# Patient Record
Sex: MALE | Race: WHITE | NOT HISPANIC OR LATINO | Employment: OTHER | URBAN - METROPOLITAN AREA
[De-identification: names, ages, dates, MRNs, and addresses within clinical notes are randomized per-mention and may not be internally consistent; named-entity substitution may affect disease eponyms.]

---

## 2017-01-05 ENCOUNTER — HOSPITAL ENCOUNTER (EMERGENCY)
Facility: MEDICAL CENTER | Age: 77
End: 2017-01-05
Attending: EMERGENCY MEDICINE
Payer: COMMERCIAL

## 2017-01-05 ENCOUNTER — APPOINTMENT (OUTPATIENT)
Dept: RADIOLOGY | Facility: MEDICAL CENTER | Age: 77
End: 2017-01-05
Attending: EMERGENCY MEDICINE
Payer: COMMERCIAL

## 2017-01-05 VITALS
SYSTOLIC BLOOD PRESSURE: 153 MMHG | DIASTOLIC BLOOD PRESSURE: 76 MMHG | OXYGEN SATURATION: 92 % | BODY MASS INDEX: 27.77 KG/M2 | HEIGHT: 72 IN | WEIGHT: 205 LBS | RESPIRATION RATE: 16 BRPM | HEART RATE: 73 BPM | TEMPERATURE: 96.8 F

## 2017-01-05 DIAGNOSIS — F10.10 ALCOHOL ABUSE: ICD-10-CM

## 2017-01-05 DIAGNOSIS — S09.90XA CHI (CLOSED HEAD INJURY), INITIAL ENCOUNTER: ICD-10-CM

## 2017-01-05 PROCEDURE — 72125 CT NECK SPINE W/O DYE: CPT

## 2017-01-05 PROCEDURE — 99284 EMERGENCY DEPT VISIT MOD MDM: CPT

## 2017-01-05 PROCEDURE — 96374 THER/PROPH/DIAG INJ IV PUSH: CPT

## 2017-01-05 PROCEDURE — 70450 CT HEAD/BRAIN W/O DYE: CPT

## 2017-01-05 PROCEDURE — 700111 HCHG RX REV CODE 636 W/ 250 OVERRIDE (IP): Performed by: EMERGENCY MEDICINE

## 2017-01-05 RX ORDER — ONDANSETRON 4 MG/1
4 TABLET, ORALLY DISINTEGRATING ORAL EVERY 8 HOURS PRN
Qty: 10 TAB | Refills: 1 | Status: SHIPPED | OUTPATIENT
Start: 2017-01-05 | End: 2023-01-01

## 2017-01-05 RX ORDER — ONDANSETRON 2 MG/ML
4 INJECTION INTRAMUSCULAR; INTRAVENOUS ONCE
Status: COMPLETED | OUTPATIENT
Start: 2017-01-05 | End: 2017-01-05

## 2017-01-05 RX ADMIN — ONDANSETRON 4 MG: 2 INJECTION, SOLUTION INTRAMUSCULAR; INTRAVENOUS at 18:38

## 2017-01-05 ASSESSMENT — PAIN SCALES - GENERAL: PAINLEVEL_OUTOF10: 0

## 2017-01-05 NOTE — ED AVS SNAPSHOT
HuoBi Access Code: [email or web address]  Expires: 2/4/2017 10:00 PM    Your email address is not on file at [facility].  Email Addresses are required for you to sign up for HuoBi, please contact [phone] to verify your personal information and to provide your email address prior to attempting to register for HuoBi.    Taran [address] [address]  [place] [address]    HuoBi  A secure, online tool to manage your health information     [facility]’s HuoBi® is a secure, online tool that connects you to your personalized health information from the privacy of your home -- day or night - making it very easy for you to manage your healthcare. Once the activation process is completed, you can even access your medical information using the HuoBi jonnathan, which is available for free in the Apple Jonnathan store or Google Play store.     To learn more about HuoBi, visit [email or web address]    There are two levels of access available (as shown below):   My Chart Features  [facility] Primary Care Doctor [facility]  Specialists [facility]  Urgent  Care Non-[facility] Primary Care Doctor   Email your healthcare team securely and privately 24/7 X X X    Manage appointments: schedule your next appointment; view details of past/upcoming appointments X      Request prescription refills. X      View recent personal medical records, including lab and immunizations X X X X   View health record, including health history, allergies, medications X X X X   Read reports about your outpatient visits, procedures, consult and ER notes X X X X   See your discharge summary, which is a recap of your hospital and/or ER visit that includes your diagnosis, lab results, and care plan X X  X     How to register for GoTunest:  Once your e-mail address has been verified, follow the following steps to sign up for HuoBi.     1. Go to  [email or web address]  2. Click on the Sign Up Now box, which takes you to the New Member Sign Up page. You will need  to provide the following information:  a. Enter your Ad.IQ Access Code exactly as it appears at the top of this page. (You will not need to use this code after you’ve completed the sign-up process. If you do not sign up before the expiration date, you must request a new code.)   b. Enter your date of birth.   c. Enter your home email address.   d. Click Submit, and follow the next screen’s instructions.  3. Create a Ad.IQ ID. This will be your Ad.IQ login ID and cannot be changed, so think of one that is secure and easy to remember.  4. Create a Ad.IQ password. You can change your password at any time.  5. Enter your Password Reset Question and Answer. This can be used at a later time if you forget your password.   6. Enter your e-mail address. This allows you to receive e-mail notifications when new information is available in Ad.IQ.  7. Click Sign Up. You can now view your health information.    For assistance activating your Ad.IQ account, call [phone]

## 2017-01-05 NOTE — ED AVS SNAPSHOT
1/5/2017          Taran [address] [address]  [facility] [address]    Dear Taran:    [facility] wants to ensure your discharge home is safe and you or your loved ones have had all your questions answered regarding your care after you leave the hospital.    You may receive a telephone call within two days of your discharge.  This call is to make certain you understand your discharge instructions as well as ensure we provided you with the best care possible during your stay with us.     The call will only last approximately 3-5 minutes and will be done by a nurse.    Once again, we want to ensure your discharge home is safe and that you have a clear understanding of any next steps in your care.  If you have any questions or concerns, please do not hesitate to contact us, we are here for you.  Thank you for choosing [facility] for your healthcare needs.    Sincerely,    Josafat Mao    [facility]

## 2017-01-05 NOTE — ED AVS SNAPSHOT
After Visit Summary                                                                                                                Taran Ornelas   MRN: [id number]    Department:  [facility], Emergency Dept   Date of Visit:  1/5/2017            [facility], Emergency Dept    [address] [facility] [address]    Phone:  [phone]      You were seen by     Price Saavedra D.O.      Your Diagnosis Was     CHI (closed head injury), initial encounter     S09.90XA       These are the medications you received during your hospitalization from 01/05/2017 1758 to 01/05/2017 2200     Date/Time Order Dose Route Action    01/05/2017 1838 ondansetron (ZOFRAN) syringe/vial injection 4 mg 4 mg Intravenous Given      Follow-up Information     1. Schedule an appointment as soon as possible for a visit with Alfie Taylor M.D..    Specialty:  Family Medicine    Contact information    [address]  [address]  [facility] [address]  [phone]        Medication Information     Review all of your home medications and newly ordered medications with your primary doctor and/or pharmacist as soon as possible. Follow medication instructions as directed by your doctor and/or pharmacist.     Please keep your complete medication list with you and share with your physician. Update the information when medications are discontinued, doses are changed, or new medications (including over-the-counter products) are added; and carry medication information at all times in the event of emergency situations.               Medication List      START taking these medications        Instructions    ondansetron 4 MG Tbdp   Commonly known as:  ZOFRAN ODT    Take 1 Tab by mouth every 8 hours as needed.   Dose:  4 mg         ASK your doctor about these medications        Instructions    allopurinol 300 MG Tabs   Commonly known as:  ZYLOPRIM    Take 300 mg by mouth every day.   Dose:  300 mg       aspirin 81 MG tablet    Take  81 mg by mouth every day.   Dose:  81 mg       benazepril 10 MG Tabs   Commonly known as:  LOTENSIN    Take 10 mg by mouth 2 Times a Day.   Dose:  10 mg       gabapentin 300 MG Caps   Commonly known as:  NEURONTIN    Take 300 mg by mouth 3 times a day.   Dose:  300 mg       metoprolol 25 MG Tabs   Commonly known as:  LOPRESSOR    Take 1 Tab by mouth 2 times a day.   Dose:  25 mg       VITAMIN B 12 PO    Take  by mouth.       vitamin D 1000 UNIT Tabs   Commonly known as:  cholecalciferol    Take 1,000 Units by mouth every day.   Dose:  1000 Units               Procedures and tests performed during your visit     CT-CSPINE WITHOUT PLUS RECONS    CT-HEAD W/O        Discharge Instructions       Head Injury, Adult  You have a head injury. Headaches and throwing up (vomiting) are common after a head injury. It should be easy to wake up from sleeping. Sometimes you must stay in the hospital. Most problems happen within the first 24 hours. Side effects may occur up to 7-10 days after the injury.   WHAT ARE THE TYPES OF HEAD INJURIES?  Head injuries can be as minor as a bump. Some head injuries can be more severe. More severe head injuries include:  · A jarring injury to the brain (concussion).  · A bruise of the brain (contusion). This mean there is bleeding in the brain that can cause swelling.  · A cracked skull (skull fracture).  · Bleeding in the brain that collects, clots, and forms a bump (hematoma).  WHEN SHOULD I GET HELP RIGHT AWAY?   · You are confused or sleepy.  · You cannot be woken up.  · You feel sick to your stomach (nauseous) or keep throwing up (vomiting).  · Your dizziness or unsteadiness is getting worse.  · You have very bad, lasting headaches that are not helped by medicine. Take medicines only as told by your doctor.  · You cannot use your arms or legs like normal.  · You cannot walk.  · You notice changes in the black spots in the center of the colored part of your eye (pupil).  · You have clear or  bloody fluid coming from your nose or ears.  · You have trouble seeing.  During the next 24 hours after the injury, you must stay with someone who can watch you. This person should get help right away (call 911 in the [place] if you start to shake and are not able to control it (have seizures), you pass out, or you are unable to wake up.  HOW CAN I PREVENT A HEAD INJURY IN THE FUTURE?  · Wear seat belts.  · Wear a helmet while bike riding and playing sports like football.  · Stay away from dangerous activities around the house.  WHEN CAN I RETURN TO NORMAL ACTIVITIES AND ATHLETICS?  See your doctor before doing these activities. You should not do normal activities or play contact sports until 1 week after the following symptoms have stopped:  · Headache that does not go away.  · Dizziness.  · Poor attention.  · Confusion.  · Memory problems.  · Sickness to your stomach or throwing up.  · Tiredness.  · Fussiness.  · Bothered by bright lights or loud noises.  · Anxiousness or depression.  · Restless sleep.  MAKE SURE YOU:   · Understand these instructions.  · Will watch your condition.  · Will get help right away if you are not doing well or get worse.     This information is not intended to replace advice given to you by your health care provider. Make sure you discuss any questions you have with your health care provider.     Document Released: 11/30/2009 Document Revised: 01/08/2016 Document Reviewed: 08/25/2014  [employer] Interactive Patient Education ©2016 [employer]            Patient Information     Patient Information    Following emergency treatment: all patient requiring follow-up care must return either to a private physician or a clinic if your condition worsens before you are able to obtain further medical attention, please return to the emergency room.     Billing Information    At [facility], we work to make the billing process streamlined for our patients.  Our Representatives are here to answer any  questions you may have regarding your hospital bill.  If you have insurance coverage and have supplied your insurance information to us, we will submit a claim to your insurer on your behalf.  Should you have any questions regarding your bill, we can be reached online or by phone as follows:  Online: You are able pay your bills online or live chat with our representatives about any billing questions you may have. We are here to help Monday - Friday from 8:00am to 7:30pm and 9:00am - 12:00pm on Saturdays.  Please visit [email or web address]  for more information.   Phone:  [phone] or [phone]    Please note that your emergency physician, surgeon, pathologist, radiologist, anesthesiologist, and other specialists are not employed by [facility] and will therefore bill separately for their services.  Please contact them directly for any questions concerning their bills at the numbers below:     Emergency Physician Services:  [phone]  [facility]:  [phone]  [facility]:  [phone]  [facility]:  [phone]    1. Your final bill may vary from the amount quoted upon discharge if all procedures are not complete at that time, or if your doctor has additional procedures of which we are not aware. You will receive an additional bill if you return to the Emergency Department at [facility] for suture removal regardless of the facility of which the sutures were placed.     2. Please arrange for settlement of this account at the emergency registration.    3. All self-pay accounts are due in full at the time of treatment.  If you are unable to meet this obligation then payment is expected within 4-5 days.     4. If you have had radiology studies (CT, X-ray, Ultrasound, MRI), you have received a preliminary result during your emergency department visit. Please contact the radiology department [phone] to receive a copy of  your final result. Please discuss the Final result with your primary physician or with the follow up physician provided.     Crisis Hotline:  [place] Crisis Hotline:  1-003-KIBNHUV or 1-143.608.6137  [place] Crisis Hotline:    1-939.491.1450 or 257-871-4295         ED Discharge Follow Up Questions    1. In order to provide you with very good care, we would like to follow up with a phone call in the next few days.  May we have your permission to contact you?     YES /  NO    2. What is the best phone number to call you? (       )_____-__________    3. What is the best time to call you?      Morning  /  Afternoon  /  Evening                   Patient Signature:  ____________________________________________________________    Date:  ____________________________________________________________      Your appointments     Feb 23, 2017 10:00 AM   PREVIOUS PATIENT with Darryl Butts M.D.   [facility] (--)    [address], [address] [address]   [phone]

## 2017-01-06 NOTE — DISCHARGE INSTRUCTIONS
Head Injury, Adult  You have a head injury. Headaches and throwing up (vomiting) are common after a head injury. It should be easy to wake up from sleeping. Sometimes you must stay in the hospital. Most problems happen within the first 24 hours. Side effects may occur up to 7-10 days after the injury.   WHAT ARE THE TYPES OF HEAD INJURIES?  Head injuries can be as minor as a bump. Some head injuries can be more severe. More severe head injuries include:  · A jarring injury to the brain (concussion).  · A bruise of the brain (contusion). This mean there is bleeding in the brain that can cause swelling.  · A cracked skull (skull fracture).  · Bleeding in the brain that collects, clots, and forms a bump (hematoma).  WHEN SHOULD I GET HELP RIGHT AWAY?   · You are confused or sleepy.  · You cannot be woken up.  · You feel sick to your stomach (nauseous) or keep throwing up (vomiting).  · Your dizziness or unsteadiness is getting worse.  · You have very bad, lasting headaches that are not helped by medicine. Take medicines only as told by your doctor.  · You cannot use your arms or legs like normal.  · You cannot walk.  · You notice changes in the black spots in the center of the colored part of your eye (pupil).  · You have clear or bloody fluid coming from your nose or ears.  · You have trouble seeing.  During the next 24 hours after the injury, you must stay with someone who can watch you. This person should get help right away (call 911 in the [place] if you start to shake and are not able to control it (have seizures), you pass out, or you are unable to wake up.  HOW CAN I PREVENT A HEAD INJURY IN THE FUTURE?  · Wear seat belts.  · Wear a helmet while bike riding and playing sports like football.  · Stay away from dangerous activities around the house.  WHEN CAN I RETURN TO NORMAL ACTIVITIES AND ATHLETICS?  See your doctor before doing these activities. You should not do normal activities or play contact sports until 1  week after the following symptoms have stopped:  · Headache that does not go away.  · Dizziness.  · Poor attention.  · Confusion.  · Memory problems.  · Sickness to your stomach or throwing up.  · Tiredness.  · Fussiness.  · Bothered by bright lights or loud noises.  · Anxiousness or depression.  · Restless sleep.  MAKE SURE YOU:   · Understand these instructions.  · Will watch your condition.  · Will get help right away if you are not doing well or get worse.     This information is not intended to replace advice given to you by your health care provider. Make sure you discuss any questions you have with your health care provider.     Document Released: 11/30/2009 Document Revised: 01/08/2016 Document Reviewed: 08/25/2014  [employer] Interactive Patient Education ©2016 [employer]

## 2017-01-06 NOTE — ED NOTES
Pt resting comfortably on gurney. Back in room from imaging. No signs of distress. Respirations are even and unlabored. Call light within reach.

## 2017-01-06 NOTE — ED NOTES
.  Chief Complaint   Patient presents with   • GLF     Patient found down by family outside, possible slip and fall on ice, denies acute pain, abrasion to posterior head, reports ETOH use today, AAOx4, PERRL, patient has no recollection of events     ./76 mmHg  Pulse 58  Temp(Src) 36 °C (96.8 °F)  Resp 16  Ht 1.829 m (6')  Wt 92.987 kg (205 lb)  BMI 27.80 kg/m2    BIB EMS following reported slip and fall on ice, awake, alert, c-collar in place, chart up for ERP

## 2017-01-06 NOTE — ED NOTES
Family at bedside. All results back, chart up for re-eval. Pt with no change from previous assessments.

## 2017-01-06 NOTE — ED NOTES
Pt given discharge instructions, follow up care, and prescriptions. Pt verbalized understanding. RN to answer and questions pt and family had. VSS. Pt ambulated out to front lobby.

## 2017-01-07 NOTE — ED PROVIDER NOTES
ED Provider Note    CHIEF COMPLAINT  Chief Complaint   Patient presents with   • GLF     Patient found down by family outside, possible slip and fall on ice, denies acute pain, abrasion to posterior head, reports ETOH use today, AAOx4, PERRL, patient has no recollection of events       HPI  Taran Ornelas is a 76 y.o. male who presents to the emergency room today after slipping fall with loss of consciousness and head injury. Patient states that he slipped on some ice does not recall events positive loss of consciousness brought in by ambulance. Has abrasion to the back of his head. Had episode of vomiting here in the emergency room times one. Does admit to alcohol use today. Otherwise no complaints of chest or arm pain or back pain has slight neck pain is in c-collar and protective devices. This pain to the back of his head described a sharp stabbing. Worse with movement or palpation injury occurred just prior to being seen in the emergency room. Had some pain to his left hip which she states is chronic in nature and is ambulatory here in the emergency room,    REVIEW OF SYSTEMS  See HPI for further details. All other systems are negative.     PAST MEDICAL HISTORY  Past Medical History   Diagnosis Date   • Atrial fibrillation (HCC) 4/19/2012   • Benign essential hypertension 4/19/2012   • Chronic anticoagulation 4/19/2012   • PVC's (premature ventricular contractions) 4/19/2012   • GOUT 4/19/2012   • CAD (coronary artery disease), native coronary artery 4/19/2012       FAMILY HISTORY  [unfilled]    SOCIAL HISTORY  Social History     Social History   • Marital Status:      Spouse Name: N/A   • Number of Children: N/A   • Years of Education: N/A     Social History Main Topics   • Smoking status: Former Smoker -- 20 years     Quit date: 11/08/1978   • Smokeless tobacco: Never Used   • Alcohol Use: Yes      Comment: occasional   • Drug Use: No   • Sexual Activity: Not Asked     Other Topics Concern   • None      Social History Narrative       SURGICAL HISTORY  Past Surgical History   Procedure Laterality Date   • Other cardiac surgery       aortic coarctation repair age 12       CURRENT MEDICATIONS  Home Medications     Reviewed by Paulina Oneill R.N. (Registered Nurse) on 01/05/17 at 1820  Med List Status: Complete    Medication Last Dose Status    allopurinol (ZYLOPRIM) 300 MG TABS 1/5/2017 Active    aspirin 81 MG tablet 1/5/2017 Active    benazepril (LOTENSIN) 10 MG TABS 1/5/2017 Active    Cyanocobalamin (VITAMIN B 12 PO) 1/5/2017 Active    gabapentin (NEURONTIN) 300 MG CAPS 1/5/2017 Active    metoprolol (LOPRESSOR) 25 MG TABS 1/5/2017 Active    vitamin D (CHOLECALCIFEROL) 1000 UNIT TABS 1/5/2017 Active                ALLERGIES  No Known Allergies    PHYSICAL EXAM  VITAL SIGNS: /76 mmHg  Pulse 73  Temp(Src) 36 °C (96.8 °F)  Resp 16  Ht 1.829 m (6')  Wt 92.987 kg (205 lb)  BMI 27.80 kg/m2  SpO2 92% Room air O2: 95    Constitutional: Well developed, Well nourished, No acute distress, Non-toxic appearance.   HENT: Normocephalic, abrasion to the posterior occipital area surrounding about 5 cm some edema to the area no deformity otherwise noted, Bilateral external ears normal, Oropharynx moist, No oral exudates, Nose normal.   Eyes: PERRLA, EOMI, Conjunctiva normal, No discharge.   Neck: Normal range of motion, minimal paraspinal tenderness, Supple, No stridor. No bony gross deformities on examination   Lymphatic: No lymphadenopathy noted.   Cardiovascular: Normal heart rate, Normal rhythm, No murmurs, No rubs, No gallops.   Thorax & Lungs: Normal breath sounds, No respiratory distress, No wheezing, No chest tenderness.   Abdomen: Bowel sounds normal, Soft, No tenderness, No masses, No pulsatile masses.   Skin: Warm, Dry, No erythema, No rash.   Back: No tenderness, No CVA tenderness.   Extremities: Intact distal pulses, No edema, chronic left hip tenderness, No cyanosis, No clubbing.   Musculoskeletal:  Good range of motion in all major joints. No tenderness to palpation or major deformities noted.   Neurologic: Alert & oriented x 3, Normal motor function, Normal sensory function, No focal deficits noted.   Psychiatric: Affect normal, Judgment normal, Mood normal.         RADIOLOGY/PROCEDURES  CT-CSPINE WITHOUT PLUS RECONS   Final Result      No acute fracture or traumatic malalignment.      CT-HEAD W/O   Final Result      1.  No evidence of acute intracranial process.      2.  Cerebral atrophy as well as periventricular chronic small vessel ischemic change.            COURSE & MEDICAL DECISION MAKING  Pertinent Labs & Imaging studies reviewed. (See chart for details)  Tetanus was up-to-date per patient. Wound was cleansed and dressed. CT scans were reviewed with patient and family. Patient was placed on head injury precautions. Discussed discontinuing alcohol use. He had no further episodes of vomiting is able hold down fluids and was able to walk without difficulty. He was placed on Zofran as needed for home and head injury precautions family verbalize as well as patient verbalized understanding of instructions.    FINAL IMPRESSION  1. Acute closed head injury secondary to fall  2. Abrasion scalp  3. Alcohol abuse         Electronically signed by: Price Saavedra, 1/7/2017 1:06 PM

## 2017-03-31 ENCOUNTER — HOSPITAL ENCOUNTER (OUTPATIENT)
Dept: HOSPITAL 8 - CFH | Age: 77
Discharge: HOME | End: 2017-03-31
Attending: INTERNAL MEDICINE
Payer: MEDICARE

## 2017-03-31 DIAGNOSIS — I25.10: ICD-10-CM

## 2017-03-31 DIAGNOSIS — I37.1: ICD-10-CM

## 2017-03-31 DIAGNOSIS — D73.4: ICD-10-CM

## 2017-03-31 DIAGNOSIS — M47.894: ICD-10-CM

## 2017-03-31 DIAGNOSIS — J84.10: Primary | ICD-10-CM

## 2017-03-31 DIAGNOSIS — K80.80: ICD-10-CM

## 2017-03-31 DIAGNOSIS — I08.3: ICD-10-CM

## 2017-03-31 PROCEDURE — 71275 CT ANGIOGRAPHY CHEST: CPT

## 2017-03-31 PROCEDURE — 93306 TTE W/DOPPLER COMPLETE: CPT

## 2017-12-26 ENCOUNTER — HOSPITAL ENCOUNTER (OUTPATIENT)
Dept: HOSPITAL 8 - CFH | Age: 77
Discharge: HOME | End: 2017-12-26
Attending: INTERNAL MEDICINE
Payer: MEDICARE

## 2017-12-26 DIAGNOSIS — I11.9: ICD-10-CM

## 2017-12-26 DIAGNOSIS — Z01.810: Primary | ICD-10-CM

## 2017-12-26 DIAGNOSIS — I25.89: ICD-10-CM

## 2017-12-26 PROCEDURE — 93017 CV STRESS TEST TRACING ONLY: CPT

## 2017-12-26 PROCEDURE — 78452 HT MUSCLE IMAGE SPECT MULT: CPT

## 2017-12-26 PROCEDURE — A9502 TC99M TETROFOSMIN: HCPCS

## 2018-01-17 ENCOUNTER — HOSPITAL ENCOUNTER (OUTPATIENT)
Dept: HOSPITAL 8 - STAR | Age: 78
Discharge: HOME | End: 2018-01-17
Attending: ORTHOPAEDIC SURGERY
Payer: MEDICARE

## 2018-01-17 DIAGNOSIS — M16.12: ICD-10-CM

## 2018-01-17 DIAGNOSIS — R94.31: ICD-10-CM

## 2018-01-17 DIAGNOSIS — Z01.818: Primary | ICD-10-CM

## 2018-01-17 LAB
ALBUMIN SERPL-MCNC: 3.8 G/DL (ref 3.4–5)
ALP SERPL-CCNC: 67 U/L (ref 45–117)
ALT SERPL-CCNC: 69 U/L (ref 12–78)
ANION GAP SERPL CALC-SCNC: 9 MMOL/L (ref 5–15)
BASOPHILS # BLD AUTO: 0.04 X10^3/UL (ref 0–0.1)
BASOPHILS NFR BLD AUTO: 1 % (ref 0–1)
BILIRUB SERPL-MCNC: 1 MG/DL (ref 0.2–1)
CALCIUM SERPL-MCNC: 10 MG/DL (ref 8.5–10.1)
CHLORIDE SERPL-SCNC: 105 MMOL/L (ref 98–107)
CREAT SERPL-MCNC: 0.92 MG/DL (ref 0.7–1.3)
EOSINOPHIL # BLD AUTO: 0.36 X10^3/UL (ref 0–0.4)
EOSINOPHIL NFR BLD AUTO: 5 % (ref 1–7)
ERYTHROCYTE [DISTWIDTH] IN BLOOD BY AUTOMATED COUNT: 13.4 % (ref 9.4–14.8)
LYMPHOCYTES # BLD AUTO: 2.5 X10^3/UL (ref 1–3.4)
LYMPHOCYTES NFR BLD AUTO: 34 % (ref 22–44)
MCH RBC QN AUTO: 35.1 PG (ref 27.5–34.5)
MCHC RBC AUTO-ENTMCNC: 34.6 G/DL (ref 33.2–36.2)
MCV RBC AUTO: 101.4 FL (ref 81–97)
MD: NO
MONOCYTES # BLD AUTO: 0.67 X10^3/UL (ref 0.2–0.8)
MONOCYTES NFR BLD AUTO: 9 % (ref 2–9)
NEUTROPHILS # BLD AUTO: 3.73 X10^3/UL (ref 1.8–6.8)
NEUTROPHILS NFR BLD AUTO: 51 % (ref 42–75)
PLATELET # BLD AUTO: 140 X10^3/UL (ref 130–400)
PMV BLD AUTO: 6.6 FL (ref 7.4–10.4)
PROT SERPL-MCNC: 7.4 G/DL (ref 6.4–8.2)
RBC # BLD AUTO: 5.06 X10^6/UL (ref 4.38–5.82)

## 2018-01-17 PROCEDURE — 80053 COMPREHEN METABOLIC PANEL: CPT

## 2018-01-17 PROCEDURE — 85025 COMPLETE CBC W/AUTO DIFF WBC: CPT

## 2018-01-17 PROCEDURE — 36415 COLL VENOUS BLD VENIPUNCTURE: CPT

## 2018-01-17 PROCEDURE — 93005 ELECTROCARDIOGRAM TRACING: CPT

## 2018-07-23 ENCOUNTER — HOSPITAL ENCOUNTER (INPATIENT)
Dept: HOSPITAL 8 - ED | Age: 78
LOS: 9 days | Discharge: NURSING, REHAB OR LONG-TERM CARE | DRG: 233 | End: 2018-08-01
Attending: FAMILY MEDICINE | Admitting: FAMILY MEDICINE
Payer: MEDICARE

## 2018-07-23 VITALS — WEIGHT: 203.93 LBS | HEIGHT: 72 IN | BODY MASS INDEX: 27.62 KG/M2

## 2018-07-23 VITALS — DIASTOLIC BLOOD PRESSURE: 81 MMHG | SYSTOLIC BLOOD PRESSURE: 149 MMHG

## 2018-07-23 VITALS — DIASTOLIC BLOOD PRESSURE: 92 MMHG | SYSTOLIC BLOOD PRESSURE: 167 MMHG

## 2018-07-23 VITALS — DIASTOLIC BLOOD PRESSURE: 73 MMHG | SYSTOLIC BLOOD PRESSURE: 148 MMHG

## 2018-07-23 VITALS — SYSTOLIC BLOOD PRESSURE: 108 MMHG | DIASTOLIC BLOOD PRESSURE: 88 MMHG

## 2018-07-23 VITALS — DIASTOLIC BLOOD PRESSURE: 71 MMHG | SYSTOLIC BLOOD PRESSURE: 165 MMHG

## 2018-07-23 DIAGNOSIS — Z96.642: ICD-10-CM

## 2018-07-23 DIAGNOSIS — Z83.3: ICD-10-CM

## 2018-07-23 DIAGNOSIS — I21.4: Primary | ICD-10-CM

## 2018-07-23 DIAGNOSIS — F10.239: ICD-10-CM

## 2018-07-23 DIAGNOSIS — I11.0: ICD-10-CM

## 2018-07-23 DIAGNOSIS — F17.210: ICD-10-CM

## 2018-07-23 DIAGNOSIS — I44.7: ICD-10-CM

## 2018-07-23 DIAGNOSIS — Z80.8: ICD-10-CM

## 2018-07-23 DIAGNOSIS — Z96.652: ICD-10-CM

## 2018-07-23 DIAGNOSIS — I25.5: ICD-10-CM

## 2018-07-23 DIAGNOSIS — E78.00: ICD-10-CM

## 2018-07-23 DIAGNOSIS — I25.119: ICD-10-CM

## 2018-07-23 DIAGNOSIS — J96.00: ICD-10-CM

## 2018-07-23 DIAGNOSIS — N40.0: ICD-10-CM

## 2018-07-23 DIAGNOSIS — D53.9: ICD-10-CM

## 2018-07-23 DIAGNOSIS — E78.5: ICD-10-CM

## 2018-07-23 DIAGNOSIS — Z90.49: ICD-10-CM

## 2018-07-23 DIAGNOSIS — Z79.82: ICD-10-CM

## 2018-07-23 DIAGNOSIS — Z79.899: ICD-10-CM

## 2018-07-23 DIAGNOSIS — M10.9: ICD-10-CM

## 2018-07-23 DIAGNOSIS — I50.21: ICD-10-CM

## 2018-07-23 DIAGNOSIS — G89.4: ICD-10-CM

## 2018-07-23 DIAGNOSIS — Z82.49: ICD-10-CM

## 2018-07-23 DIAGNOSIS — I25.84: ICD-10-CM

## 2018-07-23 LAB
ALBUMIN SERPL-MCNC: 3.4 G/DL (ref 3.4–5)
ALBUMIN SERPL-MCNC: 3.4 G/DL (ref 3.4–5)
ALP SERPL-CCNC: 63 U/L (ref 45–117)
ALP SERPL-CCNC: 68 U/L (ref 45–117)
ALT SERPL-CCNC: 29 U/L (ref 12–78)
ALT SERPL-CCNC: 32 U/L (ref 12–78)
ANION GAP SERPL CALC-SCNC: 7 MMOL/L (ref 5–15)
ANION GAP SERPL CALC-SCNC: 9 MMOL/L (ref 5–15)
APTT BLD: 59 SECONDS (ref 25–31)
BASOPHILS # BLD AUTO: 0.02 X10^3/UL (ref 0–0.1)
BASOPHILS # BLD AUTO: 0.05 X10^3/UL (ref 0–0.1)
BASOPHILS NFR BLD AUTO: 0 % (ref 0–1)
BASOPHILS NFR BLD AUTO: 1 % (ref 0–1)
BILIRUB SERPL-MCNC: 0.5 MG/DL (ref 0.2–1)
BILIRUB SERPL-MCNC: 1.1 MG/DL (ref 0.2–1)
CALCIUM SERPL-MCNC: 8.9 MG/DL (ref 8.5–10.1)
CALCIUM SERPL-MCNC: 9.2 MG/DL (ref 8.5–10.1)
CHLORIDE SERPL-SCNC: 106 MMOL/L (ref 98–107)
CHLORIDE SERPL-SCNC: 109 MMOL/L (ref 98–107)
CREAT SERPL-MCNC: 1 MG/DL (ref 0.7–1.3)
CREAT SERPL-MCNC: 1.21 MG/DL (ref 0.7–1.3)
EOSINOPHIL # BLD AUTO: 0.21 X10^3/UL (ref 0–0.4)
EOSINOPHIL # BLD AUTO: 0.3 X10^3/UL (ref 0–0.4)
EOSINOPHIL NFR BLD AUTO: 3 % (ref 1–7)
EOSINOPHIL NFR BLD AUTO: 6 % (ref 1–7)
ERYTHROCYTE [DISTWIDTH] IN BLOOD BY AUTOMATED COUNT: 14.4 % (ref 9.4–14.8)
ERYTHROCYTE [DISTWIDTH] IN BLOOD BY AUTOMATED COUNT: 14.6 % (ref 9.4–14.8)
EST. AVERAGE GLUCOSE BLD GHB EST-MCNC: 91 MG/DL (ref 0–126)
HBA1C MFR BLD: 4.8 % (ref 4.2–6.3)
INR PPP: 1.1 (ref 0.93–1.1)
LYMPHOCYTES # BLD AUTO: 1.5 X10^3/UL (ref 1–3.4)
LYMPHOCYTES # BLD AUTO: 1.97 X10^3/UL (ref 1–3.4)
LYMPHOCYTES NFR BLD AUTO: 27 % (ref 22–44)
LYMPHOCYTES NFR BLD AUTO: 30 % (ref 22–44)
MCH RBC QN AUTO: 36.6 PG (ref 27.5–34.5)
MCH RBC QN AUTO: 36.6 PG (ref 27.5–34.5)
MCHC RBC AUTO-ENTMCNC: 34.9 G/DL (ref 33.2–36.2)
MCHC RBC AUTO-ENTMCNC: 34.9 G/DL (ref 33.2–36.2)
MCV RBC AUTO: 104.8 FL (ref 81–97)
MCV RBC AUTO: 104.8 FL (ref 81–97)
MD: NO
MD: NO
MICROSCOPIC: (no result)
MONOCYTES # BLD AUTO: 0.42 X10^3/UL (ref 0.2–0.8)
MONOCYTES # BLD AUTO: 0.51 X10^3/UL (ref 0.2–0.8)
MONOCYTES NFR BLD AUTO: 7 % (ref 2–9)
MONOCYTES NFR BLD AUTO: 8 % (ref 2–9)
NEUTROPHILS # BLD AUTO: 2.74 X10^3/UL (ref 1.8–6.8)
NEUTROPHILS # BLD AUTO: 4.71 X10^3/UL (ref 1.8–6.8)
NEUTROPHILS NFR BLD AUTO: 55 % (ref 42–75)
NEUTROPHILS NFR BLD AUTO: 63 % (ref 42–75)
PLATELET # BLD AUTO: 106 X10^3/UL (ref 130–400)
PLATELET # BLD AUTO: 113 X10^3/UL (ref 130–400)
PMV BLD AUTO: 7.2 FL (ref 7.4–10.4)
PMV BLD AUTO: 7.3 FL (ref 7.4–10.4)
PROT SERPL-MCNC: 6.3 G/DL (ref 6.4–8.2)
PROT SERPL-MCNC: 6.3 G/DL (ref 6.4–8.2)
PROTHROMBIN TIME: 11.4 SECONDS (ref 9.6–11.5)
RBC # BLD AUTO: 4.45 X10^6/UL (ref 4.38–5.82)
RBC # BLD AUTO: 4.68 X10^6/UL (ref 4.38–5.82)
TROPONIN I SERPL-MCNC: 0.37 NG/ML (ref 0–0.04)
TROPONIN I SERPL-MCNC: 1.37 NG/ML (ref 0–0.04)
TROPONIN I SERPL-MCNC: 3.24 NG/ML (ref 0–0.04)
TROPONIN I SERPL-MCNC: 3.3 NG/ML (ref 0–0.04)

## 2018-07-23 PROCEDURE — 82962 GLUCOSE BLOOD TEST: CPT

## 2018-07-23 PROCEDURE — 36415 COLL VENOUS BLD VENIPUNCTURE: CPT

## 2018-07-23 PROCEDURE — P9035 PLATELET PHERES LEUKOREDUCED: HCPCS

## 2018-07-23 PROCEDURE — 94003 VENT MGMT INPAT SUBQ DAY: CPT

## 2018-07-23 PROCEDURE — 80053 COMPREHEN METABOLIC PANEL: CPT

## 2018-07-23 PROCEDURE — P9045 ALBUMIN (HUMAN), 5%, 250 ML: HCPCS

## 2018-07-23 PROCEDURE — 86900 BLOOD TYPING SEROLOGIC ABO: CPT

## 2018-07-23 PROCEDURE — 80048 BASIC METABOLIC PNL TOTAL CA: CPT

## 2018-07-23 PROCEDURE — 83880 ASSAY OF NATRIURETIC PEPTIDE: CPT

## 2018-07-23 PROCEDURE — 84484 ASSAY OF TROPONIN QUANT: CPT

## 2018-07-23 PROCEDURE — 83036 HEMOGLOBIN GLYCOSYLATED A1C: CPT

## 2018-07-23 PROCEDURE — 82810 BLOOD GASES O2 SAT ONLY: CPT

## 2018-07-23 PROCEDURE — 85018 HEMOGLOBIN: CPT

## 2018-07-23 PROCEDURE — 85730 THROMBOPLASTIN TIME PARTIAL: CPT

## 2018-07-23 PROCEDURE — 85610 PROTHROMBIN TIME: CPT

## 2018-07-23 PROCEDURE — 36600 WITHDRAWAL OF ARTERIAL BLOOD: CPT

## 2018-07-23 PROCEDURE — 93306 TTE W/DOPPLER COMPLETE: CPT

## 2018-07-23 PROCEDURE — 80061 LIPID PANEL: CPT

## 2018-07-23 PROCEDURE — 85049 AUTOMATED PLATELET COUNT: CPT

## 2018-07-23 PROCEDURE — B2111ZZ FLUOROSCOPY OF MULTIPLE CORONARY ARTERIES USING LOW OSMOLAR CONTRAST: ICD-10-PCS | Performed by: INTERNAL MEDICINE

## 2018-07-23 PROCEDURE — 93880 EXTRACRANIAL BILAT STUDY: CPT

## 2018-07-23 PROCEDURE — 82800 BLOOD PH: CPT

## 2018-07-23 PROCEDURE — C1894 INTRO/SHEATH, NON-LASER: HCPCS

## 2018-07-23 PROCEDURE — 93458 L HRT ARTERY/VENTRICLE ANGIO: CPT

## 2018-07-23 PROCEDURE — 93005 ELECTROCARDIOGRAM TRACING: CPT

## 2018-07-23 PROCEDURE — C1751 CATH, INF, PER/CENT/MIDLINE: HCPCS

## 2018-07-23 PROCEDURE — 82746 ASSAY OF FOLIC ACID SERUM: CPT

## 2018-07-23 PROCEDURE — 99156 MOD SED OTH PHYS/QHP 5/>YRS: CPT

## 2018-07-23 PROCEDURE — 93312 ECHO TRANSESOPHAGEAL: CPT

## 2018-07-23 PROCEDURE — 93970 EXTREMITY STUDY: CPT

## 2018-07-23 PROCEDURE — 85025 COMPLETE CBC W/AUTO DIFF WBC: CPT

## 2018-07-23 PROCEDURE — 93325 DOPPLER ECHO COLOR FLOW MAPG: CPT

## 2018-07-23 PROCEDURE — 82803 BLOOD GASES ANY COMBINATION: CPT

## 2018-07-23 PROCEDURE — 85520 HEPARIN ASSAY: CPT

## 2018-07-23 PROCEDURE — 87081 CULTURE SCREEN ONLY: CPT

## 2018-07-23 PROCEDURE — 82040 ASSAY OF SERUM ALBUMIN: CPT

## 2018-07-23 PROCEDURE — 85014 HEMATOCRIT: CPT

## 2018-07-23 PROCEDURE — 81001 URINALYSIS AUTO W/SCOPE: CPT

## 2018-07-23 PROCEDURE — 82947 ASSAY GLUCOSE BLOOD QUANT: CPT

## 2018-07-23 PROCEDURE — 99285 EMERGENCY DEPT VISIT HI MDM: CPT

## 2018-07-23 PROCEDURE — 71045 X-RAY EXAM CHEST 1 VIEW: CPT

## 2018-07-23 PROCEDURE — 36430 TRANSFUSION BLD/BLD COMPNT: CPT

## 2018-07-23 PROCEDURE — 84132 ASSAY OF SERUM POTASSIUM: CPT

## 2018-07-23 PROCEDURE — C1769 GUIDE WIRE: HCPCS

## 2018-07-23 PROCEDURE — 83735 ASSAY OF MAGNESIUM: CPT

## 2018-07-23 PROCEDURE — 86923 COMPATIBILITY TEST ELECTRIC: CPT

## 2018-07-23 PROCEDURE — 86850 RBC ANTIBODY SCREEN: CPT

## 2018-07-23 PROCEDURE — 93321 DOPPLER ECHO F-UP/LMTD STD: CPT

## 2018-07-23 PROCEDURE — 32555 ASPIRATE PLEURA W/ IMAGING: CPT

## 2018-07-23 PROCEDURE — 84295 ASSAY OF SERUM SODIUM: CPT

## 2018-07-23 PROCEDURE — B3101ZZ FLUOROSCOPY OF THORACIC AORTA USING LOW OSMOLAR CONTRAST: ICD-10-PCS | Performed by: INTERNAL MEDICINE

## 2018-07-23 PROCEDURE — B2151ZZ FLUOROSCOPY OF LEFT HEART USING LOW OSMOLAR CONTRAST: ICD-10-PCS | Performed by: INTERNAL MEDICINE

## 2018-07-23 PROCEDURE — C1760 CLOSURE DEV, VASC: HCPCS

## 2018-07-23 PROCEDURE — 4A023N7 MEASUREMENT OF CARDIAC SAMPLING AND PRESSURE, LEFT HEART, PERCUTANEOUS APPROACH: ICD-10-PCS | Performed by: INTERNAL MEDICINE

## 2018-07-23 PROCEDURE — 94002 VENT MGMT INPAT INIT DAY: CPT

## 2018-07-23 PROCEDURE — 99157 MOD SED OTHER PHYS/QHP EA: CPT

## 2018-07-23 PROCEDURE — 85347 COAGULATION TIME ACTIVATED: CPT

## 2018-07-23 PROCEDURE — 82330 ASSAY OF CALCIUM: CPT

## 2018-07-23 PROCEDURE — 71048 X-RAY EXAM CHEST 4+ VIEWS: CPT

## 2018-07-23 PROCEDURE — 82607 VITAMIN B-12: CPT

## 2018-07-23 RX ADMIN — DOCUSATE SODIUM 50MG AND SENNOSIDES 8.6MG SCH TAB: 8.6; 5 TABLET, FILM COATED ORAL at 09:00

## 2018-07-23 RX ADMIN — GABAPENTIN SCH MG: 300 CAPSULE ORAL at 22:12

## 2018-07-23 RX ADMIN — MUPIROCIN SCH APPLIC: 20 OINTMENT TOPICAL at 22:13

## 2018-07-23 RX ADMIN — SODIUM CHLORIDE SCH MLS/HR: 0.9 INJECTION, SOLUTION INTRAVENOUS at 18:25

## 2018-07-23 RX ADMIN — ASPIRIN 81 MG SCH MG: 81 TABLET ORAL at 21:00

## 2018-07-23 RX ADMIN — GABAPENTIN SCH MG: 300 CAPSULE ORAL at 16:12

## 2018-07-23 RX ADMIN — SODIUM CHLORIDE SCH MLS/HR: 0.9 INJECTION, SOLUTION INTRAVENOUS at 05:47

## 2018-07-23 RX ADMIN — ALLOPURINOL SCH MG: 300 TABLET ORAL at 09:26

## 2018-07-23 RX ADMIN — BENAZEPRIL HYDROCHLORIDE SCH MG: 20 TABLET, COATED ORAL at 09:00

## 2018-07-23 RX ADMIN — ATORVASTATIN CALCIUM SCH MG: 80 TABLET, FILM COATED ORAL at 09:26

## 2018-07-23 RX ADMIN — GABAPENTIN SCH MG: 300 CAPSULE ORAL at 09:26

## 2018-07-23 RX ADMIN — SODIUM CHLORIDE, PRESERVATIVE FREE SCH ML: 5 INJECTION INTRAVENOUS at 22:15

## 2018-07-24 VITALS — DIASTOLIC BLOOD PRESSURE: 74 MMHG | SYSTOLIC BLOOD PRESSURE: 160 MMHG

## 2018-07-24 VITALS — DIASTOLIC BLOOD PRESSURE: 79 MMHG | SYSTOLIC BLOOD PRESSURE: 160 MMHG

## 2018-07-24 VITALS — SYSTOLIC BLOOD PRESSURE: 164 MMHG | DIASTOLIC BLOOD PRESSURE: 72 MMHG

## 2018-07-24 VITALS — SYSTOLIC BLOOD PRESSURE: 132 MMHG | DIASTOLIC BLOOD PRESSURE: 71 MMHG

## 2018-07-24 LAB
ANION GAP SERPL CALC-SCNC: 7 MMOL/L (ref 5–15)
BASOPHILS # BLD AUTO: 0.01 X10^3/UL (ref 0–0.1)
BASOPHILS NFR BLD AUTO: 0 % (ref 0–1)
CALCIUM SERPL-MCNC: 8.5 MG/DL (ref 8.5–10.1)
CHLORIDE SERPL-SCNC: 112 MMOL/L (ref 98–107)
CHOL/HDL RATIO: 2.4
CREAT SERPL-MCNC: 0.94 MG/DL (ref 0.7–1.3)
EOSINOPHIL # BLD AUTO: 0.15 X10^3/UL (ref 0–0.4)
EOSINOPHIL NFR BLD AUTO: 3 % (ref 1–7)
ERYTHROCYTE [DISTWIDTH] IN BLOOD BY AUTOMATED COUNT: 14.5 % (ref 9.4–14.8)
GLUCOSE BY BLOOD GAS ANALYZER: 120 MG/DL (ref 70–110)
HBV CORE AB SER QL: 3 MMOL/L (ref 3.6–5.5)
HDL CHOL %: 42 % (ref 26–37)
HDL CHOLESTEROL (DIRECT): 46 MG/DL (ref 40–60)
HEMATOCRIT CALCULATED: 35 % (ref 42–52)
HEMOGLOBIN BY BLOOD GAS ANALYZ: 11.9 G/DL (ref 14–18)
LDL CHOLESTEROL,CALCULATED: 42 MG/DL (ref 54–169)
LDLC/HDLC SERPL: 0.9 {RATIO} (ref 0.5–3)
LYMPHOCYTES # BLD AUTO: 1.25 X10^3/UL (ref 1–3.4)
LYMPHOCYTES NFR BLD AUTO: 22 % (ref 22–44)
MCH RBC QN AUTO: 35.8 PG (ref 27.5–34.5)
MCHC RBC AUTO-ENTMCNC: 34.1 G/DL (ref 33.2–36.2)
MCV RBC AUTO: 105 FL (ref 81–97)
MD: NO
MONOCYTES # BLD AUTO: 0.58 X10^3/UL (ref 0.2–0.8)
MONOCYTES NFR BLD AUTO: 10 % (ref 2–9)
NEUTROPHILS # BLD AUTO: 3.68 X10^3/UL (ref 1.8–6.8)
NEUTROPHILS NFR BLD AUTO: 65 % (ref 42–75)
O2/TOTAL GAS SETTING VFR VENT: 60 %
PLATELET # BLD AUTO: 110 X10^3/UL (ref 130–400)
PMV BLD AUTO: 7.1 FL (ref 7.4–10.4)
RBC # BLD AUTO: 4.37 X10^6/UL (ref 4.38–5.82)
TRIGL SERPL-MCNC: 112 MG/DL (ref 50–200)
TROPONIN I SERPL-MCNC: 2.71 NG/ML (ref 0–0.04)
VLDLC SERPL CALC-MCNC: 22 MG/DL (ref 0–25)

## 2018-07-24 PROCEDURE — 06BP4ZZ EXCISION OF RIGHT SAPHENOUS VEIN, PERCUTANEOUS ENDOSCOPIC APPROACH: ICD-10-PCS | Performed by: THORACIC SURGERY (CARDIOTHORACIC VASCULAR SURGERY)

## 2018-07-24 PROCEDURE — 30233R1 TRANSFUSION OF NONAUTOLOGOUS PLATELETS INTO PERIPHERAL VEIN, PERCUTANEOUS APPROACH: ICD-10-PCS | Performed by: THORACIC SURGERY (CARDIOTHORACIC VASCULAR SURGERY)

## 2018-07-24 PROCEDURE — 5A1221Z PERFORMANCE OF CARDIAC OUTPUT, CONTINUOUS: ICD-10-PCS | Performed by: THORACIC SURGERY (CARDIOTHORACIC VASCULAR SURGERY)

## 2018-07-24 PROCEDURE — 02100Z9 BYPASS CORONARY ARTERY, ONE ARTERY FROM LEFT INTERNAL MAMMARY, OPEN APPROACH: ICD-10-PCS | Performed by: THORACIC SURGERY (CARDIOTHORACIC VASCULAR SURGERY)

## 2018-07-24 PROCEDURE — B24BZZ4 ULTRASONOGRAPHY OF HEART WITH AORTA, TRANSESOPHAGEAL: ICD-10-PCS | Performed by: THORACIC SURGERY (CARDIOTHORACIC VASCULAR SURGERY)

## 2018-07-24 RX ADMIN — INSULIN LISPRO SCH UNITS: 100 INJECTION, SOLUTION INTRAVENOUS; SUBCUTANEOUS at 16:00

## 2018-07-24 RX ADMIN — VANCOMYCIN HYDROCHLORIDE SCH MLS/HR: 1 INJECTION, POWDER, LYOPHILIZED, FOR SOLUTION INTRAVENOUS at 23:04

## 2018-07-24 RX ADMIN — MUPIROCIN SCH APPLIC: 20 OINTMENT TOPICAL at 21:24

## 2018-07-24 RX ADMIN — GABAPENTIN SCH MG: 300 CAPSULE ORAL at 08:20

## 2018-07-24 RX ADMIN — Medication SCH NOTE: at 21:00

## 2018-07-24 RX ADMIN — MUPIROCIN SCH APPLIC: 20 OINTMENT TOPICAL at 21:00

## 2018-07-24 RX ADMIN — INSULIN LISPRO SCH UNITS: 100 INJECTION, SOLUTION INTRAVENOUS; SUBCUTANEOUS at 21:00

## 2018-07-24 RX ADMIN — SODIUM CHLORIDE, PRESERVATIVE FREE SCH ML: 5 INJECTION INTRAVENOUS at 21:23

## 2018-07-24 RX ADMIN — SODIUM CHLORIDE SCH MLS/HR: 0.9 INJECTION, SOLUTION INTRAVENOUS at 08:33

## 2018-07-24 RX ADMIN — ATORVASTATIN CALCIUM SCH MG: 80 TABLET, FILM COATED ORAL at 08:29

## 2018-07-24 RX ADMIN — SODIUM CHLORIDE, PRESERVATIVE FREE SCH ML: 5 INJECTION INTRAVENOUS at 21:00

## 2018-07-24 RX ADMIN — ASPIRIN 81 MG SCH MG: 81 TABLET ORAL at 21:00

## 2018-07-24 RX ADMIN — MORPHINE SULFATE PRN MG: 10 INJECTION INTRAVENOUS at 22:49

## 2018-07-24 RX ADMIN — Medication SCH MG: at 09:46

## 2018-07-24 RX ADMIN — Medication SCH NOTE: at 15:00

## 2018-07-24 RX ADMIN — SODIUM CHLORIDE SCH MLS/HR: 0.9 INJECTION, SOLUTION INTRAVENOUS at 22:20

## 2018-07-24 RX ADMIN — GABAPENTIN SCH MG: 300 CAPSULE ORAL at 16:00

## 2018-07-24 RX ADMIN — DOCUSATE SODIUM SCH MG: 100 CAPSULE, LIQUID FILLED ORAL at 09:00

## 2018-07-24 RX ADMIN — ALLOPURINOL SCH MG: 300 TABLET ORAL at 08:21

## 2018-07-24 RX ADMIN — MORPHINE SULFATE PRN MG: 10 INJECTION INTRAVENOUS at 16:21

## 2018-07-24 RX ADMIN — DOCUSATE SODIUM SCH MG: 100 CAPSULE, LIQUID FILLED ORAL at 21:00

## 2018-07-24 RX ADMIN — GABAPENTIN SCH MG: 300 CAPSULE ORAL at 21:00

## 2018-07-24 RX ADMIN — MUPIROCIN SCH APPLIC: 20 OINTMENT TOPICAL at 08:33

## 2018-07-24 RX ADMIN — SODIUM CHLORIDE, PRESERVATIVE FREE SCH ML: 5 INJECTION INTRAVENOUS at 08:31

## 2018-07-24 RX ADMIN — Medication SCH MG: at 21:00

## 2018-07-24 RX ADMIN — BENAZEPRIL HYDROCHLORIDE SCH MG: 20 TABLET, COATED ORAL at 08:31

## 2018-07-24 RX ADMIN — DOCUSATE SODIUM 50MG AND SENNOSIDES 8.6MG SCH TAB: 8.6; 5 TABLET, FILM COATED ORAL at 08:21

## 2018-07-25 LAB
ALBUMIN SERPL-MCNC: 2.9 G/DL (ref 3.4–5)
ANION GAP SERPL CALC-SCNC: 5 MMOL/L (ref 5–15)
APTT BLD: 29 SECONDS (ref 25–31)
BASOPHILS # BLD AUTO: 0.01 X10^3/UL (ref 0–0.1)
BASOPHILS NFR BLD AUTO: 0 % (ref 0–1)
CALCIUM SERPL-MCNC: 8 MG/DL (ref 8.5–10.1)
CHLORIDE SERPL-SCNC: 116 MMOL/L (ref 98–107)
CREAT SERPL-MCNC: 0.92 MG/DL (ref 0.7–1.3)
EOSINOPHIL # BLD AUTO: 0 X10^3/UL (ref 0–0.4)
EOSINOPHIL NFR BLD AUTO: 0 % (ref 1–7)
ERYTHROCYTE [DISTWIDTH] IN BLOOD BY AUTOMATED COUNT: 14.5 % (ref 9.4–14.8)
INR PPP: 1.16 (ref 0.93–1.1)
LYMPHOCYTES # BLD AUTO: 0.37 X10^3/UL (ref 1–3.4)
LYMPHOCYTES NFR BLD AUTO: 6 % (ref 22–44)
MCH RBC QN AUTO: 37.1 PG (ref 27.5–34.5)
MCHC RBC AUTO-ENTMCNC: 35.1 G/DL (ref 33.2–36.2)
MCV RBC AUTO: 105.7 FL (ref 81–97)
MD: NO
MONOCYTES # BLD AUTO: 0.59 X10^3/UL (ref 0.2–0.8)
MONOCYTES NFR BLD AUTO: 10 % (ref 2–9)
NEUTROPHILS # BLD AUTO: 5.03 X10^3/UL (ref 1.8–6.8)
NEUTROPHILS NFR BLD AUTO: 84 % (ref 42–75)
PLATELET # BLD AUTO: 104 X10^3/UL (ref 130–400)
PMV BLD AUTO: 8.2 FL (ref 7.4–10.4)
PROTHROMBIN TIME: 12 SECONDS (ref 9.6–11.5)
RBC # BLD AUTO: 3.27 X10^6/UL (ref 4.38–5.82)

## 2018-07-25 RX ADMIN — ALLOPURINOL SCH MG: 300 TABLET ORAL at 08:37

## 2018-07-25 RX ADMIN — MAGNESIUM SULFATE HEPTAHYDRATE SCH MLS/HR: 500 INJECTION, SOLUTION INTRAMUSCULAR; INTRAVENOUS at 15:30

## 2018-07-25 RX ADMIN — CHLORHEXIDINE GLUCONATE SCH ML: 1.2 RINSE ORAL at 15:00

## 2018-07-25 RX ADMIN — ASPIRIN 81 MG SCH MG: 81 TABLET ORAL at 20:55

## 2018-07-25 RX ADMIN — Medication SCH MG: at 08:32

## 2018-07-25 RX ADMIN — GABAPENTIN SCH MG: 300 CAPSULE ORAL at 15:59

## 2018-07-25 RX ADMIN — METOPROLOL TARTRATE SCH MG: 25 TABLET, FILM COATED ORAL at 08:37

## 2018-07-25 RX ADMIN — SODIUM CHLORIDE, PRESERVATIVE FREE SCH ML: 5 INJECTION INTRAVENOUS at 08:33

## 2018-07-25 RX ADMIN — HYDROCODONE BITARTRATE AND ACETAMINOPHEN PRN TAB: 5; 325 TABLET ORAL at 07:42

## 2018-07-25 RX ADMIN — MORPHINE SULFATE PRN MG: 10 INJECTION INTRAVENOUS at 01:12

## 2018-07-25 RX ADMIN — INSULIN LISPRO SCH UNITS: 100 INJECTION, SOLUTION INTRAVENOUS; SUBCUTANEOUS at 21:00

## 2018-07-25 RX ADMIN — ONDANSETRON PRN MG: 2 INJECTION, SOLUTION INTRAMUSCULAR; INTRAVENOUS at 07:34

## 2018-07-25 RX ADMIN — ATORVASTATIN CALCIUM SCH MG: 80 TABLET, FILM COATED ORAL at 08:44

## 2018-07-25 RX ADMIN — SODIUM CHLORIDE, PRESERVATIVE FREE SCH ML: 5 INJECTION INTRAVENOUS at 20:58

## 2018-07-25 RX ADMIN — SODIUM CHLORIDE, PRESERVATIVE FREE SCH ML: 5 INJECTION INTRAVENOUS at 08:38

## 2018-07-25 RX ADMIN — DOCUSATE SODIUM SCH MG: 100 CAPSULE, LIQUID FILLED ORAL at 20:56

## 2018-07-25 RX ADMIN — INSULIN LISPRO SCH UNITS: 100 INJECTION, SOLUTION INTRAVENOUS; SUBCUTANEOUS at 11:08

## 2018-07-25 RX ADMIN — GABAPENTIN SCH MG: 300 CAPSULE ORAL at 20:55

## 2018-07-25 RX ADMIN — GABAPENTIN SCH MG: 300 CAPSULE ORAL at 08:37

## 2018-07-25 RX ADMIN — Medication SCH NOTE: at 03:00

## 2018-07-25 RX ADMIN — Medication SCH NOTE: at 09:00

## 2018-07-25 RX ADMIN — INSULIN LISPRO SCH UNITS: 100 INJECTION, SOLUTION INTRAVENOUS; SUBCUTANEOUS at 16:00

## 2018-07-25 RX ADMIN — MUPIROCIN SCH APPLIC: 20 OINTMENT TOPICAL at 08:39

## 2018-07-25 RX ADMIN — CHLORDIAZEPOXIDE HYDROCHLORIDE SCH MG: 25 CAPSULE ORAL at 15:59

## 2018-07-25 RX ADMIN — DOCUSATE SODIUM SCH MG: 100 CAPSULE, LIQUID FILLED ORAL at 08:37

## 2018-07-25 RX ADMIN — HYDROCODONE BITARTRATE AND ACETAMINOPHEN PRN TAB: 5; 325 TABLET ORAL at 13:10

## 2018-07-25 RX ADMIN — MUPIROCIN SCH APPLIC: 20 OINTMENT TOPICAL at 20:58

## 2018-07-25 RX ADMIN — DOCUSATE SODIUM 50MG AND SENNOSIDES 8.6MG SCH TAB: 8.6; 5 TABLET, FILM COATED ORAL at 08:39

## 2018-07-25 RX ADMIN — VANCOMYCIN HYDROCHLORIDE SCH MLS/HR: 1 INJECTION, POWDER, LYOPHILIZED, FOR SOLUTION INTRAVENOUS at 11:07

## 2018-07-25 RX ADMIN — MUPIROCIN SCH APPLIC: 20 OINTMENT TOPICAL at 08:38

## 2018-07-25 RX ADMIN — METOPROLOL TARTRATE SCH MG: 25 TABLET, FILM COATED ORAL at 20:56

## 2018-07-25 RX ADMIN — INSULIN LISPRO SCH UNITS: 100 INJECTION, SOLUTION INTRAVENOUS; SUBCUTANEOUS at 07:00

## 2018-07-25 RX ADMIN — CHLORDIAZEPOXIDE HYDROCHLORIDE SCH MG: 25 CAPSULE ORAL at 20:55

## 2018-07-26 VITALS — DIASTOLIC BLOOD PRESSURE: 62 MMHG | SYSTOLIC BLOOD PRESSURE: 115 MMHG

## 2018-07-26 VITALS — SYSTOLIC BLOOD PRESSURE: 120 MMHG | DIASTOLIC BLOOD PRESSURE: 67 MMHG

## 2018-07-26 VITALS — DIASTOLIC BLOOD PRESSURE: 73 MMHG | SYSTOLIC BLOOD PRESSURE: 166 MMHG

## 2018-07-26 LAB
ANION GAP SERPL CALC-SCNC: 6 MMOL/L (ref 5–15)
APTT BLD: 28 SECONDS (ref 25–31)
BASOPHILS # BLD AUTO: 0.03 X10^3/UL (ref 0–0.1)
BASOPHILS NFR BLD AUTO: 0 % (ref 0–1)
CALCIUM SERPL-MCNC: 7.9 MG/DL (ref 8.5–10.1)
CHLORIDE SERPL-SCNC: 113 MMOL/L (ref 98–107)
CREAT SERPL-MCNC: 0.73 MG/DL (ref 0.7–1.3)
EOSINOPHIL # BLD AUTO: 0.15 X10^3/UL (ref 0–0.4)
EOSINOPHIL NFR BLD AUTO: 2 % (ref 1–7)
ERYTHROCYTE [DISTWIDTH] IN BLOOD BY AUTOMATED COUNT: 14.9 % (ref 9.4–14.8)
FOLATE SERPL-MCNC: > 20 NG/ML (ref 3.1–17.5)
INR PPP: 1.16 (ref 0.93–1.1)
LYMPHOCYTES # BLD AUTO: 1.1 X10^3/UL (ref 1–3.4)
LYMPHOCYTES NFR BLD AUTO: 13 % (ref 22–44)
MCH RBC QN AUTO: 36.5 PG (ref 27.5–34.5)
MCHC RBC AUTO-ENTMCNC: 34.3 G/DL (ref 33.2–36.2)
MCV RBC AUTO: 106.4 FL (ref 81–97)
MD: (no result)
MONOCYTES # BLD AUTO: 0.94 X10^3/UL (ref 0.2–0.8)
MONOCYTES NFR BLD AUTO: 11 % (ref 2–9)
NEUTROPHILS # BLD AUTO: 6.43 X10^3/UL (ref 1.8–6.8)
NEUTROPHILS NFR BLD AUTO: 74 % (ref 42–75)
O2 FLOW: 3 L/MIN
PLATELET # BLD AUTO: 99 X10^3/UL (ref 130–400)
PMV BLD AUTO: 7.7 FL (ref 7.4–10.4)
PROTHROMBIN TIME: 12 SECONDS (ref 9.6–11.5)
RBC # BLD AUTO: 3.33 X10^6/UL (ref 4.38–5.82)

## 2018-07-26 RX ADMIN — CHLORDIAZEPOXIDE HYDROCHLORIDE SCH MG: 25 CAPSULE ORAL at 09:04

## 2018-07-26 RX ADMIN — MUPIROCIN SCH APPLIC: 20 OINTMENT TOPICAL at 20:37

## 2018-07-26 RX ADMIN — MAGNESIUM SULFATE HEPTAHYDRATE SCH MLS/HR: 500 INJECTION, SOLUTION INTRAMUSCULAR; INTRAVENOUS at 16:38

## 2018-07-26 RX ADMIN — DOCUSATE SODIUM 50MG AND SENNOSIDES 8.6MG SCH TAB: 8.6; 5 TABLET, FILM COATED ORAL at 09:00

## 2018-07-26 RX ADMIN — GABAPENTIN SCH MG: 300 CAPSULE ORAL at 09:05

## 2018-07-26 RX ADMIN — BENAZEPRIL HYDROCHLORIDE SCH MG: 20 TABLET, COATED ORAL at 20:38

## 2018-07-26 RX ADMIN — BENAZEPRIL HYDROCHLORIDE SCH MG: 20 TABLET, COATED ORAL at 09:05

## 2018-07-26 RX ADMIN — GABAPENTIN SCH MG: 300 CAPSULE ORAL at 16:38

## 2018-07-26 RX ADMIN — SODIUM CHLORIDE, PRESERVATIVE FREE SCH ML: 5 INJECTION INTRAVENOUS at 09:11

## 2018-07-26 RX ADMIN — DOCUSATE SODIUM SCH MG: 100 CAPSULE, LIQUID FILLED ORAL at 20:38

## 2018-07-26 RX ADMIN — INSULIN LISPRO SCH UNITS: 100 INJECTION, SOLUTION INTRAVENOUS; SUBCUTANEOUS at 07:00

## 2018-07-26 RX ADMIN — INSULIN LISPRO SCH UNITS: 100 INJECTION, SOLUTION INTRAVENOUS; SUBCUTANEOUS at 11:00

## 2018-07-26 RX ADMIN — ONDANSETRON PRN MG: 2 INJECTION, SOLUTION INTRAMUSCULAR; INTRAVENOUS at 06:35

## 2018-07-26 RX ADMIN — GABAPENTIN SCH MG: 300 CAPSULE ORAL at 20:38

## 2018-07-26 RX ADMIN — CHLORHEXIDINE GLUCONATE SCH ML: 1.2 RINSE ORAL at 15:00

## 2018-07-26 RX ADMIN — METOPROLOL TARTRATE SCH MG: 25 TABLET, FILM COATED ORAL at 09:04

## 2018-07-26 RX ADMIN — DOCUSATE SODIUM SCH MG: 100 CAPSULE, LIQUID FILLED ORAL at 09:04

## 2018-07-26 RX ADMIN — INSULIN LISPRO SCH UNITS: 100 INJECTION, SOLUTION INTRAVENOUS; SUBCUTANEOUS at 16:00

## 2018-07-26 RX ADMIN — INSULIN LISPRO SCH UNITS: 100 INJECTION, SOLUTION INTRAVENOUS; SUBCUTANEOUS at 20:37

## 2018-07-26 RX ADMIN — CHLORHEXIDINE GLUCONATE SCH ML: 1.2 RINSE ORAL at 03:00

## 2018-07-26 RX ADMIN — ENOXAPARIN SODIUM SCH MG: 40 INJECTION SUBCUTANEOUS at 09:00

## 2018-07-26 RX ADMIN — MUPIROCIN SCH APPLIC: 20 OINTMENT TOPICAL at 09:11

## 2018-07-26 RX ADMIN — ASPIRIN 81 MG SCH MG: 81 TABLET ORAL at 20:39

## 2018-07-26 RX ADMIN — ATORVASTATIN CALCIUM SCH MG: 80 TABLET, FILM COATED ORAL at 09:03

## 2018-07-26 RX ADMIN — METOPROLOL TARTRATE SCH MG: 25 TABLET, FILM COATED ORAL at 20:39

## 2018-07-26 RX ADMIN — POTASSIUM CHLORIDE SCH MEQ: 750 TABLET, FILM COATED, EXTENDED RELEASE ORAL at 09:04

## 2018-07-26 RX ADMIN — ALLOPURINOL SCH MG: 300 TABLET ORAL at 09:04

## 2018-07-26 RX ADMIN — SODIUM CHLORIDE, PRESERVATIVE FREE SCH ML: 5 INJECTION INTRAVENOUS at 20:39

## 2018-07-26 RX ADMIN — CHLORDIAZEPOXIDE HYDROCHLORIDE SCH MG: 25 CAPSULE ORAL at 20:38

## 2018-07-27 VITALS — SYSTOLIC BLOOD PRESSURE: 117 MMHG | DIASTOLIC BLOOD PRESSURE: 82 MMHG

## 2018-07-27 VITALS — DIASTOLIC BLOOD PRESSURE: 59 MMHG | SYSTOLIC BLOOD PRESSURE: 104 MMHG

## 2018-07-27 VITALS — SYSTOLIC BLOOD PRESSURE: 124 MMHG | DIASTOLIC BLOOD PRESSURE: 68 MMHG

## 2018-07-27 VITALS — DIASTOLIC BLOOD PRESSURE: 74 MMHG | SYSTOLIC BLOOD PRESSURE: 131 MMHG

## 2018-07-27 LAB
ANION GAP SERPL CALC-SCNC: 6 MMOL/L (ref 5–15)
BASOPHILS # BLD AUTO: 0.02 X10^3/UL (ref 0–0.1)
BASOPHILS NFR BLD AUTO: 0 % (ref 0–1)
CALCIUM SERPL-MCNC: 8 MG/DL (ref 8.5–10.1)
CHLORIDE SERPL-SCNC: 110 MMOL/L (ref 98–107)
CREAT SERPL-MCNC: 0.87 MG/DL (ref 0.7–1.3)
EOSINOPHIL # BLD AUTO: 0.27 X10^3/UL (ref 0–0.4)
EOSINOPHIL NFR BLD AUTO: 3 % (ref 1–7)
ERYTHROCYTE [DISTWIDTH] IN BLOOD BY AUTOMATED COUNT: 14.6 % (ref 9.4–14.8)
LYMPHOCYTES # BLD AUTO: 1.29 X10^3/UL (ref 1–3.4)
LYMPHOCYTES NFR BLD AUTO: 16 % (ref 22–44)
MCH RBC QN AUTO: 35.5 PG (ref 27.5–34.5)
MCHC RBC AUTO-ENTMCNC: 33.4 G/DL (ref 33.2–36.2)
MCV RBC AUTO: 106.3 FL (ref 81–97)
MD: NO
MONOCYTES # BLD AUTO: 0.84 X10^3/UL (ref 0.2–0.8)
MONOCYTES NFR BLD AUTO: 10 % (ref 2–9)
NEUTROPHILS # BLD AUTO: 5.62 X10^3/UL (ref 1.8–6.8)
NEUTROPHILS NFR BLD AUTO: 70 % (ref 42–75)
PLATELET # BLD AUTO: 107 X10^3/UL (ref 130–400)
PMV BLD AUTO: 7.9 FL (ref 7.4–10.4)
RBC # BLD AUTO: 3.1 X10^6/UL (ref 4.38–5.82)

## 2018-07-27 RX ADMIN — DOCUSATE SODIUM SCH MG: 100 CAPSULE, LIQUID FILLED ORAL at 08:48

## 2018-07-27 RX ADMIN — METOCLOPRAMIDE SCH MG: 5 INJECTION, SOLUTION INTRAMUSCULAR; INTRAVENOUS at 14:02

## 2018-07-27 RX ADMIN — GABAPENTIN SCH MG: 300 CAPSULE ORAL at 21:10

## 2018-07-27 RX ADMIN — GABAPENTIN SCH MG: 300 CAPSULE ORAL at 15:40

## 2018-07-27 RX ADMIN — CHLORHEXIDINE GLUCONATE SCH ML: 1.2 RINSE ORAL at 04:55

## 2018-07-27 RX ADMIN — INSULIN LISPRO SCH UNITS: 100 INJECTION, SOLUTION INTRAVENOUS; SUBCUTANEOUS at 11:00

## 2018-07-27 RX ADMIN — POTASSIUM CHLORIDE SCH MEQ: 750 TABLET, FILM COATED, EXTENDED RELEASE ORAL at 08:00

## 2018-07-27 RX ADMIN — GABAPENTIN SCH MG: 300 CAPSULE ORAL at 08:48

## 2018-07-27 RX ADMIN — POTASSIUM CHLORIDE SCH MEQ: 750 TABLET, FILM COATED, EXTENDED RELEASE ORAL at 21:10

## 2018-07-27 RX ADMIN — BENAZEPRIL HYDROCHLORIDE SCH MG: 20 TABLET, COATED ORAL at 08:49

## 2018-07-27 RX ADMIN — ALLOPURINOL SCH MG: 300 TABLET ORAL at 08:49

## 2018-07-27 RX ADMIN — SODIUM CHLORIDE, PRESERVATIVE FREE SCH ML: 5 INJECTION INTRAVENOUS at 21:03

## 2018-07-27 RX ADMIN — INSULIN LISPRO SCH UNITS: 100 INJECTION, SOLUTION INTRAVENOUS; SUBCUTANEOUS at 07:00

## 2018-07-27 RX ADMIN — METOPROLOL TARTRATE SCH MG: 25 TABLET, FILM COATED ORAL at 21:10

## 2018-07-27 RX ADMIN — BENAZEPRIL HYDROCHLORIDE SCH MG: 20 TABLET, COATED ORAL at 21:10

## 2018-07-27 RX ADMIN — METOPROLOL TARTRATE SCH MG: 25 TABLET, FILM COATED ORAL at 08:49

## 2018-07-27 RX ADMIN — SODIUM CHLORIDE, PRESERVATIVE FREE SCH ML: 5 INJECTION INTRAVENOUS at 08:50

## 2018-07-27 RX ADMIN — ENOXAPARIN SODIUM SCH MG: 40 INJECTION SUBCUTANEOUS at 08:34

## 2018-07-27 RX ADMIN — ATORVASTATIN CALCIUM SCH MG: 80 TABLET, FILM COATED ORAL at 21:10

## 2018-07-27 RX ADMIN — DOCUSATE SODIUM SCH MG: 100 CAPSULE, LIQUID FILLED ORAL at 21:09

## 2018-07-27 RX ADMIN — FUROSEMIDE SCH MG: 10 INJECTION, SOLUTION INTRAVENOUS at 21:03

## 2018-07-27 RX ADMIN — METOCLOPRAMIDE SCH MG: 5 INJECTION, SOLUTION INTRAMUSCULAR; INTRAVENOUS at 21:00

## 2018-07-27 RX ADMIN — AMIODARONE HYDROCHLORIDE SCH MG: 200 TABLET ORAL at 09:01

## 2018-07-27 RX ADMIN — DOCUSATE SODIUM 50MG AND SENNOSIDES 8.6MG SCH TAB: 8.6; 5 TABLET, FILM COATED ORAL at 08:48

## 2018-07-27 RX ADMIN — MUPIROCIN SCH APPLIC: 20 OINTMENT TOPICAL at 08:56

## 2018-07-27 RX ADMIN — CLOPIDOGREL SCH MG: 75 TABLET, FILM COATED ORAL at 08:49

## 2018-07-27 RX ADMIN — ASPIRIN 81 MG SCH MG: 81 TABLET ORAL at 21:09

## 2018-07-27 RX ADMIN — POTASSIUM CHLORIDE SCH MEQ: 750 TABLET, FILM COATED, EXTENDED RELEASE ORAL at 08:58

## 2018-07-27 RX ADMIN — FUROSEMIDE SCH MG: 10 INJECTION, SOLUTION INTRAVENOUS at 08:59

## 2018-07-27 RX ADMIN — MUPIROCIN SCH APPLIC: 20 OINTMENT TOPICAL at 21:00

## 2018-07-27 RX ADMIN — AMIODARONE HYDROCHLORIDE SCH MG: 200 TABLET ORAL at 21:09

## 2018-07-28 VITALS — SYSTOLIC BLOOD PRESSURE: 151 MMHG | DIASTOLIC BLOOD PRESSURE: 64 MMHG

## 2018-07-28 VITALS — DIASTOLIC BLOOD PRESSURE: 47 MMHG | SYSTOLIC BLOOD PRESSURE: 93 MMHG

## 2018-07-28 VITALS — DIASTOLIC BLOOD PRESSURE: 72 MMHG | SYSTOLIC BLOOD PRESSURE: 144 MMHG

## 2018-07-28 VITALS — DIASTOLIC BLOOD PRESSURE: 61 MMHG | SYSTOLIC BLOOD PRESSURE: 133 MMHG

## 2018-07-28 VITALS — SYSTOLIC BLOOD PRESSURE: 108 MMHG | DIASTOLIC BLOOD PRESSURE: 57 MMHG

## 2018-07-28 VITALS — DIASTOLIC BLOOD PRESSURE: 77 MMHG | SYSTOLIC BLOOD PRESSURE: 145 MMHG

## 2018-07-28 LAB
ANION GAP SERPL CALC-SCNC: 5 MMOL/L (ref 5–15)
BASOPHILS # BLD AUTO: 0.04 X10^3/UL (ref 0–0.1)
BASOPHILS NFR BLD AUTO: 1 % (ref 0–1)
CALCIUM SERPL-MCNC: 8.1 MG/DL (ref 8.5–10.1)
CHLORIDE SERPL-SCNC: 108 MMOL/L (ref 98–107)
CREAT SERPL-MCNC: 0.82 MG/DL (ref 0.7–1.3)
EOSINOPHIL # BLD AUTO: 0.36 X10^3/UL (ref 0–0.4)
EOSINOPHIL NFR BLD AUTO: 6 % (ref 1–7)
ERYTHROCYTE [DISTWIDTH] IN BLOOD BY AUTOMATED COUNT: 14 % (ref 9.4–14.8)
LYMPHOCYTES # BLD AUTO: 1.07 X10^3/UL (ref 1–3.4)
LYMPHOCYTES NFR BLD AUTO: 17 % (ref 22–44)
MCH RBC QN AUTO: 36.7 PG (ref 27.5–34.5)
MCHC RBC AUTO-ENTMCNC: 34.7 G/DL (ref 33.2–36.2)
MCV RBC AUTO: 105.6 FL (ref 81–97)
MD: NO
MONOCYTES # BLD AUTO: 0.76 X10^3/UL (ref 0.2–0.8)
MONOCYTES NFR BLD AUTO: 12 % (ref 2–9)
NEUTROPHILS # BLD AUTO: 4.26 X10^3/UL (ref 1.8–6.8)
NEUTROPHILS NFR BLD AUTO: 66 % (ref 42–75)
PLATELET # BLD AUTO: 115 X10^3/UL (ref 130–400)
PMV BLD AUTO: 7.6 FL (ref 7.4–10.4)
RBC # BLD AUTO: 2.96 X10^6/UL (ref 4.38–5.82)

## 2018-07-28 RX ADMIN — MUPIROCIN SCH APPLIC: 20 OINTMENT TOPICAL at 20:57

## 2018-07-28 RX ADMIN — ALLOPURINOL SCH MG: 300 TABLET ORAL at 08:13

## 2018-07-28 RX ADMIN — ASPIRIN 81 MG SCH MG: 81 TABLET ORAL at 20:56

## 2018-07-28 RX ADMIN — METOPROLOL TARTRATE SCH MG: 25 TABLET, FILM COATED ORAL at 20:56

## 2018-07-28 RX ADMIN — BENAZEPRIL HYDROCHLORIDE SCH MG: 20 TABLET, COATED ORAL at 20:55

## 2018-07-28 RX ADMIN — DOCUSATE SODIUM SCH MG: 100 CAPSULE, LIQUID FILLED ORAL at 08:14

## 2018-07-28 RX ADMIN — GABAPENTIN SCH MG: 300 CAPSULE ORAL at 08:13

## 2018-07-28 RX ADMIN — DOCUSATE SODIUM 50MG AND SENNOSIDES 8.6MG SCH TAB: 8.6; 5 TABLET, FILM COATED ORAL at 08:14

## 2018-07-28 RX ADMIN — METOPROLOL TARTRATE SCH MG: 25 TABLET, FILM COATED ORAL at 08:14

## 2018-07-28 RX ADMIN — GABAPENTIN SCH MG: 300 CAPSULE ORAL at 20:57

## 2018-07-28 RX ADMIN — METOCLOPRAMIDE SCH MG: 5 INJECTION, SOLUTION INTRAMUSCULAR; INTRAVENOUS at 23:24

## 2018-07-28 RX ADMIN — POTASSIUM CHLORIDE SCH MEQ: 750 TABLET, FILM COATED, EXTENDED RELEASE ORAL at 20:57

## 2018-07-28 RX ADMIN — METOCLOPRAMIDE SCH MG: 5 INJECTION, SOLUTION INTRAMUSCULAR; INTRAVENOUS at 03:59

## 2018-07-28 RX ADMIN — POLYETHYLENE GLYCOL 3350 SCH GM: 17 POWDER, FOR SOLUTION ORAL at 09:00

## 2018-07-28 RX ADMIN — ENOXAPARIN SODIUM SCH MG: 40 INJECTION SUBCUTANEOUS at 08:16

## 2018-07-28 RX ADMIN — BENAZEPRIL HYDROCHLORIDE SCH MG: 20 TABLET, COATED ORAL at 08:23

## 2018-07-28 RX ADMIN — GABAPENTIN SCH MG: 300 CAPSULE ORAL at 17:20

## 2018-07-28 RX ADMIN — AMIODARONE HYDROCHLORIDE SCH MG: 200 TABLET ORAL at 20:56

## 2018-07-28 RX ADMIN — METOCLOPRAMIDE SCH MG: 5 INJECTION, SOLUTION INTRAMUSCULAR; INTRAVENOUS at 17:20

## 2018-07-28 RX ADMIN — DOCUSATE SODIUM SCH MG: 100 CAPSULE, LIQUID FILLED ORAL at 20:57

## 2018-07-28 RX ADMIN — AMIODARONE HYDROCHLORIDE SCH MG: 200 TABLET ORAL at 08:13

## 2018-07-28 RX ADMIN — METOCLOPRAMIDE SCH MG: 5 INJECTION, SOLUTION INTRAMUSCULAR; INTRAVENOUS at 08:15

## 2018-07-28 RX ADMIN — ATORVASTATIN CALCIUM SCH MG: 80 TABLET, FILM COATED ORAL at 08:14

## 2018-07-28 RX ADMIN — FUROSEMIDE SCH MG: 10 INJECTION, SOLUTION INTRAVENOUS at 20:57

## 2018-07-28 RX ADMIN — MUPIROCIN SCH APPLIC: 20 OINTMENT TOPICAL at 08:24

## 2018-07-28 RX ADMIN — FUROSEMIDE SCH MG: 10 INJECTION, SOLUTION INTRAVENOUS at 08:15

## 2018-07-28 RX ADMIN — POTASSIUM CHLORIDE SCH MEQ: 750 TABLET, FILM COATED, EXTENDED RELEASE ORAL at 08:15

## 2018-07-28 RX ADMIN — SODIUM CHLORIDE, PRESERVATIVE FREE SCH ML: 5 INJECTION INTRAVENOUS at 08:24

## 2018-07-28 RX ADMIN — CLOPIDOGREL SCH MG: 75 TABLET, FILM COATED ORAL at 08:13

## 2018-07-28 RX ADMIN — SODIUM CHLORIDE, PRESERVATIVE FREE SCH ML: 5 INJECTION INTRAVENOUS at 20:58

## 2018-07-29 VITALS — DIASTOLIC BLOOD PRESSURE: 71 MMHG | SYSTOLIC BLOOD PRESSURE: 132 MMHG

## 2018-07-29 VITALS — SYSTOLIC BLOOD PRESSURE: 119 MMHG | DIASTOLIC BLOOD PRESSURE: 69 MMHG

## 2018-07-29 VITALS — DIASTOLIC BLOOD PRESSURE: 65 MMHG | SYSTOLIC BLOOD PRESSURE: 117 MMHG

## 2018-07-29 VITALS — DIASTOLIC BLOOD PRESSURE: 62 MMHG | SYSTOLIC BLOOD PRESSURE: 101 MMHG

## 2018-07-29 LAB
ANION GAP SERPL CALC-SCNC: 7 MMOL/L (ref 5–15)
BASOPHILS # BLD AUTO: 0.05 X10^3/UL (ref 0–0.1)
BASOPHILS NFR BLD AUTO: 1 % (ref 0–1)
CALCIUM SERPL-MCNC: 8.3 MG/DL (ref 8.5–10.1)
CHLORIDE SERPL-SCNC: 107 MMOL/L (ref 98–107)
CREAT SERPL-MCNC: 0.98 MG/DL (ref 0.7–1.3)
EOSINOPHIL # BLD AUTO: 0.25 X10^3/UL (ref 0–0.4)
EOSINOPHIL NFR BLD AUTO: 5 % (ref 1–7)
ERYTHROCYTE [DISTWIDTH] IN BLOOD BY AUTOMATED COUNT: 14.1 % (ref 9.4–14.8)
LYMPHOCYTES # BLD AUTO: 1.35 X10^3/UL (ref 1–3.4)
LYMPHOCYTES NFR BLD AUTO: 24 % (ref 22–44)
MCH RBC QN AUTO: 36.7 PG (ref 27.5–34.5)
MCHC RBC AUTO-ENTMCNC: 34.7 G/DL (ref 33.2–36.2)
MCV RBC AUTO: 105.8 FL (ref 81–97)
MD: NO
MONOCYTES # BLD AUTO: 0.74 X10^3/UL (ref 0.2–0.8)
MONOCYTES NFR BLD AUTO: 13 % (ref 2–9)
NEUTROPHILS # BLD AUTO: 3.29 X10^3/UL (ref 1.8–6.8)
NEUTROPHILS NFR BLD AUTO: 58 % (ref 42–75)
PLATELET # BLD AUTO: 132 X10^3/UL (ref 130–400)
PMV BLD AUTO: 7.5 FL (ref 7.4–10.4)
RBC # BLD AUTO: 2.85 X10^6/UL (ref 4.38–5.82)

## 2018-07-29 RX ADMIN — DOCUSATE SODIUM 50MG AND SENNOSIDES 8.6MG SCH TAB: 8.6; 5 TABLET, FILM COATED ORAL at 08:41

## 2018-07-29 RX ADMIN — GABAPENTIN SCH MG: 300 CAPSULE ORAL at 20:29

## 2018-07-29 RX ADMIN — GABAPENTIN SCH MG: 300 CAPSULE ORAL at 08:44

## 2018-07-29 RX ADMIN — SODIUM CHLORIDE, PRESERVATIVE FREE SCH ML: 5 INJECTION INTRAVENOUS at 20:30

## 2018-07-29 RX ADMIN — BENAZEPRIL HYDROCHLORIDE SCH MG: 20 TABLET, COATED ORAL at 08:43

## 2018-07-29 RX ADMIN — ENOXAPARIN SODIUM SCH MG: 40 INJECTION SUBCUTANEOUS at 08:40

## 2018-07-29 RX ADMIN — AMIODARONE HYDROCHLORIDE SCH MG: 200 TABLET ORAL at 16:09

## 2018-07-29 RX ADMIN — METOPROLOL TARTRATE SCH MG: 25 TABLET, FILM COATED ORAL at 08:42

## 2018-07-29 RX ADMIN — DOCUSATE SODIUM SCH MG: 100 CAPSULE, LIQUID FILLED ORAL at 08:41

## 2018-07-29 RX ADMIN — GABAPENTIN SCH MG: 300 CAPSULE ORAL at 16:09

## 2018-07-29 RX ADMIN — AMIODARONE HYDROCHLORIDE SCH MG: 200 TABLET ORAL at 08:42

## 2018-07-29 RX ADMIN — METOPROLOL TARTRATE SCH MG: 25 TABLET, FILM COATED ORAL at 20:29

## 2018-07-29 RX ADMIN — ATORVASTATIN CALCIUM SCH MG: 80 TABLET, FILM COATED ORAL at 08:41

## 2018-07-29 RX ADMIN — AMIODARONE HYDROCHLORIDE SCH MG: 200 TABLET ORAL at 20:28

## 2018-07-29 RX ADMIN — DOCUSATE SODIUM SCH MG: 100 CAPSULE, LIQUID FILLED ORAL at 20:30

## 2018-07-29 RX ADMIN — MUPIROCIN SCH APPLIC: 20 OINTMENT TOPICAL at 10:59

## 2018-07-29 RX ADMIN — FUROSEMIDE SCH MG: 10 INJECTION, SOLUTION INTRAVENOUS at 08:41

## 2018-07-29 RX ADMIN — AMIODARONE HYDROCHLORIDE SCH MG: 200 TABLET ORAL at 08:53

## 2018-07-29 RX ADMIN — BENAZEPRIL HYDROCHLORIDE SCH MG: 20 TABLET, COATED ORAL at 20:28

## 2018-07-29 RX ADMIN — ALLOPURINOL SCH MG: 300 TABLET ORAL at 08:42

## 2018-07-29 RX ADMIN — POTASSIUM CHLORIDE SCH MEQ: 750 TABLET, FILM COATED, EXTENDED RELEASE ORAL at 08:43

## 2018-07-29 RX ADMIN — POLYETHYLENE GLYCOL 3350 SCH GM: 17 POWDER, FOR SOLUTION ORAL at 08:44

## 2018-07-29 RX ADMIN — ASPIRIN 81 MG SCH MG: 81 TABLET ORAL at 20:28

## 2018-07-29 RX ADMIN — METOCLOPRAMIDE SCH MG: 5 INJECTION, SOLUTION INTRAMUSCULAR; INTRAVENOUS at 05:57

## 2018-07-29 RX ADMIN — CLOPIDOGREL SCH MG: 75 TABLET, FILM COATED ORAL at 08:41

## 2018-07-29 RX ADMIN — FUROSEMIDE SCH MG: 10 INJECTION, SOLUTION INTRAVENOUS at 20:29

## 2018-07-29 RX ADMIN — POTASSIUM CHLORIDE SCH MEQ: 750 TABLET, FILM COATED, EXTENDED RELEASE ORAL at 20:29

## 2018-07-29 RX ADMIN — SODIUM CHLORIDE, PRESERVATIVE FREE SCH ML: 5 INJECTION INTRAVENOUS at 08:45

## 2018-07-30 VITALS — DIASTOLIC BLOOD PRESSURE: 62 MMHG | SYSTOLIC BLOOD PRESSURE: 105 MMHG

## 2018-07-30 VITALS — DIASTOLIC BLOOD PRESSURE: 65 MMHG | SYSTOLIC BLOOD PRESSURE: 150 MMHG

## 2018-07-30 VITALS — DIASTOLIC BLOOD PRESSURE: 72 MMHG | SYSTOLIC BLOOD PRESSURE: 163 MMHG

## 2018-07-30 VITALS — DIASTOLIC BLOOD PRESSURE: 65 MMHG | SYSTOLIC BLOOD PRESSURE: 125 MMHG

## 2018-07-30 VITALS — DIASTOLIC BLOOD PRESSURE: 72 MMHG | SYSTOLIC BLOOD PRESSURE: 147 MMHG

## 2018-07-30 LAB
ANION GAP SERPL CALC-SCNC: 7 MMOL/L (ref 5–15)
CALCIUM SERPL-MCNC: 8.5 MG/DL (ref 8.5–10.1)
CHLORIDE SERPL-SCNC: 106 MMOL/L (ref 98–107)
CREAT SERPL-MCNC: 1.02 MG/DL (ref 0.7–1.3)

## 2018-07-30 RX ADMIN — FUROSEMIDE SCH MG: 10 INJECTION, SOLUTION INTRAVENOUS at 10:26

## 2018-07-30 RX ADMIN — ALLOPURINOL SCH MG: 300 TABLET ORAL at 10:27

## 2018-07-30 RX ADMIN — AMIODARONE HYDROCHLORIDE SCH MG: 200 TABLET ORAL at 22:02

## 2018-07-30 RX ADMIN — METOPROLOL TARTRATE SCH MG: 25 TABLET, FILM COATED ORAL at 10:27

## 2018-07-30 RX ADMIN — FUROSEMIDE SCH MG: 10 INJECTION, SOLUTION INTRAVENOUS at 22:03

## 2018-07-30 RX ADMIN — DOCUSATE SODIUM 50MG AND SENNOSIDES 8.6MG SCH TAB: 8.6; 5 TABLET, FILM COATED ORAL at 10:27

## 2018-07-30 RX ADMIN — METOPROLOL TARTRATE SCH MG: 25 TABLET, FILM COATED ORAL at 22:03

## 2018-07-30 RX ADMIN — POTASSIUM CHLORIDE SCH MEQ: 750 TABLET, FILM COATED, EXTENDED RELEASE ORAL at 10:27

## 2018-07-30 RX ADMIN — ENOXAPARIN SODIUM SCH MG: 40 INJECTION SUBCUTANEOUS at 10:28

## 2018-07-30 RX ADMIN — SODIUM CHLORIDE, PRESERVATIVE FREE SCH ML: 5 INJECTION INTRAVENOUS at 22:04

## 2018-07-30 RX ADMIN — BENAZEPRIL HYDROCHLORIDE SCH MG: 20 TABLET, COATED ORAL at 22:03

## 2018-07-30 RX ADMIN — GABAPENTIN SCH MG: 300 CAPSULE ORAL at 22:03

## 2018-07-30 RX ADMIN — POTASSIUM CHLORIDE SCH MEQ: 750 TABLET, FILM COATED, EXTENDED RELEASE ORAL at 22:02

## 2018-07-30 RX ADMIN — POLYETHYLENE GLYCOL 3350 SCH GM: 17 POWDER, FOR SOLUTION ORAL at 09:00

## 2018-07-30 RX ADMIN — ACETAMINOPHEN PRN MG: 325 TABLET, FILM COATED ORAL at 10:26

## 2018-07-30 RX ADMIN — CLOPIDOGREL SCH MG: 75 TABLET, FILM COATED ORAL at 10:27

## 2018-07-30 RX ADMIN — ASPIRIN 81 MG SCH MG: 81 TABLET ORAL at 22:03

## 2018-07-30 RX ADMIN — POLYETHYLENE GLYCOL 3350 SCH GM: 17 POWDER, FOR SOLUTION ORAL at 10:26

## 2018-07-30 RX ADMIN — SODIUM CHLORIDE, PRESERVATIVE FREE SCH ML: 5 INJECTION INTRAVENOUS at 10:26

## 2018-07-30 RX ADMIN — AMIODARONE HYDROCHLORIDE SCH MG: 200 TABLET ORAL at 17:43

## 2018-07-30 RX ADMIN — BENAZEPRIL HYDROCHLORIDE SCH MG: 20 TABLET, COATED ORAL at 10:27

## 2018-07-30 RX ADMIN — AMIODARONE HYDROCHLORIDE SCH MG: 200 TABLET ORAL at 10:28

## 2018-07-30 RX ADMIN — GABAPENTIN SCH MG: 300 CAPSULE ORAL at 17:42

## 2018-07-30 RX ADMIN — ATORVASTATIN CALCIUM SCH MG: 80 TABLET, FILM COATED ORAL at 22:02

## 2018-07-30 RX ADMIN — GABAPENTIN SCH MG: 300 CAPSULE ORAL at 10:28

## 2018-07-30 RX ADMIN — DOCUSATE SODIUM SCH MG: 100 CAPSULE, LIQUID FILLED ORAL at 10:28

## 2018-07-31 VITALS — DIASTOLIC BLOOD PRESSURE: 66 MMHG | SYSTOLIC BLOOD PRESSURE: 102 MMHG

## 2018-07-31 VITALS — DIASTOLIC BLOOD PRESSURE: 54 MMHG | SYSTOLIC BLOOD PRESSURE: 107 MMHG

## 2018-07-31 VITALS — DIASTOLIC BLOOD PRESSURE: 52 MMHG | SYSTOLIC BLOOD PRESSURE: 115 MMHG

## 2018-07-31 VITALS — DIASTOLIC BLOOD PRESSURE: 87 MMHG | SYSTOLIC BLOOD PRESSURE: 116 MMHG

## 2018-07-31 VITALS — SYSTOLIC BLOOD PRESSURE: 97 MMHG | DIASTOLIC BLOOD PRESSURE: 53 MMHG

## 2018-07-31 LAB
ANION GAP SERPL CALC-SCNC: 8 MMOL/L (ref 5–15)
CALCIUM SERPL-MCNC: 8.8 MG/DL (ref 8.5–10.1)
CHLORIDE SERPL-SCNC: 107 MMOL/L (ref 98–107)
CREAT SERPL-MCNC: 0.91 MG/DL (ref 0.7–1.3)

## 2018-07-31 RX ADMIN — METOPROLOL TARTRATE SCH MG: 25 TABLET, FILM COATED ORAL at 21:56

## 2018-07-31 RX ADMIN — SODIUM CHLORIDE, PRESERVATIVE FREE SCH ML: 5 INJECTION INTRAVENOUS at 08:59

## 2018-07-31 RX ADMIN — AMIODARONE HYDROCHLORIDE SCH MG: 200 TABLET ORAL at 08:59

## 2018-07-31 RX ADMIN — GABAPENTIN SCH MG: 300 CAPSULE ORAL at 16:21

## 2018-07-31 RX ADMIN — FUROSEMIDE SCH MG: 10 INJECTION, SOLUTION INTRAVENOUS at 08:59

## 2018-07-31 RX ADMIN — ATORVASTATIN CALCIUM SCH MG: 80 TABLET, FILM COATED ORAL at 20:32

## 2018-07-31 RX ADMIN — POTASSIUM CHLORIDE SCH MEQ: 750 TABLET, FILM COATED, EXTENDED RELEASE ORAL at 20:32

## 2018-07-31 RX ADMIN — GABAPENTIN SCH MG: 300 CAPSULE ORAL at 09:00

## 2018-07-31 RX ADMIN — DOCUSATE SODIUM 50MG AND SENNOSIDES 8.6MG SCH TAB: 8.6; 5 TABLET, FILM COATED ORAL at 07:24

## 2018-07-31 RX ADMIN — ENOXAPARIN SODIUM SCH MG: 40 INJECTION SUBCUTANEOUS at 08:59

## 2018-07-31 RX ADMIN — METOPROLOL TARTRATE SCH MG: 25 TABLET, FILM COATED ORAL at 09:00

## 2018-07-31 RX ADMIN — AMIODARONE HYDROCHLORIDE SCH MG: 200 TABLET ORAL at 16:21

## 2018-07-31 RX ADMIN — CLOPIDOGREL SCH MG: 75 TABLET, FILM COATED ORAL at 08:59

## 2018-07-31 RX ADMIN — FUROSEMIDE SCH MG: 10 INJECTION, SOLUTION INTRAVENOUS at 20:32

## 2018-07-31 RX ADMIN — ASPIRIN 81 MG SCH MG: 81 TABLET ORAL at 20:32

## 2018-07-31 RX ADMIN — ACETAMINOPHEN PRN MG: 325 TABLET, FILM COATED ORAL at 09:00

## 2018-07-31 RX ADMIN — BENAZEPRIL HYDROCHLORIDE SCH MG: 20 TABLET, COATED ORAL at 09:03

## 2018-07-31 RX ADMIN — SODIUM CHLORIDE, PRESERVATIVE FREE SCH ML: 5 INJECTION INTRAVENOUS at 20:35

## 2018-07-31 RX ADMIN — AMIODARONE HYDROCHLORIDE SCH MG: 200 TABLET ORAL at 20:30

## 2018-07-31 RX ADMIN — POTASSIUM CHLORIDE SCH MEQ: 750 TABLET, FILM COATED, EXTENDED RELEASE ORAL at 08:59

## 2018-07-31 RX ADMIN — GABAPENTIN SCH MG: 300 CAPSULE ORAL at 20:32

## 2018-07-31 RX ADMIN — POLYETHYLENE GLYCOL 3350 SCH GM: 17 POWDER, FOR SOLUTION ORAL at 07:24

## 2018-07-31 RX ADMIN — BENAZEPRIL HYDROCHLORIDE SCH MG: 20 TABLET, COATED ORAL at 20:31

## 2018-07-31 RX ADMIN — ALLOPURINOL SCH MG: 300 TABLET ORAL at 09:00

## 2018-08-01 VITALS — SYSTOLIC BLOOD PRESSURE: 115 MMHG | DIASTOLIC BLOOD PRESSURE: 58 MMHG

## 2018-08-01 VITALS — SYSTOLIC BLOOD PRESSURE: 106 MMHG | DIASTOLIC BLOOD PRESSURE: 63 MMHG

## 2018-08-01 VITALS — DIASTOLIC BLOOD PRESSURE: 55 MMHG | SYSTOLIC BLOOD PRESSURE: 95 MMHG

## 2018-08-01 VITALS — SYSTOLIC BLOOD PRESSURE: 120 MMHG | DIASTOLIC BLOOD PRESSURE: 64 MMHG

## 2018-08-01 LAB
ANION GAP SERPL CALC-SCNC: 9 MMOL/L (ref 5–15)
CALCIUM SERPL-MCNC: 9.1 MG/DL (ref 8.5–10.1)
CHLORIDE SERPL-SCNC: 107 MMOL/L (ref 98–107)
CREAT SERPL-MCNC: 1.03 MG/DL (ref 0.7–1.3)

## 2018-08-01 RX ADMIN — AMIODARONE HYDROCHLORIDE SCH MG: 200 TABLET ORAL at 08:50

## 2018-08-01 RX ADMIN — ATORVASTATIN CALCIUM SCH MG: 80 TABLET, FILM COATED ORAL at 08:49

## 2018-08-01 RX ADMIN — DOCUSATE SODIUM 50MG AND SENNOSIDES 8.6MG SCH TAB: 8.6; 5 TABLET, FILM COATED ORAL at 08:51

## 2018-08-01 RX ADMIN — ALLOPURINOL SCH MG: 300 TABLET ORAL at 08:49

## 2018-08-01 RX ADMIN — ENOXAPARIN SODIUM SCH MG: 40 INJECTION SUBCUTANEOUS at 08:51

## 2018-08-01 RX ADMIN — METOPROLOL TARTRATE SCH MG: 25 TABLET, FILM COATED ORAL at 08:50

## 2018-08-01 RX ADMIN — CLOPIDOGREL SCH MG: 75 TABLET, FILM COATED ORAL at 08:49

## 2018-08-01 RX ADMIN — GABAPENTIN SCH MG: 300 CAPSULE ORAL at 08:49

## 2018-08-01 RX ADMIN — BENAZEPRIL HYDROCHLORIDE SCH MG: 20 TABLET, COATED ORAL at 08:50

## 2018-08-01 RX ADMIN — POTASSIUM CHLORIDE SCH MEQ: 750 TABLET, FILM COATED, EXTENDED RELEASE ORAL at 08:50

## 2018-08-01 RX ADMIN — FUROSEMIDE SCH MG: 10 INJECTION, SOLUTION INTRAVENOUS at 08:48

## 2018-09-08 ENCOUNTER — HOSPITAL ENCOUNTER (INPATIENT)
Dept: HOSPITAL 8 - ED | Age: 78
LOS: 2 days | Discharge: HOME HEALTH | DRG: 871 | End: 2018-09-10
Attending: FAMILY MEDICINE | Admitting: FAMILY MEDICINE
Payer: MEDICARE

## 2018-09-08 VITALS — SYSTOLIC BLOOD PRESSURE: 91 MMHG | DIASTOLIC BLOOD PRESSURE: 42 MMHG

## 2018-09-08 VITALS — BODY MASS INDEX: 25.92 KG/M2 | WEIGHT: 191.36 LBS | HEIGHT: 72 IN

## 2018-09-08 VITALS — DIASTOLIC BLOOD PRESSURE: 58 MMHG | SYSTOLIC BLOOD PRESSURE: 99 MMHG

## 2018-09-08 VITALS — DIASTOLIC BLOOD PRESSURE: 60 MMHG | SYSTOLIC BLOOD PRESSURE: 121 MMHG

## 2018-09-08 DIAGNOSIS — G62.9: ICD-10-CM

## 2018-09-08 DIAGNOSIS — Z83.3: ICD-10-CM

## 2018-09-08 DIAGNOSIS — E46: ICD-10-CM

## 2018-09-08 DIAGNOSIS — Z80.8: ICD-10-CM

## 2018-09-08 DIAGNOSIS — I25.10: ICD-10-CM

## 2018-09-08 DIAGNOSIS — E86.0: ICD-10-CM

## 2018-09-08 DIAGNOSIS — Z87.891: ICD-10-CM

## 2018-09-08 DIAGNOSIS — Z95.1: ICD-10-CM

## 2018-09-08 DIAGNOSIS — Z96.649: ICD-10-CM

## 2018-09-08 DIAGNOSIS — A41.9: Primary | ICD-10-CM

## 2018-09-08 DIAGNOSIS — I25.2: ICD-10-CM

## 2018-09-08 DIAGNOSIS — I10: ICD-10-CM

## 2018-09-08 DIAGNOSIS — Z96.659: ICD-10-CM

## 2018-09-08 DIAGNOSIS — I48.91: ICD-10-CM

## 2018-09-08 DIAGNOSIS — N39.0: ICD-10-CM

## 2018-09-08 DIAGNOSIS — I44.7: ICD-10-CM

## 2018-09-08 DIAGNOSIS — Z82.49: ICD-10-CM

## 2018-09-08 DIAGNOSIS — R33.9: ICD-10-CM

## 2018-09-08 DIAGNOSIS — M10.9: ICD-10-CM

## 2018-09-08 DIAGNOSIS — N17.0: ICD-10-CM

## 2018-09-08 LAB
ALBUMIN SERPL-MCNC: 2.7 G/DL (ref 3.4–5)
ALP SERPL-CCNC: 89 U/L (ref 45–117)
ALT SERPL-CCNC: 37 U/L (ref 12–78)
ANION GAP SERPL CALC-SCNC: 8 MMOL/L (ref 5–15)
BASOPHILS # BLD AUTO: 0.01 X10^3/UL (ref 0–0.1)
BASOPHILS NFR BLD AUTO: 0 % (ref 0–1)
BILIRUB SERPL-MCNC: 1.2 MG/DL (ref 0.2–1)
CALCIUM SERPL-MCNC: 8.6 MG/DL (ref 8.5–10.1)
CHLORIDE SERPL-SCNC: 111 MMOL/L (ref 98–107)
CREAT SERPL-MCNC: 1.17 MG/DL (ref 0.7–1.3)
CULTURE INDICATED?: YES
EOSINOPHIL # BLD AUTO: 0.03 X10^3/UL (ref 0–0.4)
EOSINOPHIL NFR BLD AUTO: 1 % (ref 1–7)
ERYTHROCYTE [DISTWIDTH] IN BLOOD BY AUTOMATED COUNT: 14.6 % (ref 9.4–14.8)
LYMPHOCYTES # BLD AUTO: 0.35 X10^3/UL (ref 1–3.4)
LYMPHOCYTES NFR BLD AUTO: 5 % (ref 22–44)
MCH RBC QN AUTO: 33.1 PG (ref 27.5–34.5)
MCHC RBC AUTO-ENTMCNC: 33.6 G/DL (ref 33.2–36.2)
MCV RBC AUTO: 98.5 FL (ref 81–97)
MD: NO
MICROSCOPIC: (no result)
MONOCYTES # BLD AUTO: 0.56 X10^3/UL (ref 0.2–0.8)
MONOCYTES NFR BLD AUTO: 8 % (ref 2–9)
NEUTROPHILS # BLD AUTO: 6.4 X10^3/UL (ref 1.8–6.8)
NEUTROPHILS NFR BLD AUTO: 87 % (ref 42–75)
PLATELET # BLD AUTO: 165 X10^3/UL (ref 130–400)
PMV BLD AUTO: 7.9 FL (ref 7.4–10.4)
PROT SERPL-MCNC: 6.2 G/DL (ref 6.4–8.2)
RBC # BLD AUTO: 3.36 X10^6/UL (ref 4.38–5.82)
TROPONIN I SERPL-MCNC: 0.23 NG/ML (ref 0–0.04)
TROPONIN I SERPL-MCNC: 0.5 NG/ML (ref 0–0.04)
TROPONIN I SERPL-MCNC: 0.72 NG/ML (ref 0–0.04)

## 2018-09-08 PROCEDURE — 87077 CULTURE AEROBIC IDENTIFY: CPT

## 2018-09-08 PROCEDURE — 71045 X-RAY EXAM CHEST 1 VIEW: CPT

## 2018-09-08 PROCEDURE — 96361 HYDRATE IV INFUSION ADD-ON: CPT

## 2018-09-08 PROCEDURE — 93017 CV STRESS TEST TRACING ONLY: CPT

## 2018-09-08 PROCEDURE — 87086 URINE CULTURE/COLONY COUNT: CPT

## 2018-09-08 PROCEDURE — 99285 EMERGENCY DEPT VISIT HI MDM: CPT

## 2018-09-08 PROCEDURE — 83605 ASSAY OF LACTIC ACID: CPT

## 2018-09-08 PROCEDURE — 87186 SC STD MICRODIL/AGAR DIL: CPT

## 2018-09-08 PROCEDURE — 80048 BASIC METABOLIC PNL TOTAL CA: CPT

## 2018-09-08 PROCEDURE — 80053 COMPREHEN METABOLIC PANEL: CPT

## 2018-09-08 PROCEDURE — 85025 COMPLETE CBC W/AUTO DIFF WBC: CPT

## 2018-09-08 PROCEDURE — 78452 HT MUSCLE IMAGE SPECT MULT: CPT

## 2018-09-08 PROCEDURE — 84484 ASSAY OF TROPONIN QUANT: CPT

## 2018-09-08 PROCEDURE — A9502 TC99M TETROFOSMIN: HCPCS

## 2018-09-08 PROCEDURE — C9898 INPNT STAY RADIOLABELED ITEM: HCPCS

## 2018-09-08 PROCEDURE — 87040 BLOOD CULTURE FOR BACTERIA: CPT

## 2018-09-08 PROCEDURE — 96365 THER/PROPH/DIAG IV INF INIT: CPT

## 2018-09-08 PROCEDURE — 36415 COLL VENOUS BLD VENIPUNCTURE: CPT

## 2018-09-08 PROCEDURE — 84145 PROCALCITONIN (PCT): CPT

## 2018-09-08 PROCEDURE — 93005 ELECTROCARDIOGRAM TRACING: CPT

## 2018-09-08 PROCEDURE — 81001 URINALYSIS AUTO W/SCOPE: CPT

## 2018-09-08 RX ADMIN — ALLOPURINOL SCH MG: 300 TABLET ORAL at 10:01

## 2018-09-08 RX ADMIN — VITAMIN D, TAB 1000IU (100/BT) SCH UNITS: 25 TAB at 10:01

## 2018-09-08 RX ADMIN — AMIODARONE HYDROCHLORIDE SCH MG: 200 TABLET ORAL at 22:18

## 2018-09-08 RX ADMIN — FUROSEMIDE SCH MG: 20 TABLET ORAL at 09:59

## 2018-09-08 RX ADMIN — ENOXAPARIN SODIUM SCH MG: 40 INJECTION SUBCUTANEOUS at 09:58

## 2018-09-08 RX ADMIN — GABAPENTIN SCH MG: 300 CAPSULE ORAL at 10:00

## 2018-09-08 RX ADMIN — BENAZEPRIL HYDROCHLORIDE SCH MG: 20 TABLET, COATED ORAL at 10:01

## 2018-09-08 RX ADMIN — CLOPIDOGREL SCH MG: 75 TABLET, FILM COATED ORAL at 10:01

## 2018-09-08 RX ADMIN — POTASSIUM CHLORIDE SCH MLS/HR: 2 INJECTION, SOLUTION, CONCENTRATE INTRAVENOUS at 22:20

## 2018-09-08 RX ADMIN — ASPIRIN SCH MG: 81 TABLET, COATED ORAL at 20:47

## 2018-09-08 RX ADMIN — GABAPENTIN SCH MG: 300 CAPSULE ORAL at 20:47

## 2018-09-08 RX ADMIN — METOPROLOL TARTRATE SCH MG: 25 TABLET, FILM COATED ORAL at 10:00

## 2018-09-08 RX ADMIN — ATORVASTATIN CALCIUM SCH MG: 80 TABLET, FILM COATED ORAL at 09:59

## 2018-09-08 RX ADMIN — METOPROLOL TARTRATE SCH MG: 25 TABLET, FILM COATED ORAL at 21:00

## 2018-09-08 RX ADMIN — CEFTRIAXONE SCH MLS/HR: 1 INJECTION, POWDER, FOR SOLUTION INTRAMUSCULAR; INTRAVENOUS at 18:26

## 2018-09-08 RX ADMIN — AMIODARONE HYDROCHLORIDE SCH MG: 200 TABLET ORAL at 09:59

## 2018-09-08 RX ADMIN — GABAPENTIN SCH MG: 300 CAPSULE ORAL at 15:53

## 2018-09-09 VITALS — DIASTOLIC BLOOD PRESSURE: 57 MMHG | SYSTOLIC BLOOD PRESSURE: 108 MMHG

## 2018-09-09 VITALS — DIASTOLIC BLOOD PRESSURE: 67 MMHG | SYSTOLIC BLOOD PRESSURE: 153 MMHG

## 2018-09-09 VITALS — DIASTOLIC BLOOD PRESSURE: 62 MMHG | SYSTOLIC BLOOD PRESSURE: 148 MMHG

## 2018-09-09 VITALS — DIASTOLIC BLOOD PRESSURE: 63 MMHG | SYSTOLIC BLOOD PRESSURE: 144 MMHG

## 2018-09-09 LAB
ANION GAP SERPL CALC-SCNC: 7 MMOL/L (ref 5–15)
BASOPHILS # BLD AUTO: 0.02 X10^3/UL (ref 0–0.1)
BASOPHILS NFR BLD AUTO: 0 % (ref 0–1)
CALCIUM SERPL-MCNC: 8.3 MG/DL (ref 8.5–10.1)
CHLORIDE SERPL-SCNC: 114 MMOL/L (ref 98–107)
CREAT SERPL-MCNC: 1.08 MG/DL (ref 0.7–1.3)
EOSINOPHIL # BLD AUTO: 0.06 X10^3/UL (ref 0–0.4)
EOSINOPHIL NFR BLD AUTO: 1 % (ref 1–7)
ERYTHROCYTE [DISTWIDTH] IN BLOOD BY AUTOMATED COUNT: 14.6 % (ref 9.4–14.8)
LYMPHOCYTES # BLD AUTO: 0.66 X10^3/UL (ref 1–3.4)
LYMPHOCYTES NFR BLD AUTO: 13 % (ref 22–44)
MCH RBC QN AUTO: 33.3 PG (ref 27.5–34.5)
MCHC RBC AUTO-ENTMCNC: 33.7 G/DL (ref 33.2–36.2)
MCV RBC AUTO: 98.8 FL (ref 81–97)
MD: NO
MONOCYTES # BLD AUTO: 0.4 X10^3/UL (ref 0.2–0.8)
MONOCYTES NFR BLD AUTO: 8 % (ref 2–9)
NEUTROPHILS # BLD AUTO: 3.84 X10^3/UL (ref 1.8–6.8)
NEUTROPHILS NFR BLD AUTO: 77 % (ref 42–75)
PLATELET # BLD AUTO: 100 X10^3/UL (ref 130–400)
PMV BLD AUTO: 8 FL (ref 7.4–10.4)
RBC # BLD AUTO: 3.03 X10^6/UL (ref 4.38–5.82)
TROPONIN I SERPL-MCNC: 0.54 NG/ML (ref 0–0.04)

## 2018-09-09 RX ADMIN — GABAPENTIN SCH MG: 300 CAPSULE ORAL at 21:20

## 2018-09-09 RX ADMIN — CLOPIDOGREL SCH MG: 75 TABLET, FILM COATED ORAL at 10:12

## 2018-09-09 RX ADMIN — AMIODARONE HYDROCHLORIDE SCH MG: 200 TABLET ORAL at 10:12

## 2018-09-09 RX ADMIN — FUROSEMIDE SCH MG: 20 TABLET ORAL at 09:00

## 2018-09-09 RX ADMIN — ASPIRIN SCH MG: 81 TABLET, COATED ORAL at 21:20

## 2018-09-09 RX ADMIN — ENOXAPARIN SODIUM SCH MG: 40 INJECTION SUBCUTANEOUS at 10:13

## 2018-09-09 RX ADMIN — METOPROLOL TARTRATE SCH MG: 25 TABLET, FILM COATED ORAL at 21:20

## 2018-09-09 RX ADMIN — CEFTRIAXONE SCH MLS/HR: 1 INJECTION, POWDER, FOR SOLUTION INTRAMUSCULAR; INTRAVENOUS at 18:04

## 2018-09-09 RX ADMIN — GABAPENTIN SCH MG: 300 CAPSULE ORAL at 10:12

## 2018-09-09 RX ADMIN — VITAMIN D, TAB 1000IU (100/BT) SCH UNITS: 25 TAB at 10:12

## 2018-09-09 RX ADMIN — POTASSIUM CHLORIDE SCH MLS/HR: 2 INJECTION, SOLUTION, CONCENTRATE INTRAVENOUS at 08:30

## 2018-09-09 RX ADMIN — ATORVASTATIN CALCIUM SCH MG: 80 TABLET, FILM COATED ORAL at 10:12

## 2018-09-09 RX ADMIN — SODIUM CHLORIDE, SODIUM LACTATE, POTASSIUM CHLORIDE, AND CALCIUM CHLORIDE SCH MLS/HR: .6; .31; .03; .02 INJECTION, SOLUTION INTRAVENOUS at 13:15

## 2018-09-09 RX ADMIN — CEFTRIAXONE SCH MLS/HR: 1 INJECTION, POWDER, FOR SOLUTION INTRAMUSCULAR; INTRAVENOUS at 06:36

## 2018-09-09 RX ADMIN — ALLOPURINOL SCH MG: 300 TABLET ORAL at 10:12

## 2018-09-09 RX ADMIN — SODIUM CHLORIDE, SODIUM LACTATE, POTASSIUM CHLORIDE, AND CALCIUM CHLORIDE SCH MLS/HR: .6; .31; .03; .02 INJECTION, SOLUTION INTRAVENOUS at 23:49

## 2018-09-09 RX ADMIN — GABAPENTIN SCH MG: 300 CAPSULE ORAL at 16:42

## 2018-09-09 RX ADMIN — AMIODARONE HYDROCHLORIDE SCH MG: 200 TABLET ORAL at 21:19

## 2018-09-09 RX ADMIN — BENAZEPRIL HYDROCHLORIDE SCH MG: 20 TABLET, COATED ORAL at 10:12

## 2018-09-09 RX ADMIN — METOPROLOL TARTRATE SCH MG: 25 TABLET, FILM COATED ORAL at 09:00

## 2018-09-10 VITALS — DIASTOLIC BLOOD PRESSURE: 56 MMHG | SYSTOLIC BLOOD PRESSURE: 127 MMHG

## 2018-09-10 VITALS — SYSTOLIC BLOOD PRESSURE: 162 MMHG | DIASTOLIC BLOOD PRESSURE: 72 MMHG

## 2018-09-10 VITALS — DIASTOLIC BLOOD PRESSURE: 70 MMHG | SYSTOLIC BLOOD PRESSURE: 148 MMHG

## 2018-09-10 LAB
ANION GAP SERPL CALC-SCNC: 8 MMOL/L (ref 5–15)
BASOPHILS # BLD AUTO: 0.02 X10^3/UL (ref 0–0.1)
BASOPHILS NFR BLD AUTO: 0 % (ref 0–1)
CALCIUM SERPL-MCNC: 8.6 MG/DL (ref 8.5–10.1)
CHLORIDE SERPL-SCNC: 110 MMOL/L (ref 98–107)
CREAT SERPL-MCNC: 0.89 MG/DL (ref 0.7–1.3)
EOSINOPHIL # BLD AUTO: 0.08 X10^3/UL (ref 0–0.4)
EOSINOPHIL NFR BLD AUTO: 1 % (ref 1–7)
ERYTHROCYTE [DISTWIDTH] IN BLOOD BY AUTOMATED COUNT: 15 % (ref 9.4–14.8)
LYMPHOCYTES # BLD AUTO: 0.7 X10^3/UL (ref 1–3.4)
LYMPHOCYTES NFR BLD AUTO: 11 % (ref 22–44)
MCH RBC QN AUTO: 33.8 PG (ref 27.5–34.5)
MCHC RBC AUTO-ENTMCNC: 34.1 G/DL (ref 33.2–36.2)
MCV RBC AUTO: 99.1 FL (ref 81–97)
MD: NO
MONOCYTES # BLD AUTO: 0.47 X10^3/UL (ref 0.2–0.8)
MONOCYTES NFR BLD AUTO: 7 % (ref 2–9)
NEUTROPHILS # BLD AUTO: 5.19 X10^3/UL (ref 1.8–6.8)
NEUTROPHILS NFR BLD AUTO: 80 % (ref 42–75)
PLATELET # BLD AUTO: 126 X10^3/UL (ref 130–400)
PMV BLD AUTO: 8.3 FL (ref 7.4–10.4)
RBC # BLD AUTO: 3.21 X10^6/UL (ref 4.38–5.82)

## 2018-09-10 RX ADMIN — GABAPENTIN SCH MG: 300 CAPSULE ORAL at 09:29

## 2018-09-10 RX ADMIN — SODIUM CHLORIDE, SODIUM LACTATE, POTASSIUM CHLORIDE, AND CALCIUM CHLORIDE SCH MLS/HR: .6; .31; .03; .02 INJECTION, SOLUTION INTRAVENOUS at 09:28

## 2018-09-10 RX ADMIN — AMIODARONE HYDROCHLORIDE SCH MG: 200 TABLET ORAL at 09:29

## 2018-09-10 RX ADMIN — BENAZEPRIL HYDROCHLORIDE SCH MG: 20 TABLET, COATED ORAL at 09:30

## 2018-09-10 RX ADMIN — CLOPIDOGREL SCH MG: 75 TABLET, FILM COATED ORAL at 09:31

## 2018-09-10 RX ADMIN — METOPROLOL TARTRATE SCH MG: 25 TABLET, FILM COATED ORAL at 09:30

## 2018-09-10 RX ADMIN — VITAMIN D, TAB 1000IU (100/BT) SCH UNITS: 25 TAB at 09:29

## 2018-09-10 RX ADMIN — ALLOPURINOL SCH MG: 300 TABLET ORAL at 09:29

## 2018-09-10 RX ADMIN — FUROSEMIDE SCH MG: 20 TABLET ORAL at 09:30

## 2018-09-10 RX ADMIN — ATORVASTATIN CALCIUM SCH MG: 80 TABLET, FILM COATED ORAL at 09:30

## 2018-09-10 RX ADMIN — CEFTRIAXONE SCH MLS/HR: 1 INJECTION, POWDER, FOR SOLUTION INTRAMUSCULAR; INTRAVENOUS at 05:01

## 2020-11-03 ENCOUNTER — HOSPITAL ENCOUNTER (OUTPATIENT)
Dept: HOSPITAL 8 - CVU | Age: 80
Discharge: HOME | End: 2020-11-03
Attending: INTERNAL MEDICINE
Payer: MEDICARE

## 2020-11-03 DIAGNOSIS — I10: ICD-10-CM

## 2020-11-03 DIAGNOSIS — I08.0: Primary | ICD-10-CM

## 2020-11-03 DIAGNOSIS — I65.21: ICD-10-CM

## 2020-11-03 PROCEDURE — 93880 EXTRACRANIAL BILAT STUDY: CPT

## 2020-11-03 PROCEDURE — 93306 TTE W/DOPPLER COMPLETE: CPT

## 2021-01-14 DIAGNOSIS — Z23 NEED FOR VACCINATION: ICD-10-CM

## 2023-01-01 ENCOUNTER — HOSPITAL ENCOUNTER (INPATIENT)
Facility: MEDICAL CENTER | Age: 83
LOS: 12 days | DRG: 682 | End: 2023-05-07
Attending: EMERGENCY MEDICINE | Admitting: FAMILY MEDICINE
Payer: MEDICARE

## 2023-01-01 ENCOUNTER — APPOINTMENT (OUTPATIENT)
Dept: RADIOLOGY | Facility: MEDICAL CENTER | Age: 83
DRG: 682 | End: 2023-01-01
Attending: HOSPITALIST
Payer: MEDICARE

## 2023-01-01 ENCOUNTER — APPOINTMENT (OUTPATIENT)
Dept: RADIOLOGY | Facility: MEDICAL CENTER | Age: 83
DRG: 682 | End: 2023-01-01
Attending: EMERGENCY MEDICINE
Payer: MEDICARE

## 2023-01-01 ENCOUNTER — APPOINTMENT (OUTPATIENT)
Dept: RADIOLOGY | Facility: MEDICAL CENTER | Age: 83
DRG: 682 | End: 2023-01-01
Payer: MEDICARE

## 2023-01-01 ENCOUNTER — APPOINTMENT (OUTPATIENT)
Dept: CARDIOLOGY | Facility: MEDICAL CENTER | Age: 83
DRG: 682 | End: 2023-01-01
Attending: HOSPITALIST
Payer: MEDICARE

## 2023-01-01 VITALS
BODY MASS INDEX: 24.99 KG/M2 | RESPIRATION RATE: 16 BRPM | WEIGHT: 184.53 LBS | SYSTOLIC BLOOD PRESSURE: 114 MMHG | HEIGHT: 72 IN | TEMPERATURE: 97.5 F | HEART RATE: 85 BPM | DIASTOLIC BLOOD PRESSURE: 43 MMHG | OXYGEN SATURATION: 85 %

## 2023-01-01 DIAGNOSIS — R41.0 DELIRIUM: ICD-10-CM

## 2023-01-01 DIAGNOSIS — N17.9 ACUTE KIDNEY INJURY (HCC): ICD-10-CM

## 2023-01-01 DIAGNOSIS — S73.005A DISLOCATION OF LEFT HIP, INITIAL ENCOUNTER (HCC): ICD-10-CM

## 2023-01-01 DIAGNOSIS — N17.9 AKI (ACUTE KIDNEY INJURY) (HCC): ICD-10-CM

## 2023-01-01 DIAGNOSIS — I51.89 LEFT VENTRICULAR DIASTOLIC DYSFUNCTION, NYHA CLASS 2: ICD-10-CM

## 2023-01-01 LAB
ALBUMIN SERPL BCP-MCNC: 3 G/DL (ref 3.2–4.9)
ALBUMIN SERPL BCP-MCNC: 3.2 G/DL (ref 3.2–4.9)
ALBUMIN SERPL BCP-MCNC: 3.3 G/DL (ref 3.2–4.9)
ALBUMIN SERPL BCP-MCNC: 3.3 G/DL (ref 3.2–4.9)
ALBUMIN SERPL BCP-MCNC: 3.6 G/DL (ref 3.2–4.9)
ALBUMIN/GLOB SERPL: 1.4 G/DL
ALBUMIN/GLOB SERPL: 1.4 G/DL
ALBUMIN/GLOB SERPL: 1.5 G/DL
ALBUMIN/GLOB SERPL: 1.6 G/DL
ALP SERPL-CCNC: 82 U/L (ref 30–99)
ALP SERPL-CCNC: 87 U/L (ref 30–99)
ALP SERPL-CCNC: 88 U/L (ref 30–99)
ALP SERPL-CCNC: 89 U/L (ref 30–99)
ALP SERPL-CCNC: 92 U/L (ref 30–99)
ALT SERPL-CCNC: 16 U/L (ref 2–50)
ALT SERPL-CCNC: 19 U/L (ref 2–50)
ALT SERPL-CCNC: 20 U/L (ref 2–50)
ALT SERPL-CCNC: 21 U/L (ref 2–50)
ALT SERPL-CCNC: 28 U/L (ref 2–50)
AMMONIA PLAS-SCNC: 11 UMOL/L (ref 11–45)
ANION GAP SERPL CALC-SCNC: 11 MMOL/L (ref 7–16)
ANION GAP SERPL CALC-SCNC: 12 MMOL/L (ref 7–16)
ANION GAP SERPL CALC-SCNC: 12 MMOL/L (ref 7–16)
ANION GAP SERPL CALC-SCNC: 13 MMOL/L (ref 7–16)
ANION GAP SERPL CALC-SCNC: 14 MMOL/L (ref 7–16)
ANION GAP SERPL CALC-SCNC: 16 MMOL/L (ref 7–16)
ANION GAP SERPL CALC-SCNC: 9 MMOL/L (ref 7–16)
ANION GAP SERPL CALC-SCNC: 9 MMOL/L (ref 7–16)
APPEARANCE UR: CLEAR
APPEARANCE UR: CLEAR
APTT PPP: 24.8 SEC (ref 24.7–36)
AST SERPL-CCNC: 13 U/L (ref 12–45)
AST SERPL-CCNC: 15 U/L (ref 12–45)
AST SERPL-CCNC: 15 U/L (ref 12–45)
AST SERPL-CCNC: 20 U/L (ref 12–45)
AST SERPL-CCNC: 25 U/L (ref 12–45)
BACTERIA #/AREA URNS HPF: NEGATIVE /HPF
BACTERIA #/AREA URNS HPF: NEGATIVE /HPF
BASOPHILS # BLD AUTO: 0.2 % (ref 0–1.8)
BASOPHILS # BLD: 0.01 K/UL (ref 0–0.12)
BILIRUB CONJ SERPL-MCNC: 0.2 MG/DL (ref 0.1–0.5)
BILIRUB INDIRECT SERPL-MCNC: 0.4 MG/DL (ref 0–1)
BILIRUB SERPL-MCNC: 0.3 MG/DL (ref 0.1–1.5)
BILIRUB SERPL-MCNC: 0.5 MG/DL (ref 0.1–1.5)
BILIRUB SERPL-MCNC: 0.6 MG/DL (ref 0.1–1.5)
BILIRUB SERPL-MCNC: 0.9 MG/DL (ref 0.1–1.5)
BILIRUB SERPL-MCNC: 1.3 MG/DL (ref 0.1–1.5)
BILIRUB UR QL STRIP.AUTO: NEGATIVE
BILIRUB UR QL STRIP.AUTO: NEGATIVE
BUN SERPL-MCNC: 20 MG/DL (ref 8–22)
BUN SERPL-MCNC: 24 MG/DL (ref 8–22)
BUN SERPL-MCNC: 27 MG/DL (ref 8–22)
BUN SERPL-MCNC: 34 MG/DL (ref 8–22)
BUN SERPL-MCNC: 38 MG/DL (ref 8–22)
BUN SERPL-MCNC: 41 MG/DL (ref 8–22)
BUN SERPL-MCNC: 44 MG/DL (ref 8–22)
BUN SERPL-MCNC: 45 MG/DL (ref 8–22)
BUN SERPL-MCNC: 48 MG/DL (ref 8–22)
BUN SERPL-MCNC: 53 MG/DL (ref 8–22)
BUN SERPL-MCNC: 54 MG/DL (ref 8–22)
CALCIUM ALBUM COR SERPL-MCNC: 8.7 MG/DL (ref 8.5–10.5)
CALCIUM ALBUM COR SERPL-MCNC: 8.9 MG/DL (ref 8.5–10.5)
CALCIUM ALBUM COR SERPL-MCNC: 9.2 MG/DL (ref 8.5–10.5)
CALCIUM ALBUM COR SERPL-MCNC: 9.9 MG/DL (ref 8.5–10.5)
CALCIUM SERPL-MCNC: 7.9 MG/DL (ref 8.5–10.5)
CALCIUM SERPL-MCNC: 8 MG/DL (ref 8.5–10.5)
CALCIUM SERPL-MCNC: 8.3 MG/DL (ref 8.5–10.5)
CALCIUM SERPL-MCNC: 8.4 MG/DL (ref 8.5–10.5)
CALCIUM SERPL-MCNC: 8.6 MG/DL (ref 8.5–10.5)
CALCIUM SERPL-MCNC: 8.9 MG/DL (ref 8.5–10.5)
CALCIUM SERPL-MCNC: 9.4 MG/DL (ref 8.5–10.5)
CALCIUM SERPL-MCNC: 9.6 MG/DL (ref 8.5–10.5)
CALCIUM SERPL-MCNC: 9.7 MG/DL (ref 8.5–10.5)
CALCIUM SERPL-MCNC: 9.7 MG/DL (ref 8.5–10.5)
CALCIUM SERPL-MCNC: 9.9 MG/DL (ref 8.5–10.5)
CHLORIDE SERPL-SCNC: 100 MMOL/L (ref 96–112)
CHLORIDE SERPL-SCNC: 101 MMOL/L (ref 96–112)
CHLORIDE SERPL-SCNC: 105 MMOL/L (ref 96–112)
CHLORIDE SERPL-SCNC: 107 MMOL/L (ref 96–112)
CHLORIDE SERPL-SCNC: 110 MMOL/L (ref 96–112)
CHLORIDE SERPL-SCNC: 110 MMOL/L (ref 96–112)
CHLORIDE SERPL-SCNC: 111 MMOL/L (ref 96–112)
CHLORIDE SERPL-SCNC: 112 MMOL/L (ref 96–112)
CHLORIDE SERPL-SCNC: 112 MMOL/L (ref 96–112)
CHLORIDE SERPL-SCNC: 96 MMOL/L (ref 96–112)
CHLORIDE SERPL-SCNC: 98 MMOL/L (ref 96–112)
CO2 SERPL-SCNC: 18 MMOL/L (ref 20–33)
CO2 SERPL-SCNC: 20 MMOL/L (ref 20–33)
CO2 SERPL-SCNC: 22 MMOL/L (ref 20–33)
CO2 SERPL-SCNC: 22 MMOL/L (ref 20–33)
CO2 SERPL-SCNC: 23 MMOL/L (ref 20–33)
CO2 SERPL-SCNC: 24 MMOL/L (ref 20–33)
CO2 SERPL-SCNC: 27 MMOL/L (ref 20–33)
CO2 SERPL-SCNC: 29 MMOL/L (ref 20–33)
CO2 SERPL-SCNC: 29 MMOL/L (ref 20–33)
CO2 SERPL-SCNC: 30 MMOL/L (ref 20–33)
CO2 SERPL-SCNC: 32 MMOL/L (ref 20–33)
COLOR UR: YELLOW
COLOR UR: YELLOW
CREAT SERPL-MCNC: 1.17 MG/DL (ref 0.5–1.4)
CREAT SERPL-MCNC: 1.19 MG/DL (ref 0.5–1.4)
CREAT SERPL-MCNC: 1.22 MG/DL (ref 0.5–1.4)
CREAT SERPL-MCNC: 1.32 MG/DL (ref 0.5–1.4)
CREAT SERPL-MCNC: 1.39 MG/DL (ref 0.5–1.4)
CREAT SERPL-MCNC: 1.5 MG/DL (ref 0.5–1.4)
CREAT SERPL-MCNC: 1.69 MG/DL (ref 0.5–1.4)
CREAT SERPL-MCNC: 1.7 MG/DL (ref 0.5–1.4)
CREAT SERPL-MCNC: 1.9 MG/DL (ref 0.5–1.4)
CREAT SERPL-MCNC: 2.22 MG/DL (ref 0.5–1.4)
CREAT SERPL-MCNC: 2.82 MG/DL (ref 0.5–1.4)
EKG IMPRESSION: NORMAL
EKG IMPRESSION: NORMAL
EOSINOPHIL # BLD AUTO: 0.21 K/UL (ref 0–0.51)
EOSINOPHIL NFR BLD: 3.2 % (ref 0–6.9)
EPI CELLS #/AREA URNS HPF: NEGATIVE /HPF
EPI CELLS #/AREA URNS HPF: NEGATIVE /HPF
ERYTHROCYTE [DISTWIDTH] IN BLOOD BY AUTOMATED COUNT: 45 FL (ref 35.9–50)
ERYTHROCYTE [DISTWIDTH] IN BLOOD BY AUTOMATED COUNT: 46.2 FL (ref 35.9–50)
ERYTHROCYTE [DISTWIDTH] IN BLOOD BY AUTOMATED COUNT: 47.8 FL (ref 35.9–50)
ERYTHROCYTE [DISTWIDTH] IN BLOOD BY AUTOMATED COUNT: 47.8 FL (ref 35.9–50)
ERYTHROCYTE [DISTWIDTH] IN BLOOD BY AUTOMATED COUNT: 48.1 FL (ref 35.9–50)
ERYTHROCYTE [DISTWIDTH] IN BLOOD BY AUTOMATED COUNT: 48.2 FL (ref 35.9–50)
ERYTHROCYTE [DISTWIDTH] IN BLOOD BY AUTOMATED COUNT: 49.2 FL (ref 35.9–50)
ERYTHROCYTE [DISTWIDTH] IN BLOOD BY AUTOMATED COUNT: 49.8 FL (ref 35.9–50)
ERYTHROCYTE [DISTWIDTH] IN BLOOD BY AUTOMATED COUNT: 50.8 FL (ref 35.9–50)
FOLATE SERPL-MCNC: 14 NG/ML
GFR SERPLBLD CREATININE-BSD FMLA CKD-EPI: 21 ML/MIN/1.73 M 2
GFR SERPLBLD CREATININE-BSD FMLA CKD-EPI: 29 ML/MIN/1.73 M 2
GFR SERPLBLD CREATININE-BSD FMLA CKD-EPI: 34 ML/MIN/1.73 M 2
GFR SERPLBLD CREATININE-BSD FMLA CKD-EPI: 39 ML/MIN/1.73 M 2
GFR SERPLBLD CREATININE-BSD FMLA CKD-EPI: 40 ML/MIN/1.73 M 2
GFR SERPLBLD CREATININE-BSD FMLA CKD-EPI: 46 ML/MIN/1.73 M 2
GFR SERPLBLD CREATININE-BSD FMLA CKD-EPI: 50 ML/MIN/1.73 M 2
GFR SERPLBLD CREATININE-BSD FMLA CKD-EPI: 53 ML/MIN/1.73 M 2
GFR SERPLBLD CREATININE-BSD FMLA CKD-EPI: 59 ML/MIN/1.73 M 2
GFR SERPLBLD CREATININE-BSD FMLA CKD-EPI: 60 ML/MIN/1.73 M 2
GFR SERPLBLD CREATININE-BSD FMLA CKD-EPI: 62 ML/MIN/1.73 M 2
GLOBULIN SER CALC-MCNC: 2.1 G/DL (ref 1.9–3.5)
GLOBULIN SER CALC-MCNC: 2.2 G/DL (ref 1.9–3.5)
GLOBULIN SER CALC-MCNC: 2.2 G/DL (ref 1.9–3.5)
GLOBULIN SER CALC-MCNC: 2.5 G/DL (ref 1.9–3.5)
GLUCOSE BLD STRIP.AUTO-MCNC: 132 MG/DL (ref 65–99)
GLUCOSE SERPL-MCNC: 112 MG/DL (ref 65–99)
GLUCOSE SERPL-MCNC: 115 MG/DL (ref 65–99)
GLUCOSE SERPL-MCNC: 119 MG/DL (ref 65–99)
GLUCOSE SERPL-MCNC: 120 MG/DL (ref 65–99)
GLUCOSE SERPL-MCNC: 127 MG/DL (ref 65–99)
GLUCOSE SERPL-MCNC: 129 MG/DL (ref 65–99)
GLUCOSE SERPL-MCNC: 142 MG/DL (ref 65–99)
GLUCOSE SERPL-MCNC: 143 MG/DL (ref 65–99)
GLUCOSE SERPL-MCNC: 156 MG/DL (ref 65–99)
GLUCOSE SERPL-MCNC: 86 MG/DL (ref 65–99)
GLUCOSE SERPL-MCNC: 97 MG/DL (ref 65–99)
GLUCOSE UR STRIP.AUTO-MCNC: NEGATIVE MG/DL
GLUCOSE UR STRIP.AUTO-MCNC: NEGATIVE MG/DL
HCT VFR BLD AUTO: 28.2 % (ref 42–52)
HCT VFR BLD AUTO: 29 % (ref 42–52)
HCT VFR BLD AUTO: 29.6 % (ref 42–52)
HCT VFR BLD AUTO: 31.1 % (ref 42–52)
HCT VFR BLD AUTO: 31.4 % (ref 42–52)
HCT VFR BLD AUTO: 34.6 % (ref 42–52)
HCT VFR BLD AUTO: 35.2 % (ref 42–52)
HCT VFR BLD AUTO: 37.2 % (ref 42–52)
HCT VFR BLD AUTO: 37.3 % (ref 42–52)
HGB BLD-MCNC: 11.7 G/DL (ref 14–18)
HGB BLD-MCNC: 12.1 G/DL (ref 14–18)
HGB BLD-MCNC: 12.2 G/DL (ref 14–18)
HGB BLD-MCNC: 12.6 G/DL (ref 14–18)
HGB BLD-MCNC: 9.4 G/DL (ref 14–18)
HGB BLD-MCNC: 9.5 G/DL (ref 14–18)
HGB BLD-MCNC: 9.7 G/DL (ref 14–18)
HGB BLD-MCNC: 9.9 G/DL (ref 14–18)
HGB BLD-MCNC: 9.9 G/DL (ref 14–18)
HYALINE CASTS #/AREA URNS LPF: ABNORMAL /LPF
HYALINE CASTS #/AREA URNS LPF: ABNORMAL /LPF
IMM GRANULOCYTES # BLD AUTO: 0.04 K/UL (ref 0–0.11)
IMM GRANULOCYTES NFR BLD AUTO: 0.6 % (ref 0–0.9)
INR PPP: 1.26 (ref 0.87–1.13)
IRON SATN MFR SERPL: 16 % (ref 15–55)
IRON SERPL-MCNC: 32 UG/DL (ref 50–180)
KETONES UR STRIP.AUTO-MCNC: NEGATIVE MG/DL
KETONES UR STRIP.AUTO-MCNC: NEGATIVE MG/DL
LACTATE SERPL-SCNC: 0.6 MMOL/L (ref 0.5–2)
LACTATE SERPL-SCNC: 1.2 MMOL/L (ref 0.5–2)
LEUKOCYTE ESTERASE UR QL STRIP.AUTO: ABNORMAL
LEUKOCYTE ESTERASE UR QL STRIP.AUTO: ABNORMAL
LIPASE SERPL-CCNC: 111 U/L (ref 11–82)
LIPASE SERPL-CCNC: 16 U/L (ref 11–82)
LV EJECT FRACT  99904: 55
LV EJECT FRACT MOD 2C 99903: 49.07
LV EJECT FRACT MOD 4C 99902: 59.78
LV EJECT FRACT MOD BP 99901: 53.78
LYMPHOCYTES # BLD AUTO: 1.13 K/UL (ref 1–4.8)
LYMPHOCYTES NFR BLD: 17.4 % (ref 22–41)
MAGNESIUM SERPL-MCNC: 1.7 MG/DL (ref 1.5–2.5)
MCH RBC QN AUTO: 31.9 PG (ref 27–33)
MCH RBC QN AUTO: 32.1 PG (ref 27–33)
MCH RBC QN AUTO: 32.2 PG (ref 27–33)
MCH RBC QN AUTO: 32.4 PG (ref 27–33)
MCH RBC QN AUTO: 32.5 PG (ref 27–33)
MCH RBC QN AUTO: 32.7 PG (ref 27–33)
MCH RBC QN AUTO: 32.8 PG (ref 27–33)
MCH RBC QN AUTO: 32.8 PG (ref 27–33)
MCH RBC QN AUTO: 32.9 PG (ref 27–33)
MCHC RBC AUTO-ENTMCNC: 31.5 G/DL (ref 33.7–35.3)
MCHC RBC AUTO-ENTMCNC: 31.8 G/DL (ref 33.7–35.3)
MCHC RBC AUTO-ENTMCNC: 32.7 G/DL (ref 33.7–35.3)
MCHC RBC AUTO-ENTMCNC: 32.8 G/DL (ref 33.7–35.3)
MCHC RBC AUTO-ENTMCNC: 32.8 G/DL (ref 33.7–35.3)
MCHC RBC AUTO-ENTMCNC: 33.2 G/DL (ref 33.7–35.3)
MCHC RBC AUTO-ENTMCNC: 33.3 G/DL (ref 33.7–35.3)
MCHC RBC AUTO-ENTMCNC: 33.9 G/DL (ref 33.7–35.3)
MCHC RBC AUTO-ENTMCNC: 35 G/DL (ref 33.7–35.3)
MCV RBC AUTO: 100 FL (ref 81.4–97.8)
MCV RBC AUTO: 100.3 FL (ref 81.4–97.8)
MCV RBC AUTO: 101.9 FL (ref 81.4–97.8)
MCV RBC AUTO: 93.5 FL (ref 81.4–97.8)
MCV RBC AUTO: 96.9 FL (ref 81.4–97.8)
MCV RBC AUTO: 97.6 FL (ref 81.4–97.8)
MCV RBC AUTO: 98.4 FL (ref 81.4–97.8)
MCV RBC AUTO: 98.9 FL (ref 81.4–97.8)
MCV RBC AUTO: 99 FL (ref 81.4–97.8)
MICRO URNS: ABNORMAL
MICRO URNS: ABNORMAL
MONOCYTES # BLD AUTO: 0.6 K/UL (ref 0–0.85)
MONOCYTES NFR BLD AUTO: 9.2 % (ref 0–13.4)
NEUTROPHILS # BLD AUTO: 4.5 K/UL (ref 1.82–7.42)
NEUTROPHILS NFR BLD: 69.4 % (ref 44–72)
NITRITE UR QL STRIP.AUTO: NEGATIVE
NITRITE UR QL STRIP.AUTO: NEGATIVE
NRBC # BLD AUTO: 0 K/UL
NRBC BLD-RTO: 0 /100 WBC
NT-PROBNP SERPL IA-MCNC: 9967 PG/ML (ref 0–125)
NT-PROBNP SERPL IA-MCNC: ABNORMAL PG/ML (ref 0–125)
PH UR STRIP.AUTO: 5.5 [PH] (ref 5–8)
PH UR STRIP.AUTO: 8.5 [PH] (ref 5–8)
PLATELET # BLD AUTO: 108 K/UL (ref 164–446)
PLATELET # BLD AUTO: 109 K/UL (ref 164–446)
PLATELET # BLD AUTO: 113 K/UL (ref 164–446)
PLATELET # BLD AUTO: 128 K/UL (ref 164–446)
PLATELET # BLD AUTO: 129 K/UL (ref 164–446)
PLATELET # BLD AUTO: 168 K/UL (ref 164–446)
PLATELET # BLD AUTO: 188 K/UL (ref 164–446)
PLATELET # BLD AUTO: 196 K/UL (ref 164–446)
PLATELET # BLD AUTO: 220 K/UL (ref 164–446)
PMV BLD AUTO: 9.3 FL (ref 9–12.9)
PMV BLD AUTO: 9.4 FL (ref 9–12.9)
PMV BLD AUTO: 9.4 FL (ref 9–12.9)
PMV BLD AUTO: 9.5 FL (ref 9–12.9)
PMV BLD AUTO: 9.5 FL (ref 9–12.9)
PMV BLD AUTO: 9.6 FL (ref 9–12.9)
PMV BLD AUTO: 9.6 FL (ref 9–12.9)
PMV BLD AUTO: 9.8 FL (ref 9–12.9)
PMV BLD AUTO: 9.8 FL (ref 9–12.9)
POTASSIUM SERPL-SCNC: 3.2 MMOL/L (ref 3.6–5.5)
POTASSIUM SERPL-SCNC: 3.3 MMOL/L (ref 3.6–5.5)
POTASSIUM SERPL-SCNC: 3.4 MMOL/L (ref 3.6–5.5)
POTASSIUM SERPL-SCNC: 3.6 MMOL/L (ref 3.6–5.5)
POTASSIUM SERPL-SCNC: 3.6 MMOL/L (ref 3.6–5.5)
POTASSIUM SERPL-SCNC: 3.9 MMOL/L (ref 3.6–5.5)
POTASSIUM SERPL-SCNC: 4.1 MMOL/L (ref 3.6–5.5)
POTASSIUM SERPL-SCNC: 4.2 MMOL/L (ref 3.6–5.5)
POTASSIUM SERPL-SCNC: 4.3 MMOL/L (ref 3.6–5.5)
PROCALCITONIN SERPL-MCNC: <0.05 NG/ML
PROT SERPL-MCNC: 5.2 G/DL (ref 6–8.2)
PROT SERPL-MCNC: 5.3 G/DL (ref 6–8.2)
PROT SERPL-MCNC: 5.4 G/DL (ref 6–8.2)
PROT SERPL-MCNC: 5.5 G/DL (ref 6–8.2)
PROT SERPL-MCNC: 6.1 G/DL (ref 6–8.2)
PROT UR QL STRIP: NEGATIVE MG/DL
PROT UR QL STRIP: NEGATIVE MG/DL
PROTHROMBIN TIME: 15.6 SEC (ref 12–14.6)
RBC # BLD AUTO: 2.89 M/UL (ref 4.7–6.1)
RBC # BLD AUTO: 2.9 M/UL (ref 4.7–6.1)
RBC # BLD AUTO: 2.99 M/UL (ref 4.7–6.1)
RBC # BLD AUTO: 3.08 M/UL (ref 4.7–6.1)
RBC # BLD AUTO: 3.1 M/UL (ref 4.7–6.1)
RBC # BLD AUTO: 3.56 M/UL (ref 4.7–6.1)
RBC # BLD AUTO: 3.7 M/UL (ref 4.7–6.1)
RBC # BLD AUTO: 3.79 M/UL (ref 4.7–6.1)
RBC # BLD AUTO: 3.84 M/UL (ref 4.7–6.1)
RBC # URNS HPF: ABNORMAL /HPF
RBC # URNS HPF: ABNORMAL /HPF
RBC UR QL AUTO: NEGATIVE
RBC UR QL AUTO: NEGATIVE
SODIUM SERPL-SCNC: 139 MMOL/L (ref 135–145)
SODIUM SERPL-SCNC: 140 MMOL/L (ref 135–145)
SODIUM SERPL-SCNC: 141 MMOL/L (ref 135–145)
SODIUM SERPL-SCNC: 143 MMOL/L (ref 135–145)
SODIUM SERPL-SCNC: 146 MMOL/L (ref 135–145)
SODIUM SERPL-SCNC: 149 MMOL/L (ref 135–145)
SP GR UR STRIP.AUTO: 1.02
SP GR UR STRIP.AUTO: 1.02
TIBC SERPL-MCNC: 199 UG/DL (ref 250–450)
TSH SERPL DL<=0.005 MIU/L-ACNC: 2.21 UIU/ML (ref 0.38–5.33)
UIBC SERPL-MCNC: 167 UG/DL (ref 110–370)
UROBILINOGEN UR STRIP.AUTO-MCNC: 1 MG/DL
UROBILINOGEN UR STRIP.AUTO-MCNC: 2 MG/DL
VIT B12 SERPL-MCNC: 1908 PG/ML (ref 211–911)
WBC # BLD AUTO: 10.4 K/UL (ref 4.8–10.8)
WBC # BLD AUTO: 11.1 K/UL (ref 4.8–10.8)
WBC # BLD AUTO: 5.3 K/UL (ref 4.8–10.8)
WBC # BLD AUTO: 5.6 K/UL (ref 4.8–10.8)
WBC # BLD AUTO: 5.6 K/UL (ref 4.8–10.8)
WBC # BLD AUTO: 5.8 K/UL (ref 4.8–10.8)
WBC # BLD AUTO: 6.5 K/UL (ref 4.8–10.8)
WBC # BLD AUTO: 8.4 K/UL (ref 4.8–10.8)
WBC # BLD AUTO: 9.3 K/UL (ref 4.8–10.8)
WBC #/AREA URNS HPF: ABNORMAL /HPF
WBC #/AREA URNS HPF: ABNORMAL /HPF

## 2023-01-01 PROCEDURE — A9270 NON-COVERED ITEM OR SERVICE: HCPCS | Performed by: STUDENT IN AN ORGANIZED HEALTH CARE EDUCATION/TRAINING PROGRAM

## 2023-01-01 PROCEDURE — 700102 HCHG RX REV CODE 250 W/ 637 OVERRIDE(OP): Performed by: STUDENT IN AN ORGANIZED HEALTH CARE EDUCATION/TRAINING PROGRAM

## 2023-01-01 PROCEDURE — 700111 HCHG RX REV CODE 636 W/ 250 OVERRIDE (IP): Performed by: STUDENT IN AN ORGANIZED HEALTH CARE EDUCATION/TRAINING PROGRAM

## 2023-01-01 PROCEDURE — 80048 BASIC METABOLIC PNL TOTAL CA: CPT

## 2023-01-01 PROCEDURE — 36415 COLL VENOUS BLD VENIPUNCTURE: CPT

## 2023-01-01 PROCEDURE — 93306 TTE W/DOPPLER COMPLETE: CPT

## 2023-01-01 PROCEDURE — 73552 X-RAY EXAM OF FEMUR 2/>: CPT | Mod: LT

## 2023-01-01 PROCEDURE — 97530 THERAPEUTIC ACTIVITIES: CPT

## 2023-01-01 PROCEDURE — 83605 ASSAY OF LACTIC ACID: CPT

## 2023-01-01 PROCEDURE — A9270 NON-COVERED ITEM OR SERVICE: HCPCS | Performed by: HOSPITALIST

## 2023-01-01 PROCEDURE — 700101 HCHG RX REV CODE 250: Performed by: HOSPITALIST

## 2023-01-01 PROCEDURE — 83540 ASSAY OF IRON: CPT

## 2023-01-01 PROCEDURE — 700111 HCHG RX REV CODE 636 W/ 250 OVERRIDE (IP)

## 2023-01-01 PROCEDURE — 700111 HCHG RX REV CODE 636 W/ 250 OVERRIDE (IP): Performed by: INTERNAL MEDICINE

## 2023-01-01 PROCEDURE — 83690 ASSAY OF LIPASE: CPT

## 2023-01-01 PROCEDURE — 700102 HCHG RX REV CODE 250 W/ 637 OVERRIDE(OP)

## 2023-01-01 PROCEDURE — 81001 URINALYSIS AUTO W/SCOPE: CPT

## 2023-01-01 PROCEDURE — 770020 HCHG ROOM/CARE - TELE (206)

## 2023-01-01 PROCEDURE — 99233 SBSQ HOSP IP/OBS HIGH 50: CPT | Performed by: HOSPITALIST

## 2023-01-01 PROCEDURE — 700101 HCHG RX REV CODE 250: Performed by: INTERNAL MEDICINE

## 2023-01-01 PROCEDURE — 85027 COMPLETE CBC AUTOMATED: CPT

## 2023-01-01 PROCEDURE — 770001 HCHG ROOM/CARE - MED/SURG/GYN PRIV*

## 2023-01-01 PROCEDURE — 82746 ASSAY OF FOLIC ACID SERUM: CPT

## 2023-01-01 PROCEDURE — 700102 HCHG RX REV CODE 250 W/ 637 OVERRIDE(OP): Performed by: HOSPITALIST

## 2023-01-01 PROCEDURE — 85025 COMPLETE CBC W/AUTO DIFF WBC: CPT

## 2023-01-01 PROCEDURE — 70450 CT HEAD/BRAIN W/O DYE: CPT

## 2023-01-01 PROCEDURE — 93306 TTE W/DOPPLER COMPLETE: CPT | Mod: 26 | Performed by: INTERNAL MEDICINE

## 2023-01-01 PROCEDURE — 92526 ORAL FUNCTION THERAPY: CPT

## 2023-01-01 PROCEDURE — 0SSBXZZ REPOSITION LEFT HIP JOINT, EXTERNAL APPROACH: ICD-10-PCS | Performed by: EMERGENCY MEDICINE

## 2023-01-01 PROCEDURE — 99233 SBSQ HOSP IP/OBS HIGH 50: CPT | Performed by: INTERNAL MEDICINE

## 2023-01-01 PROCEDURE — 96374 THER/PROPH/DIAG INJ IV PUSH: CPT

## 2023-01-01 PROCEDURE — 85730 THROMBOPLASTIN TIME PARTIAL: CPT

## 2023-01-01 PROCEDURE — 700102 HCHG RX REV CODE 250 W/ 637 OVERRIDE(OP): Performed by: INTERNAL MEDICINE

## 2023-01-01 PROCEDURE — 96372 THER/PROPH/DIAG INJ SC/IM: CPT

## 2023-01-01 PROCEDURE — 93010 ELECTROCARDIOGRAM REPORT: CPT | Performed by: INTERNAL MEDICINE

## 2023-01-01 PROCEDURE — 27265 TREAT HIP DISLOCATION: CPT

## 2023-01-01 PROCEDURE — 700111 HCHG RX REV CODE 636 W/ 250 OVERRIDE (IP): Performed by: HOSPITALIST

## 2023-01-01 PROCEDURE — 80053 COMPREHEN METABOLIC PANEL: CPT

## 2023-01-01 PROCEDURE — G0378 HOSPITAL OBSERVATION PER HR: HCPCS

## 2023-01-01 PROCEDURE — 99233 SBSQ HOSP IP/OBS HIGH 50: CPT | Performed by: STUDENT IN AN ORGANIZED HEALTH CARE EDUCATION/TRAINING PROGRAM

## 2023-01-01 PROCEDURE — 51798 US URINE CAPACITY MEASURE: CPT

## 2023-01-01 PROCEDURE — 83880 ASSAY OF NATRIURETIC PEPTIDE: CPT

## 2023-01-01 PROCEDURE — A9270 NON-COVERED ITEM OR SERVICE: HCPCS

## 2023-01-01 PROCEDURE — 700105 HCHG RX REV CODE 258: Performed by: STUDENT IN AN ORGANIZED HEALTH CARE EDUCATION/TRAINING PROGRAM

## 2023-01-01 PROCEDURE — 99233 SBSQ HOSP IP/OBS HIGH 50: CPT | Mod: 25 | Performed by: INTERNAL MEDICINE

## 2023-01-01 PROCEDURE — A9270 NON-COVERED ITEM OR SERVICE: HCPCS | Performed by: INTERNAL MEDICINE

## 2023-01-01 PROCEDURE — 97116 GAIT TRAINING THERAPY: CPT

## 2023-01-01 PROCEDURE — 770004 HCHG ROOM/CARE - ONCOLOGY PRIVATE *

## 2023-01-01 PROCEDURE — 99285 EMERGENCY DEPT VISIT HI MDM: CPT

## 2023-01-01 PROCEDURE — 80076 HEPATIC FUNCTION PANEL: CPT

## 2023-01-01 PROCEDURE — 99497 ADVNCD CARE PLAN 30 MIN: CPT | Performed by: INTERNAL MEDICINE

## 2023-01-01 PROCEDURE — 94799 UNLISTED PULMONARY SVC/PX: CPT

## 2023-01-01 PROCEDURE — 85610 PROTHROMBIN TIME: CPT

## 2023-01-01 PROCEDURE — 97166 OT EVAL MOD COMPLEX 45 MIN: CPT

## 2023-01-01 PROCEDURE — 83550 IRON BINDING TEST: CPT

## 2023-01-01 PROCEDURE — 97535 SELF CARE MNGMENT TRAINING: CPT

## 2023-01-01 PROCEDURE — 82607 VITAMIN B-12: CPT

## 2023-01-01 PROCEDURE — 94760 N-INVAS EAR/PLS OXIMETRY 1: CPT

## 2023-01-01 PROCEDURE — 72170 X-RAY EXAM OF PELVIS: CPT

## 2023-01-01 PROCEDURE — 93005 ELECTROCARDIOGRAM TRACING: CPT | Performed by: HOSPITALIST

## 2023-01-01 PROCEDURE — 83735 ASSAY OF MAGNESIUM: CPT

## 2023-01-01 PROCEDURE — 92610 EVALUATE SWALLOWING FUNCTION: CPT

## 2023-01-01 PROCEDURE — 97163 PT EVAL HIGH COMPLEX 45 MIN: CPT

## 2023-01-01 PROCEDURE — 770006 HCHG ROOM/CARE - MED/SURG/GYN SEMI*

## 2023-01-01 PROCEDURE — 84145 PROCALCITONIN (PCT): CPT

## 2023-01-01 PROCEDURE — 82140 ASSAY OF AMMONIA: CPT

## 2023-01-01 PROCEDURE — 74176 CT ABD & PELVIS W/O CONTRAST: CPT

## 2023-01-01 PROCEDURE — 71045 X-RAY EXAM CHEST 1 VIEW: CPT

## 2023-01-01 PROCEDURE — 99239 HOSP IP/OBS DSCHRG MGMT >30: CPT | Performed by: INTERNAL MEDICINE

## 2023-01-01 PROCEDURE — 700105 HCHG RX REV CODE 258

## 2023-01-01 PROCEDURE — 700111 HCHG RX REV CODE 636 W/ 250 OVERRIDE (IP): Performed by: EMERGENCY MEDICINE

## 2023-01-01 PROCEDURE — 93005 ELECTROCARDIOGRAM TRACING: CPT | Performed by: STUDENT IN AN ORGANIZED HEALTH CARE EDUCATION/TRAINING PROGRAM

## 2023-01-01 PROCEDURE — 700111 HCHG RX REV CODE 636 W/ 250 OVERRIDE (IP): Performed by: NURSE PRACTITIONER

## 2023-01-01 PROCEDURE — 82962 GLUCOSE BLOOD TEST: CPT

## 2023-01-01 PROCEDURE — 84443 ASSAY THYROID STIM HORMONE: CPT

## 2023-01-01 RX ORDER — ALLOPURINOL 300 MG/1
300 TABLET ORAL DAILY
Status: DISCONTINUED | OUTPATIENT
Start: 2023-01-01 | End: 2023-01-01

## 2023-01-01 RX ORDER — HALOPERIDOL 5 MG/ML
2.5 INJECTION INTRAMUSCULAR ONCE
Status: COMPLETED | OUTPATIENT
Start: 2023-01-01 | End: 2023-01-01

## 2023-01-01 RX ORDER — SODIUM CHLORIDE 9 MG/ML
1000 INJECTION, SOLUTION INTRAVENOUS ONCE
Status: DISCONTINUED | OUTPATIENT
Start: 2023-01-01 | End: 2023-01-01

## 2023-01-01 RX ORDER — FUROSEMIDE 10 MG/ML
20 INJECTION INTRAMUSCULAR; INTRAVENOUS ONCE
Status: DISCONTINUED | OUTPATIENT
Start: 2023-01-01 | End: 2023-01-01

## 2023-01-01 RX ORDER — LORAZEPAM 2 MG/ML
1 INJECTION INTRAMUSCULAR
Status: DISCONTINUED | OUTPATIENT
Start: 2023-01-01 | End: 2023-05-08 | Stop reason: HOSPADM

## 2023-01-01 RX ORDER — ATORVASTATIN CALCIUM 80 MG/1
80 TABLET, FILM COATED ORAL NIGHTLY
COMMUNITY

## 2023-01-01 RX ORDER — POTASSIUM CHLORIDE 20 MEQ/1
60 TABLET, EXTENDED RELEASE ORAL ONCE
Status: COMPLETED | OUTPATIENT
Start: 2023-01-01 | End: 2023-01-01

## 2023-01-01 RX ORDER — OMEPRAZOLE 20 MG/1
20 CAPSULE, DELAYED RELEASE ORAL DAILY
Status: DISCONTINUED | OUTPATIENT
Start: 2023-01-01 | End: 2023-01-01

## 2023-01-01 RX ORDER — TAMSULOSIN HYDROCHLORIDE 0.4 MG/1
0.8 CAPSULE ORAL EVERY EVENING
Status: DISCONTINUED | OUTPATIENT
Start: 2023-01-01 | End: 2023-01-01

## 2023-01-01 RX ORDER — LORAZEPAM 2 MG/ML
3 INJECTION INTRAMUSCULAR ONCE
Status: COMPLETED | OUTPATIENT
Start: 2023-01-01 | End: 2023-01-01

## 2023-01-01 RX ORDER — METOCLOPRAMIDE HYDROCHLORIDE 5 MG/ML
10 INJECTION INTRAMUSCULAR; INTRAVENOUS EVERY 6 HOURS PRN
Status: DISCONTINUED | OUTPATIENT
Start: 2023-01-01 | End: 2023-01-01

## 2023-01-01 RX ORDER — PROCHLORPERAZINE EDISYLATE 5 MG/ML
10 INJECTION INTRAMUSCULAR; INTRAVENOUS EVERY 6 HOURS PRN
Status: DISCONTINUED | OUTPATIENT
Start: 2023-01-01 | End: 2023-01-01

## 2023-01-01 RX ORDER — SUCRALFATE ORAL 1 G/10ML
1 SUSPENSION ORAL EVERY 6 HOURS
Status: DISCONTINUED | OUTPATIENT
Start: 2023-01-01 | End: 2023-01-01

## 2023-01-01 RX ORDER — AMOXICILLIN 250 MG
2 CAPSULE ORAL 2 TIMES DAILY
Status: DISCONTINUED | OUTPATIENT
Start: 2023-01-01 | End: 2023-05-08 | Stop reason: HOSPADM

## 2023-01-01 RX ORDER — CARVEDILOL 12.5 MG/1
12.5 TABLET ORAL 2 TIMES DAILY WITH MEALS
Status: DISCONTINUED | OUTPATIENT
Start: 2023-01-01 | End: 2023-01-01

## 2023-01-01 RX ORDER — METOPROLOL SUCCINATE 25 MG/1
12.5 TABLET, EXTENDED RELEASE ORAL DAILY
COMMUNITY

## 2023-01-01 RX ORDER — ASPIRIN 81 MG/1
81 TABLET, CHEWABLE ORAL ONCE
Status: DISCONTINUED | OUTPATIENT
Start: 2023-01-01 | End: 2023-01-01

## 2023-01-01 RX ORDER — POLYETHYLENE GLYCOL 3350 17 G/17G
1 POWDER, FOR SOLUTION ORAL
Status: DISCONTINUED | OUTPATIENT
Start: 2023-01-01 | End: 2023-05-08 | Stop reason: HOSPADM

## 2023-01-01 RX ORDER — FUROSEMIDE 10 MG/ML
40 INJECTION INTRAMUSCULAR; INTRAVENOUS
Status: DISCONTINUED | OUTPATIENT
Start: 2023-01-01 | End: 2023-01-01

## 2023-01-01 RX ORDER — TRAZODONE HYDROCHLORIDE 50 MG/1
50 TABLET ORAL NIGHTLY
COMMUNITY

## 2023-01-01 RX ORDER — HYDROMORPHONE HYDROCHLORIDE 1 MG/ML
0.5 INJECTION, SOLUTION INTRAMUSCULAR; INTRAVENOUS; SUBCUTANEOUS ONCE
Status: COMPLETED | OUTPATIENT
Start: 2023-01-01 | End: 2023-01-01

## 2023-01-01 RX ORDER — ONDANSETRON 4 MG/1
4 TABLET, ORALLY DISINTEGRATING ORAL EVERY 8 HOURS PRN
Status: DISCONTINUED | OUTPATIENT
Start: 2023-01-01 | End: 2023-05-08 | Stop reason: HOSPADM

## 2023-01-01 RX ORDER — SODIUM CHLORIDE 9 MG/ML
1000 INJECTION, SOLUTION INTRAVENOUS ONCE
Status: COMPLETED | OUTPATIENT
Start: 2023-01-01 | End: 2023-01-01

## 2023-01-01 RX ORDER — POTASSIUM CHLORIDE 20 MEQ/1
40 TABLET, EXTENDED RELEASE ORAL ONCE
Status: COMPLETED | OUTPATIENT
Start: 2023-01-01 | End: 2023-01-01

## 2023-01-01 RX ORDER — CARVEDILOL 6.25 MG/1
6.25 TABLET ORAL ONCE
Status: COMPLETED | OUTPATIENT
Start: 2023-01-01 | End: 2023-01-01

## 2023-01-01 RX ORDER — LORAZEPAM 2 MG/ML
1 CONCENTRATE ORAL
Status: DISCONTINUED | OUTPATIENT
Start: 2023-01-01 | End: 2023-05-08 | Stop reason: HOSPADM

## 2023-01-01 RX ORDER — PROPOFOL 10 MG/ML
50 INJECTION, EMULSION INTRAVENOUS ONCE
Status: DISPENSED | OUTPATIENT
Start: 2023-01-01 | End: 2023-01-01

## 2023-01-01 RX ORDER — TORSEMIDE 20 MG/1
10 TABLET ORAL
Status: DISCONTINUED | OUTPATIENT
Start: 2023-01-01 | End: 2023-01-01

## 2023-01-01 RX ORDER — LORAZEPAM 2 MG/ML
0.5 INJECTION INTRAMUSCULAR ONCE
Status: COMPLETED | OUTPATIENT
Start: 2023-01-01 | End: 2023-01-01

## 2023-01-01 RX ORDER — FUROSEMIDE 10 MG/ML
40 INJECTION INTRAMUSCULAR; INTRAVENOUS ONCE
Status: COMPLETED | OUTPATIENT
Start: 2023-01-01 | End: 2023-01-01

## 2023-01-01 RX ORDER — HALOPERIDOL 5 MG/ML
2 INJECTION INTRAMUSCULAR ONCE
Status: COMPLETED | OUTPATIENT
Start: 2023-01-01 | End: 2023-01-01

## 2023-01-01 RX ORDER — CARVEDILOL 6.25 MG/1
6.25 TABLET ORAL 2 TIMES DAILY WITH MEALS
Status: DISCONTINUED | OUTPATIENT
Start: 2023-01-01 | End: 2023-01-01

## 2023-01-01 RX ORDER — TRAZODONE HYDROCHLORIDE 100 MG/1
50 TABLET ORAL NIGHTLY
Status: DISCONTINUED | OUTPATIENT
Start: 2023-01-01 | End: 2023-05-08 | Stop reason: HOSPADM

## 2023-01-01 RX ORDER — ONDANSETRON 2 MG/ML
4 INJECTION INTRAMUSCULAR; INTRAVENOUS EVERY 4 HOURS PRN
Status: DISCONTINUED | OUTPATIENT
Start: 2023-01-01 | End: 2023-05-08 | Stop reason: HOSPADM

## 2023-01-01 RX ORDER — LORAZEPAM 2 MG/ML
1 INJECTION INTRAMUSCULAR
Status: COMPLETED | OUTPATIENT
Start: 2023-01-01 | End: 2023-01-01

## 2023-01-01 RX ORDER — LIDOCAINE 50 MG/G
2 PATCH TOPICAL EVERY 24 HOURS
Status: DISCONTINUED | OUTPATIENT
Start: 2023-01-01 | End: 2023-05-08 | Stop reason: HOSPADM

## 2023-01-01 RX ORDER — METOCLOPRAMIDE HYDROCHLORIDE 5 MG/ML
10 INJECTION INTRAMUSCULAR; INTRAVENOUS EVERY 6 HOURS
Status: DISCONTINUED | OUTPATIENT
Start: 2023-01-01 | End: 2023-01-01

## 2023-01-01 RX ORDER — SCOLOPAMINE TRANSDERMAL SYSTEM 1 MG/1
1 PATCH, EXTENDED RELEASE TRANSDERMAL
Status: DISCONTINUED | OUTPATIENT
Start: 2023-01-01 | End: 2023-05-08 | Stop reason: HOSPADM

## 2023-01-01 RX ORDER — ATROPINE SULFATE 10 MG/ML
2 SOLUTION/ DROPS OPHTHALMIC EVERY 4 HOURS PRN
Status: DISCONTINUED | OUTPATIENT
Start: 2023-01-01 | End: 2023-05-08 | Stop reason: HOSPADM

## 2023-01-01 RX ORDER — ACETAMINOPHEN 325 MG/1
650 TABLET ORAL EVERY 6 HOURS PRN
Status: DISCONTINUED | OUTPATIENT
Start: 2023-01-01 | End: 2023-05-08 | Stop reason: HOSPADM

## 2023-01-01 RX ORDER — OXYCODONE HYDROCHLORIDE 5 MG/1
5 TABLET ORAL EVERY 4 HOURS PRN
Status: DISCONTINUED | OUTPATIENT
Start: 2023-01-01 | End: 2023-01-01

## 2023-01-01 RX ORDER — LORAZEPAM 2 MG/ML
1 INJECTION INTRAMUSCULAR ONCE
Status: DISCONTINUED | OUTPATIENT
Start: 2023-01-01 | End: 2023-01-01

## 2023-01-01 RX ORDER — HALOPERIDOL 5 MG/ML
2 INJECTION INTRAMUSCULAR EVERY 4 HOURS PRN
Status: DISCONTINUED | OUTPATIENT
Start: 2023-01-01 | End: 2023-05-08 | Stop reason: HOSPADM

## 2023-01-01 RX ORDER — ENOXAPARIN SODIUM 100 MG/ML
40 INJECTION SUBCUTANEOUS DAILY
Status: DISCONTINUED | OUTPATIENT
Start: 2023-01-01 | End: 2023-01-01

## 2023-01-01 RX ORDER — METOPROLOL SUCCINATE 25 MG/1
12.5 TABLET, EXTENDED RELEASE ORAL DAILY
Status: DISCONTINUED | OUTPATIENT
Start: 2023-01-01 | End: 2023-01-01

## 2023-01-01 RX ORDER — BISACODYL 10 MG
10 SUPPOSITORY, RECTAL RECTAL
Status: DISCONTINUED | OUTPATIENT
Start: 2023-01-01 | End: 2023-05-08 | Stop reason: HOSPADM

## 2023-01-01 RX ORDER — LANOLIN ALCOHOL/MO/W.PET/CERES
400 CREAM (GRAM) TOPICAL 2 TIMES DAILY
Status: DISCONTINUED | OUTPATIENT
Start: 2023-01-01 | End: 2023-01-01

## 2023-01-01 RX ORDER — QUETIAPINE FUMARATE 25 MG/1
25 TABLET, FILM COATED ORAL NIGHTLY
Status: DISCONTINUED | OUTPATIENT
Start: 2023-01-01 | End: 2023-01-01

## 2023-01-01 RX ORDER — QUETIAPINE FUMARATE 25 MG/1
25 TABLET, FILM COATED ORAL 2 TIMES DAILY
Status: DISCONTINUED | OUTPATIENT
Start: 2023-01-01 | End: 2023-01-01

## 2023-01-01 RX ORDER — ATORVASTATIN CALCIUM 80 MG/1
80 TABLET, FILM COATED ORAL NIGHTLY
Status: DISCONTINUED | OUTPATIENT
Start: 2023-01-01 | End: 2023-01-01

## 2023-01-01 RX ORDER — LORAZEPAM 2 MG/ML
0.5 INJECTION INTRAMUSCULAR EVERY 6 HOURS PRN
Status: DISCONTINUED | OUTPATIENT
Start: 2023-01-01 | End: 2023-01-01

## 2023-01-01 RX ORDER — SODIUM CHLORIDE 9 MG/ML
INJECTION, SOLUTION INTRAVENOUS CONTINUOUS
Status: DISCONTINUED | OUTPATIENT
Start: 2023-01-01 | End: 2023-01-01

## 2023-01-01 RX ORDER — TAMSULOSIN HYDROCHLORIDE 0.4 MG/1
0.8 CAPSULE ORAL EVERY EVENING
COMMUNITY

## 2023-01-01 RX ORDER — FUROSEMIDE 10 MG/ML
80 INJECTION INTRAMUSCULAR; INTRAVENOUS ONCE
Status: COMPLETED | OUTPATIENT
Start: 2023-01-01 | End: 2023-01-01

## 2023-01-01 RX ORDER — PROPOFOL 10 MG/ML
INJECTION, EMULSION INTRAVENOUS
Status: COMPLETED | OUTPATIENT
Start: 2023-01-01 | End: 2023-01-01

## 2023-01-01 RX ORDER — HALOPERIDOL 5 MG/ML
5 INJECTION INTRAMUSCULAR ONCE
Status: COMPLETED | OUTPATIENT
Start: 2023-01-01 | End: 2023-01-01

## 2023-01-01 RX ORDER — HEPARIN SODIUM 5000 [USP'U]/ML
5000 INJECTION, SOLUTION INTRAVENOUS; SUBCUTANEOUS EVERY 8 HOURS
Status: DISCONTINUED | OUTPATIENT
Start: 2023-01-01 | End: 2023-01-01

## 2023-01-01 RX ORDER — DRONABINOL 2.5 MG/1
2.5 CAPSULE ORAL
Status: DISCONTINUED | OUTPATIENT
Start: 2023-01-01 | End: 2023-01-01

## 2023-01-01 RX ORDER — DIPHENHYDRAMINE HCL 25 MG
25 TABLET ORAL ONCE
Status: COMPLETED | OUTPATIENT
Start: 2023-01-01 | End: 2023-01-01

## 2023-01-01 RX ADMIN — HEPARIN SODIUM 5000 UNITS: 5000 INJECTION, SOLUTION INTRAVENOUS; SUBCUTANEOUS at 05:53

## 2023-01-01 RX ADMIN — FUROSEMIDE 40 MG: 10 INJECTION, SOLUTION INTRAMUSCULAR; INTRAVENOUS at 16:30

## 2023-01-01 RX ADMIN — ONDANSETRON HYDROCHLORIDE 4 MG: 2 SOLUTION INTRAMUSCULAR; INTRAVENOUS at 09:15

## 2023-01-01 RX ADMIN — TORSEMIDE 10 MG: 20 TABLET ORAL at 05:52

## 2023-01-01 RX ADMIN — LIDOCAINE 2 PATCH: 50 PATCH CUTANEOUS at 17:59

## 2023-01-01 RX ADMIN — ONDANSETRON 4 MG: 4 TABLET, ORALLY DISINTEGRATING ORAL at 14:54

## 2023-01-01 RX ADMIN — SUCRALFATE 1 G: 1 SUSPENSION ORAL at 12:48

## 2023-01-01 RX ADMIN — HEPARIN SODIUM 5000 UNITS: 5000 INJECTION, SOLUTION INTRAVENOUS; SUBCUTANEOUS at 20:08

## 2023-01-01 RX ADMIN — LORAZEPAM 0.5 MG: 2 INJECTION INTRAMUSCULAR; INTRAVENOUS at 22:37

## 2023-01-01 RX ADMIN — TRAZODONE HYDROCHLORIDE 50 MG: 50 TABLET ORAL at 22:09

## 2023-01-01 RX ADMIN — METOPROLOL SUCCINATE 12.5 MG: 25 TABLET, EXTENDED RELEASE ORAL at 04:51

## 2023-01-01 RX ADMIN — HEPARIN SODIUM 5000 UNITS: 5000 INJECTION, SOLUTION INTRAVENOUS; SUBCUTANEOUS at 12:47

## 2023-01-01 RX ADMIN — HEPARIN SODIUM 5000 UNITS: 5000 INJECTION, SOLUTION INTRAVENOUS; SUBCUTANEOUS at 13:43

## 2023-01-01 RX ADMIN — OXYCODONE 5 MG: 5 TABLET ORAL at 01:21

## 2023-01-01 RX ADMIN — CARVEDILOL 6.25 MG: 6.25 TABLET, FILM COATED ORAL at 16:27

## 2023-01-01 RX ADMIN — HEPARIN SODIUM 5000 UNITS: 5000 INJECTION, SOLUTION INTRAVENOUS; SUBCUTANEOUS at 15:15

## 2023-01-01 RX ADMIN — DOCUSATE SODIUM 50 MG AND SENNOSIDES 8.6 MG 2 TABLET: 8.6; 5 TABLET, FILM COATED ORAL at 05:52

## 2023-01-01 RX ADMIN — SUCRALFATE 1 G: 1 SUSPENSION ORAL at 23:26

## 2023-01-01 RX ADMIN — TAMSULOSIN HYDROCHLORIDE 0.8 MG: 0.4 CAPSULE ORAL at 20:49

## 2023-01-01 RX ADMIN — ALLOPURINOL 300 MG: 100 TABLET ORAL at 05:07

## 2023-01-01 RX ADMIN — POTASSIUM CHLORIDE 40 MEQ: 1500 TABLET, EXTENDED RELEASE ORAL at 09:36

## 2023-01-01 RX ADMIN — ONDANSETRON HYDROCHLORIDE 4 MG: 2 SOLUTION INTRAMUSCULAR; INTRAVENOUS at 07:20

## 2023-01-01 RX ADMIN — LIDOCAINE 2 PATCH: 50 PATCH CUTANEOUS at 16:27

## 2023-01-01 RX ADMIN — SODIUM CHLORIDE: 9 INJECTION, SOLUTION INTRAVENOUS at 20:45

## 2023-01-01 RX ADMIN — DOCUSATE SODIUM 50 MG AND SENNOSIDES 8.6 MG 2 TABLET: 8.6; 5 TABLET, FILM COATED ORAL at 17:55

## 2023-01-01 RX ADMIN — ALLOPURINOL 300 MG: 100 TABLET ORAL at 04:11

## 2023-01-01 RX ADMIN — Medication 1 TABLET: at 05:53

## 2023-01-01 RX ADMIN — PROCHLORPERAZINE EDISYLATE 10 MG: 5 INJECTION INTRAMUSCULAR; INTRAVENOUS at 14:57

## 2023-01-01 RX ADMIN — SUCRALFATE 1 G: 1 SUSPENSION ORAL at 04:11

## 2023-01-01 RX ADMIN — SUCRALFATE 1 G: 1 SUSPENSION ORAL at 04:47

## 2023-01-01 RX ADMIN — CARVEDILOL 6.25 MG: 6.25 TABLET, FILM COATED ORAL at 08:26

## 2023-01-01 RX ADMIN — Medication 400 MG: at 09:37

## 2023-01-01 RX ADMIN — ACETAMINOPHEN 650 MG: 325 TABLET, FILM COATED ORAL at 15:36

## 2023-01-01 RX ADMIN — HEPARIN SODIUM 5000 UNITS: 5000 INJECTION, SOLUTION INTRAVENOUS; SUBCUTANEOUS at 22:09

## 2023-01-01 RX ADMIN — HEPARIN SODIUM 5000 UNITS: 5000 INJECTION, SOLUTION INTRAVENOUS; SUBCUTANEOUS at 14:20

## 2023-01-01 RX ADMIN — DRONABINOL 2.5 MG: 2.5 CAPSULE ORAL at 11:34

## 2023-01-01 RX ADMIN — ASPIRIN 81 MG: 81 TABLET, COATED ORAL at 06:04

## 2023-01-01 RX ADMIN — OXYCODONE 5 MG: 5 TABLET ORAL at 09:28

## 2023-01-01 RX ADMIN — DOCUSATE SODIUM 50 MG AND SENNOSIDES 8.6 MG 2 TABLET: 8.6; 5 TABLET, FILM COATED ORAL at 16:50

## 2023-01-01 RX ADMIN — HEPARIN SODIUM 5000 UNITS: 5000 INJECTION, SOLUTION INTRAVENOUS; SUBCUTANEOUS at 14:08

## 2023-01-01 RX ADMIN — ACETAMINOPHEN 650 MG: 325 TABLET, FILM COATED ORAL at 20:52

## 2023-01-01 RX ADMIN — FUROSEMIDE 40 MG: 10 INJECTION, SOLUTION INTRAMUSCULAR; INTRAVENOUS at 05:07

## 2023-01-01 RX ADMIN — SODIUM CHLORIDE: 9 INJECTION, SOLUTION INTRAVENOUS at 20:01

## 2023-01-01 RX ADMIN — CARVEDILOL 12.5 MG: 12.5 TABLET, FILM COATED ORAL at 17:50

## 2023-01-01 RX ADMIN — DRONABINOL 2.5 MG: 2.5 CAPSULE ORAL at 12:05

## 2023-01-01 RX ADMIN — ONDANSETRON HYDROCHLORIDE 4 MG: 2 SOLUTION INTRAMUSCULAR; INTRAVENOUS at 18:35

## 2023-01-01 RX ADMIN — DOCUSATE SODIUM 50 MG AND SENNOSIDES 8.6 MG 2 TABLET: 8.6; 5 TABLET, FILM COATED ORAL at 05:44

## 2023-01-01 RX ADMIN — SUCRALFATE 1 G: 1 SUSPENSION ORAL at 05:52

## 2023-01-01 RX ADMIN — TAMSULOSIN HYDROCHLORIDE 0.8 MG: 0.4 CAPSULE ORAL at 16:36

## 2023-01-01 RX ADMIN — HEPARIN SODIUM 5000 UNITS: 5000 INJECTION, SOLUTION INTRAVENOUS; SUBCUTANEOUS at 04:52

## 2023-01-01 RX ADMIN — DOCUSATE SODIUM 50 MG AND SENNOSIDES 8.6 MG 2 TABLET: 8.6; 5 TABLET, FILM COATED ORAL at 17:49

## 2023-01-01 RX ADMIN — SUCRALFATE 1 G: 1 SUSPENSION ORAL at 12:55

## 2023-01-01 RX ADMIN — HEPARIN SODIUM 5000 UNITS: 5000 INJECTION, SOLUTION INTRAVENOUS; SUBCUTANEOUS at 15:52

## 2023-01-01 RX ADMIN — HYDROMORPHONE HYDROCHLORIDE 0.5 MG: 1 INJECTION, SOLUTION INTRAMUSCULAR; INTRAVENOUS; SUBCUTANEOUS at 00:32

## 2023-01-01 RX ADMIN — TRAZODONE HYDROCHLORIDE 50 MG: 50 TABLET ORAL at 20:49

## 2023-01-01 RX ADMIN — ATORVASTATIN CALCIUM 80 MG: 80 TABLET, FILM COATED ORAL at 20:01

## 2023-01-01 RX ADMIN — HEPARIN SODIUM 5000 UNITS: 5000 INJECTION, SOLUTION INTRAVENOUS; SUBCUTANEOUS at 22:06

## 2023-01-01 RX ADMIN — ATORVASTATIN CALCIUM 80 MG: 80 TABLET, FILM COATED ORAL at 20:11

## 2023-01-01 RX ADMIN — OMEPRAZOLE 20 MG: 20 CAPSULE, DELAYED RELEASE ORAL at 05:07

## 2023-01-01 RX ADMIN — LORAZEPAM 3 MG: 2 INJECTION INTRAMUSCULAR; INTRAVENOUS at 20:47

## 2023-01-01 RX ADMIN — ALLOPURINOL 300 MG: 100 TABLET ORAL at 05:43

## 2023-01-01 RX ADMIN — OXYCODONE 5 MG: 5 TABLET ORAL at 14:07

## 2023-01-01 RX ADMIN — MAGNESIUM 64 MG (MAGNESIUM CHLORIDE) TABLET,DELAYED RELEASE 64 MG: at 16:50

## 2023-01-01 RX ADMIN — ATORVASTATIN CALCIUM 80 MG: 80 TABLET, FILM COATED ORAL at 21:50

## 2023-01-01 RX ADMIN — TORSEMIDE 10 MG: 20 TABLET ORAL at 05:44

## 2023-01-01 RX ADMIN — SUCRALFATE 1 G: 1 SUSPENSION ORAL at 01:14

## 2023-01-01 RX ADMIN — DRONABINOL 2.5 MG: 2.5 CAPSULE ORAL at 12:55

## 2023-01-01 RX ADMIN — ASPIRIN 81 MG: 81 TABLET, COATED ORAL at 04:47

## 2023-01-01 RX ADMIN — TAMSULOSIN HYDROCHLORIDE 0.8 MG: 0.4 CAPSULE ORAL at 17:22

## 2023-01-01 RX ADMIN — LORAZEPAM 0.5 MG: 2 INJECTION INTRAMUSCULAR; INTRAVENOUS at 22:07

## 2023-01-01 RX ADMIN — OXYCODONE 5 MG: 5 TABLET ORAL at 03:48

## 2023-01-01 RX ADMIN — ACETAMINOPHEN 650 MG: 325 TABLET, FILM COATED ORAL at 04:02

## 2023-01-01 RX ADMIN — TAMSULOSIN HYDROCHLORIDE 0.8 MG: 0.4 CAPSULE ORAL at 17:12

## 2023-01-01 RX ADMIN — TRAZODONE HYDROCHLORIDE 50 MG: 50 TABLET ORAL at 20:11

## 2023-01-01 RX ADMIN — DRONABINOL 2.5 MG: 2.5 CAPSULE ORAL at 16:35

## 2023-01-01 RX ADMIN — Medication 1 TABLET: at 04:53

## 2023-01-01 RX ADMIN — CARVEDILOL 6.25 MG: 6.25 TABLET, FILM COATED ORAL at 09:33

## 2023-01-01 RX ADMIN — PROPOFOL 50 MG: 10 INJECTION, EMULSION INTRAVENOUS at 17:15

## 2023-01-01 RX ADMIN — MAGNESIUM 64 MG (MAGNESIUM CHLORIDE) TABLET,DELAYED RELEASE 64 MG: at 05:07

## 2023-01-01 RX ADMIN — HALOPERIDOL LACTATE 2 MG: 5 INJECTION, SOLUTION INTRAMUSCULAR at 15:44

## 2023-01-01 RX ADMIN — OMEPRAZOLE 20 MG: 20 CAPSULE, DELAYED RELEASE ORAL at 05:44

## 2023-01-01 RX ADMIN — DOCUSATE SODIUM 50 MG AND SENNOSIDES 8.6 MG 2 TABLET: 8.6; 5 TABLET, FILM COATED ORAL at 06:06

## 2023-01-01 RX ADMIN — SUCRALFATE 1 G: 1 SUSPENSION ORAL at 17:34

## 2023-01-01 RX ADMIN — LORAZEPAM 0.5 MG: 2 INJECTION INTRAMUSCULAR; INTRAVENOUS at 20:32

## 2023-01-01 RX ADMIN — METOCLOPRAMIDE 10 MG: 5 INJECTION, SOLUTION INTRAMUSCULAR; INTRAVENOUS at 13:43

## 2023-01-01 RX ADMIN — OXYCODONE 5 MG: 5 TABLET ORAL at 16:49

## 2023-01-01 RX ADMIN — ATORVASTATIN CALCIUM 80 MG: 80 TABLET, FILM COATED ORAL at 22:09

## 2023-01-01 RX ADMIN — MORPHINE SULFATE 10 MG: 10 INJECTION INTRAVENOUS at 18:14

## 2023-01-01 RX ADMIN — MAGNESIUM 64 MG (MAGNESIUM CHLORIDE) TABLET,DELAYED RELEASE 64 MG: at 06:05

## 2023-01-01 RX ADMIN — LORAZEPAM 0.5 MG: 2 INJECTION INTRAMUSCULAR; INTRAVENOUS at 04:04

## 2023-01-01 RX ADMIN — ATORVASTATIN CALCIUM 80 MG: 80 TABLET, FILM COATED ORAL at 21:22

## 2023-01-01 RX ADMIN — SUCRALFATE 1 G: 1 SUSPENSION ORAL at 16:36

## 2023-01-01 RX ADMIN — TRAZODONE HYDROCHLORIDE 50 MG: 50 TABLET ORAL at 21:50

## 2023-01-01 RX ADMIN — HEPARIN SODIUM 5000 UNITS: 5000 INJECTION, SOLUTION INTRAVENOUS; SUBCUTANEOUS at 14:57

## 2023-01-01 RX ADMIN — HEPARIN SODIUM 5000 UNITS: 5000 INJECTION, SOLUTION INTRAVENOUS; SUBCUTANEOUS at 05:56

## 2023-01-01 RX ADMIN — OXYCODONE 5 MG: 5 TABLET ORAL at 14:34

## 2023-01-01 RX ADMIN — TRAZODONE HYDROCHLORIDE 50 MG: 50 TABLET ORAL at 20:09

## 2023-01-01 RX ADMIN — HALOPERIDOL LACTATE 2 MG: 5 INJECTION, SOLUTION INTRAMUSCULAR at 23:03

## 2023-01-01 RX ADMIN — SUCRALFATE 1 G: 1 SUSPENSION ORAL at 22:08

## 2023-01-01 RX ADMIN — HEPARIN SODIUM 5000 UNITS: 5000 INJECTION, SOLUTION INTRAVENOUS; SUBCUTANEOUS at 20:53

## 2023-01-01 RX ADMIN — SUCRALFATE 1 G: 1 SUSPENSION ORAL at 00:00

## 2023-01-01 RX ADMIN — SUCRALFATE 1 G: 1 SUSPENSION ORAL at 22:09

## 2023-01-01 RX ADMIN — SUCRALFATE 1 G: 1 SUSPENSION ORAL at 05:45

## 2023-01-01 RX ADMIN — HEPARIN SODIUM 5000 UNITS: 5000 INJECTION, SOLUTION INTRAVENOUS; SUBCUTANEOUS at 21:01

## 2023-01-01 RX ADMIN — MAGNESIUM 64 MG (MAGNESIUM CHLORIDE) TABLET,DELAYED RELEASE 64 MG: at 05:52

## 2023-01-01 RX ADMIN — SUCRALFATE 1 G: 1 SUSPENSION ORAL at 11:02

## 2023-01-01 RX ADMIN — LIDOCAINE 2 PATCH: 50 PATCH CUTANEOUS at 16:47

## 2023-01-01 RX ADMIN — MORPHINE SULFATE 5 MG: 10 INJECTION INTRAVENOUS at 08:04

## 2023-01-01 RX ADMIN — MAGNESIUM 64 MG (MAGNESIUM CHLORIDE) TABLET,DELAYED RELEASE 64 MG: at 17:22

## 2023-01-01 RX ADMIN — HEPARIN SODIUM 5000 UNITS: 5000 INJECTION, SOLUTION INTRAVENOUS; SUBCUTANEOUS at 23:03

## 2023-01-01 RX ADMIN — CARVEDILOL 6.25 MG: 6.25 TABLET, FILM COATED ORAL at 07:30

## 2023-01-01 RX ADMIN — HEPARIN SODIUM 5000 UNITS: 5000 INJECTION, SOLUTION INTRAVENOUS; SUBCUTANEOUS at 04:53

## 2023-01-01 RX ADMIN — HEPARIN SODIUM 5000 UNITS: 5000 INJECTION, SOLUTION INTRAVENOUS; SUBCUTANEOUS at 23:26

## 2023-01-01 RX ADMIN — MAGNESIUM 64 MG (MAGNESIUM CHLORIDE) TABLET,DELAYED RELEASE 64 MG: at 17:12

## 2023-01-01 RX ADMIN — MORPHINE SULFATE 5 MG: 10 INJECTION INTRAVENOUS at 22:19

## 2023-01-01 RX ADMIN — TAMSULOSIN HYDROCHLORIDE 0.8 MG: 0.4 CAPSULE ORAL at 17:55

## 2023-01-01 RX ADMIN — HALOPERIDOL LACTATE 2 MG: 5 INJECTION, SOLUTION INTRAMUSCULAR at 10:06

## 2023-01-01 RX ADMIN — OXYCODONE 5 MG: 5 TABLET ORAL at 23:28

## 2023-01-01 RX ADMIN — ACETAMINOPHEN 650 MG: 325 TABLET, FILM COATED ORAL at 17:23

## 2023-01-01 RX ADMIN — QUETIAPINE FUMARATE 25 MG: 25 TABLET ORAL at 20:11

## 2023-01-01 RX ADMIN — Medication 1 TABLET: at 06:08

## 2023-01-01 RX ADMIN — ATORVASTATIN CALCIUM 80 MG: 80 TABLET, FILM COATED ORAL at 22:17

## 2023-01-01 RX ADMIN — MORPHINE SULFATE 10 MG: 10 INJECTION INTRAVENOUS at 13:19

## 2023-01-01 RX ADMIN — TORSEMIDE 10 MG: 20 TABLET ORAL at 06:05

## 2023-01-01 RX ADMIN — HEPARIN SODIUM 5000 UNITS: 5000 INJECTION, SOLUTION INTRAVENOUS; SUBCUTANEOUS at 21:50

## 2023-01-01 RX ADMIN — POTASSIUM CHLORIDE 60 MEQ: 1500 TABLET, EXTENDED RELEASE ORAL at 13:57

## 2023-01-01 RX ADMIN — ALLOPURINOL 300 MG: 100 TABLET ORAL at 05:52

## 2023-01-01 RX ADMIN — SUCRALFATE 1 G: 1 SUSPENSION ORAL at 11:35

## 2023-01-01 RX ADMIN — ATORVASTATIN CALCIUM 80 MG: 80 TABLET, FILM COATED ORAL at 20:07

## 2023-01-01 RX ADMIN — ASPIRIN 81 MG: 81 TABLET, COATED ORAL at 06:05

## 2023-01-01 RX ADMIN — HEPARIN SODIUM 5000 UNITS: 5000 INJECTION, SOLUTION INTRAVENOUS; SUBCUTANEOUS at 13:58

## 2023-01-01 RX ADMIN — QUETIAPINE FUMARATE 25 MG: 25 TABLET ORAL at 21:23

## 2023-01-01 RX ADMIN — OXYCODONE 5 MG: 5 TABLET ORAL at 11:02

## 2023-01-01 RX ADMIN — LORAZEPAM 0.5 MG: 2 INJECTION INTRAMUSCULAR; INTRAVENOUS at 04:49

## 2023-01-01 RX ADMIN — TRAZODONE HYDROCHLORIDE 50 MG: 50 TABLET ORAL at 20:01

## 2023-01-01 RX ADMIN — HEPARIN SODIUM 5000 UNITS: 5000 INJECTION, SOLUTION INTRAVENOUS; SUBCUTANEOUS at 13:22

## 2023-01-01 RX ADMIN — HEPARIN SODIUM 5000 UNITS: 5000 INJECTION, SOLUTION INTRAVENOUS; SUBCUTANEOUS at 21:23

## 2023-01-01 RX ADMIN — SUCRALFATE 1 G: 1 SUSPENSION ORAL at 16:56

## 2023-01-01 RX ADMIN — TRAZODONE HYDROCHLORIDE 50 MG: 50 TABLET ORAL at 20:32

## 2023-01-01 RX ADMIN — SUCRALFATE 1 G: 1 SUSPENSION ORAL at 17:52

## 2023-01-01 RX ADMIN — ASPIRIN 81 MG: 81 TABLET, COATED ORAL at 05:07

## 2023-01-01 RX ADMIN — ATORVASTATIN CALCIUM 80 MG: 80 TABLET, FILM COATED ORAL at 20:09

## 2023-01-01 RX ADMIN — QUETIAPINE FUMARATE 25 MG: 25 TABLET ORAL at 21:50

## 2023-01-01 RX ADMIN — OMEPRAZOLE 20 MG: 20 CAPSULE, DELAYED RELEASE ORAL at 09:25

## 2023-01-01 RX ADMIN — TAMSULOSIN HYDROCHLORIDE 0.8 MG: 0.4 CAPSULE ORAL at 17:49

## 2023-01-01 RX ADMIN — POTASSIUM CHLORIDE 60 MEQ: 1500 TABLET, EXTENDED RELEASE ORAL at 11:38

## 2023-01-01 RX ADMIN — ASPIRIN 81 MG: 81 TABLET, COATED ORAL at 09:14

## 2023-01-01 RX ADMIN — LIDOCAINE 2 PATCH: 50 PATCH CUTANEOUS at 16:36

## 2023-01-01 RX ADMIN — LIDOCAINE 2 PATCH: 50 PATCH CUTANEOUS at 17:45

## 2023-01-01 RX ADMIN — ASPIRIN 81 MG: 81 TABLET, COATED ORAL at 05:52

## 2023-01-01 RX ADMIN — CARVEDILOL 6.25 MG: 6.25 TABLET, FILM COATED ORAL at 16:56

## 2023-01-01 RX ADMIN — TRAZODONE HYDROCHLORIDE 50 MG: 50 TABLET ORAL at 20:53

## 2023-01-01 RX ADMIN — HALOPERIDOL LACTATE 5 MG: 5 INJECTION, SOLUTION INTRAMUSCULAR at 03:51

## 2023-01-01 RX ADMIN — MORPHINE SULFATE 5 MG: 10 INJECTION INTRAVENOUS at 18:12

## 2023-01-01 RX ADMIN — POTASSIUM CHLORIDE 60 MEQ: 1500 TABLET, EXTENDED RELEASE ORAL at 17:22

## 2023-01-01 RX ADMIN — SUCRALFATE 1 G: 1 SUSPENSION ORAL at 11:38

## 2023-01-01 RX ADMIN — OMEPRAZOLE 20 MG: 20 CAPSULE, DELAYED RELEASE ORAL at 05:52

## 2023-01-01 RX ADMIN — ONDANSETRON HYDROCHLORIDE 4 MG: 2 SOLUTION INTRAMUSCULAR; INTRAVENOUS at 09:48

## 2023-01-01 RX ADMIN — SODIUM CHLORIDE: 9 INJECTION, SOLUTION INTRAVENOUS at 04:04

## 2023-01-01 RX ADMIN — METOPROLOL SUCCINATE 12.5 MG: 25 TABLET, EXTENDED RELEASE ORAL at 05:52

## 2023-01-01 RX ADMIN — HALOPERIDOL LACTATE 2 MG: 5 INJECTION, SOLUTION INTRAMUSCULAR at 04:07

## 2023-01-01 RX ADMIN — SUCRALFATE 1 G: 1 SUSPENSION ORAL at 17:12

## 2023-01-01 RX ADMIN — HEPARIN SODIUM 5000 UNITS: 5000 INJECTION, SOLUTION INTRAVENOUS; SUBCUTANEOUS at 05:39

## 2023-01-01 RX ADMIN — FUROSEMIDE 40 MG: 10 INJECTION, SOLUTION INTRAMUSCULAR; INTRAVENOUS at 16:54

## 2023-01-01 RX ADMIN — OXYCODONE 5 MG: 5 TABLET ORAL at 00:04

## 2023-01-01 RX ADMIN — DOCUSATE SODIUM 50 MG AND SENNOSIDES 8.6 MG 2 TABLET: 8.6; 5 TABLET, FILM COATED ORAL at 06:03

## 2023-01-01 RX ADMIN — OXYCODONE 5 MG: 5 TABLET ORAL at 05:43

## 2023-01-01 RX ADMIN — HALOPERIDOL LACTATE 2 MG: 5 INJECTION, SOLUTION INTRAMUSCULAR at 04:48

## 2023-01-01 RX ADMIN — MAGNESIUM 64 MG (MAGNESIUM CHLORIDE) TABLET,DELAYED RELEASE 64 MG: at 16:36

## 2023-01-01 RX ADMIN — SUCRALFATE 1 G: 1 SUSPENSION ORAL at 12:16

## 2023-01-01 RX ADMIN — TORSEMIDE 10 MG: 20 TABLET ORAL at 17:12

## 2023-01-01 RX ADMIN — HEPARIN SODIUM 5000 UNITS: 5000 INJECTION, SOLUTION INTRAVENOUS; SUBCUTANEOUS at 06:05

## 2023-01-01 RX ADMIN — CEFTRIAXONE SODIUM 1000 MG: 10 INJECTION, POWDER, FOR SOLUTION INTRAVENOUS at 10:06

## 2023-01-01 RX ADMIN — ALLOPURINOL 300 MG: 100 TABLET ORAL at 06:05

## 2023-01-01 RX ADMIN — OMEPRAZOLE 20 MG: 20 CAPSULE, DELAYED RELEASE ORAL at 05:53

## 2023-01-01 RX ADMIN — DRONABINOL 2.5 MG: 2.5 CAPSULE ORAL at 17:49

## 2023-01-01 RX ADMIN — Medication 1 TABLET: at 05:07

## 2023-01-01 RX ADMIN — ONDANSETRON HYDROCHLORIDE 4 MG: 2 SOLUTION INTRAMUSCULAR; INTRAVENOUS at 20:56

## 2023-01-01 RX ADMIN — SCOPALAMINE 1 PATCH: 1 PATCH, EXTENDED RELEASE TRANSDERMAL at 17:45

## 2023-01-01 RX ADMIN — SUCRALFATE 1 G: 1 SUSPENSION ORAL at 22:17

## 2023-01-01 RX ADMIN — OXYCODONE 5 MG: 5 TABLET ORAL at 17:59

## 2023-01-01 RX ADMIN — ALLOPURINOL 300 MG: 100 TABLET ORAL at 06:04

## 2023-01-01 RX ADMIN — Medication 1 TABLET: at 09:25

## 2023-01-01 RX ADMIN — METOPROLOL SUCCINATE 12.5 MG: 25 TABLET, EXTENDED RELEASE ORAL at 06:05

## 2023-01-01 RX ADMIN — SUCRALFATE 1 G: 1 SUSPENSION ORAL at 16:27

## 2023-01-01 RX ADMIN — CEFTRIAXONE SODIUM 1000 MG: 10 INJECTION, POWDER, FOR SOLUTION INTRAVENOUS at 04:07

## 2023-01-01 RX ADMIN — TAMSULOSIN HYDROCHLORIDE 0.8 MG: 0.4 CAPSULE ORAL at 17:51

## 2023-01-01 RX ADMIN — MAGNESIUM 64 MG (MAGNESIUM CHLORIDE) TABLET,DELAYED RELEASE 64 MG: at 04:46

## 2023-01-01 RX ADMIN — OMEPRAZOLE 20 MG: 20 CAPSULE, DELAYED RELEASE ORAL at 06:06

## 2023-01-01 RX ADMIN — MAGNESIUM 64 MG (MAGNESIUM CHLORIDE) TABLET,DELAYED RELEASE 64 MG: at 16:27

## 2023-01-01 RX ADMIN — DOCUSATE SODIUM 50 MG AND SENNOSIDES 8.6 MG 2 TABLET: 8.6; 5 TABLET, FILM COATED ORAL at 16:36

## 2023-01-01 RX ADMIN — TAMSULOSIN HYDROCHLORIDE 0.8 MG: 0.4 CAPSULE ORAL at 17:59

## 2023-01-01 RX ADMIN — HEPARIN SODIUM 5000 UNITS: 5000 INJECTION, SOLUTION INTRAVENOUS; SUBCUTANEOUS at 04:12

## 2023-01-01 RX ADMIN — HEPARIN SODIUM 5000 UNITS: 5000 INJECTION, SOLUTION INTRAVENOUS; SUBCUTANEOUS at 05:43

## 2023-01-01 RX ADMIN — TRAZODONE HYDROCHLORIDE 50 MG: 50 TABLET ORAL at 22:17

## 2023-01-01 RX ADMIN — METOPROLOL SUCCINATE 12.5 MG: 25 TABLET, EXTENDED RELEASE ORAL at 05:39

## 2023-01-01 RX ADMIN — LIDOCAINE HYDROCHLORIDE 30 ML: 20 SOLUTION OROPHARYNGEAL at 15:25

## 2023-01-01 RX ADMIN — ASPIRIN 81 MG: 81 TABLET, COATED ORAL at 04:52

## 2023-01-01 RX ADMIN — SUCRALFATE 1 G: 1 SUSPENSION ORAL at 02:55

## 2023-01-01 RX ADMIN — OMEPRAZOLE 20 MG: 20 CAPSULE, DELAYED RELEASE ORAL at 04:12

## 2023-01-01 RX ADMIN — MORPHINE SULFATE 5 MG: 10 INJECTION INTRAVENOUS at 11:17

## 2023-01-01 RX ADMIN — DRONABINOL 2.5 MG: 2.5 CAPSULE ORAL at 12:14

## 2023-01-01 RX ADMIN — OXYCODONE 5 MG: 5 TABLET ORAL at 05:07

## 2023-01-01 RX ADMIN — DIPHENHYDRAMINE HYDROCHLORIDE 25 MG: 25 TABLET ORAL at 01:43

## 2023-01-01 RX ADMIN — QUETIAPINE FUMARATE 25 MG: 25 TABLET ORAL at 22:09

## 2023-01-01 RX ADMIN — ASPIRIN 81 MG: 81 TABLET, COATED ORAL at 05:44

## 2023-01-01 RX ADMIN — LIDOCAINE 2 PATCH: 50 PATCH CUTANEOUS at 17:22

## 2023-01-01 RX ADMIN — HEPARIN SODIUM 5000 UNITS: 5000 INJECTION, SOLUTION INTRAVENOUS; SUBCUTANEOUS at 22:17

## 2023-01-01 RX ADMIN — OMEPRAZOLE 20 MG: 20 CAPSULE, DELAYED RELEASE ORAL at 04:47

## 2023-01-01 RX ADMIN — FUROSEMIDE 80 MG: 10 INJECTION, SOLUTION INTRAMUSCULAR; INTRAVENOUS at 12:48

## 2023-01-01 RX ADMIN — DOCUSATE SODIUM 50 MG AND SENNOSIDES 8.6 MG 2 TABLET: 8.6; 5 TABLET, FILM COATED ORAL at 04:47

## 2023-01-01 RX ADMIN — LORAZEPAM 1 MG: 2 INJECTION INTRAMUSCULAR; INTRAVENOUS at 21:09

## 2023-01-01 RX ADMIN — HEPARIN SODIUM 5000 UNITS: 5000 INJECTION, SOLUTION INTRAVENOUS; SUBCUTANEOUS at 04:07

## 2023-01-01 RX ADMIN — ASPIRIN 81 MG: 81 TABLET, COATED ORAL at 04:11

## 2023-01-01 RX ADMIN — SODIUM CHLORIDE: 9 INJECTION, SOLUTION INTRAVENOUS at 11:59

## 2023-01-01 RX ADMIN — OXYCODONE 5 MG: 5 TABLET ORAL at 20:26

## 2023-01-01 RX ADMIN — SUCRALFATE 1 G: 1 SUSPENSION ORAL at 06:06

## 2023-01-01 RX ADMIN — MAGNESIUM 64 MG (MAGNESIUM CHLORIDE) TABLET,DELAYED RELEASE 64 MG: at 05:45

## 2023-01-01 RX ADMIN — CARVEDILOL 12.5 MG: 12.5 TABLET, FILM COATED ORAL at 08:40

## 2023-01-01 RX ADMIN — OXYCODONE 5 MG: 5 TABLET ORAL at 04:20

## 2023-01-01 RX ADMIN — Medication 400 MG: at 04:11

## 2023-01-01 RX ADMIN — CARVEDILOL 6.25 MG: 6.25 TABLET, FILM COATED ORAL at 17:12

## 2023-01-01 RX ADMIN — POTASSIUM CHLORIDE 40 MEQ: 1500 TABLET, EXTENDED RELEASE ORAL at 06:47

## 2023-01-01 RX ADMIN — FUROSEMIDE 40 MG: 10 INJECTION, SOLUTION INTRAMUSCULAR; INTRAVENOUS at 05:56

## 2023-01-01 RX ADMIN — HALOPERIDOL LACTATE 2 MG: 5 INJECTION, SOLUTION INTRAMUSCULAR at 12:48

## 2023-01-01 RX ADMIN — ATORVASTATIN CALCIUM 80 MG: 80 TABLET, FILM COATED ORAL at 20:32

## 2023-01-01 RX ADMIN — TRAZODONE HYDROCHLORIDE 50 MG: 50 TABLET ORAL at 20:07

## 2023-01-01 RX ADMIN — ALLOPURINOL 300 MG: 100 TABLET ORAL at 04:47

## 2023-01-01 RX ADMIN — HEPARIN SODIUM 5000 UNITS: 5000 INJECTION, SOLUTION INTRAVENOUS; SUBCUTANEOUS at 05:07

## 2023-01-01 RX ADMIN — OXYCODONE 5 MG: 5 TABLET ORAL at 23:03

## 2023-01-01 RX ADMIN — TAMSULOSIN HYDROCHLORIDE 0.8 MG: 0.4 CAPSULE ORAL at 16:49

## 2023-01-01 RX ADMIN — POTASSIUM CHLORIDE 40 MEQ: 1500 TABLET, EXTENDED RELEASE ORAL at 08:44

## 2023-01-01 RX ADMIN — SODIUM CHLORIDE 1000 ML: 9 INJECTION, SOLUTION INTRAVENOUS at 18:25

## 2023-01-01 RX ADMIN — OXYCODONE 5 MG: 5 TABLET ORAL at 20:24

## 2023-01-01 RX ADMIN — DOCUSATE SODIUM 50 MG AND SENNOSIDES 8.6 MG 2 TABLET: 8.6; 5 TABLET, FILM COATED ORAL at 05:07

## 2023-01-01 RX ADMIN — SUCRALFATE 1 G: 1 SUSPENSION ORAL at 23:04

## 2023-01-01 RX ADMIN — LORAZEPAM 1 MG: 2 INJECTION INTRAMUSCULAR; INTRAVENOUS at 06:14

## 2023-01-01 RX ADMIN — Medication 400 MG: at 20:36

## 2023-01-01 RX ADMIN — ACETAMINOPHEN 650 MG: 325 TABLET, FILM COATED ORAL at 04:12

## 2023-01-01 RX ADMIN — CARVEDILOL 6.25 MG: 6.25 TABLET, FILM COATED ORAL at 16:36

## 2023-01-01 RX ADMIN — HEPARIN SODIUM 5000 UNITS: 5000 INJECTION, SOLUTION INTRAVENOUS; SUBCUTANEOUS at 06:03

## 2023-01-01 RX ADMIN — HALOPERIDOL LACTATE 2.5 MG: 5 INJECTION, SOLUTION INTRAMUSCULAR at 01:02

## 2023-01-01 RX ADMIN — Medication 1 TABLET: at 05:44

## 2023-01-01 RX ADMIN — MORPHINE SULFATE 5 MG: 10 INJECTION INTRAVENOUS at 04:16

## 2023-01-01 RX ADMIN — TRAZODONE HYDROCHLORIDE 50 MG: 50 TABLET ORAL at 21:22

## 2023-01-01 RX ADMIN — LIDOCAINE 2 PATCH: 50 PATCH CUTANEOUS at 18:48

## 2023-01-01 RX ADMIN — DRONABINOL 2.5 MG: 2.5 CAPSULE ORAL at 16:27

## 2023-01-01 RX ADMIN — QUETIAPINE FUMARATE 25 MG: 25 TABLET ORAL at 07:50

## 2023-01-01 RX ADMIN — SODIUM CHLORIDE: 9 INJECTION, SOLUTION INTRAVENOUS at 04:59

## 2023-01-01 RX ADMIN — OXYCODONE 5 MG: 5 TABLET ORAL at 04:11

## 2023-01-01 RX ADMIN — DOCUSATE SODIUM 50 MG AND SENNOSIDES 8.6 MG 2 TABLET: 8.6; 5 TABLET, FILM COATED ORAL at 04:12

## 2023-01-01 RX ADMIN — DOCUSATE SODIUM 50 MG AND SENNOSIDES 8.6 MG 2 TABLET: 8.6; 5 TABLET, FILM COATED ORAL at 05:53

## 2023-01-01 RX ADMIN — METOPROLOL SUCCINATE 12.5 MG: 25 TABLET, EXTENDED RELEASE ORAL at 04:11

## 2023-01-01 RX ADMIN — QUETIAPINE FUMARATE 25 MG: 25 TABLET ORAL at 20:07

## 2023-01-01 RX ADMIN — DOCUSATE SODIUM 50 MG AND SENNOSIDES 8.6 MG 2 TABLET: 8.6; 5 TABLET, FILM COATED ORAL at 17:22

## 2023-01-01 RX ADMIN — QUETIAPINE FUMARATE 25 MG: 25 TABLET ORAL at 22:17

## 2023-01-01 RX ADMIN — SUCRALFATE 1 G: 1 SUSPENSION ORAL at 05:07

## 2023-01-01 RX ADMIN — Medication 1 TABLET: at 08:43

## 2023-01-01 RX ADMIN — HEPARIN SODIUM 5000 UNITS: 5000 INJECTION, SOLUTION INTRAVENOUS; SUBCUTANEOUS at 15:40

## 2023-01-01 RX ADMIN — MAGNESIUM 64 MG (MAGNESIUM CHLORIDE) TABLET,DELAYED RELEASE 64 MG: at 17:49

## 2023-01-01 RX ADMIN — TAMSULOSIN HYDROCHLORIDE 0.8 MG: 0.4 CAPSULE ORAL at 16:28

## 2023-01-01 RX ADMIN — ALLOPURINOL 300 MG: 100 TABLET ORAL at 04:51

## 2023-01-01 RX ADMIN — ATORVASTATIN CALCIUM 80 MG: 80 TABLET, FILM COATED ORAL at 20:49

## 2023-01-01 RX ADMIN — CARVEDILOL 6.25 MG: 6.25 TABLET, FILM COATED ORAL at 08:01

## 2023-01-01 RX ADMIN — SUCRALFATE 1 G: 1 SUSPENSION ORAL at 12:05

## 2023-01-01 RX ADMIN — ATORVASTATIN CALCIUM 80 MG: 80 TABLET, FILM COATED ORAL at 20:53

## 2023-01-01 RX ADMIN — HALOPERIDOL LACTATE 2 MG: 5 INJECTION, SOLUTION INTRAMUSCULAR at 10:23

## 2023-01-01 RX ADMIN — HEPARIN SODIUM 5000 UNITS: 5000 INJECTION, SOLUTION INTRAVENOUS; SUBCUTANEOUS at 15:01

## 2023-01-01 RX ADMIN — HALOPERIDOL LACTATE 2 MG: 5 INJECTION, SOLUTION INTRAMUSCULAR at 22:19

## 2023-01-01 RX ADMIN — SUCRALFATE 1 G: 1 SUSPENSION ORAL at 20:35

## 2023-01-01 RX ADMIN — ALLOPURINOL 300 MG: 100 TABLET ORAL at 05:39

## 2023-01-01 ASSESSMENT — ENCOUNTER SYMPTOMS
FEVER: 0
PSYCHIATRIC NEGATIVE: 1
EYES NEGATIVE: 1
FALLS: 1
FALLS: 1
BACK PAIN: 0
CARDIOVASCULAR NEGATIVE: 1
FALLS: 1
WEAKNESS: 1
ABDOMINAL PAIN: 1
WEAKNESS: 1
BACK PAIN: 0
RESPIRATORY NEGATIVE: 1
FEVER: 0
ABDOMINAL PAIN: 1
RESPIRATORY NEGATIVE: 1
ABDOMINAL PAIN: 1
MYALGIAS: 0
CARDIOVASCULAR NEGATIVE: 1
PSYCHIATRIC NEGATIVE: 1
BACK PAIN: 1
FALLS: 1
FEVER: 0
EYES NEGATIVE: 1
EYES NEGATIVE: 1
FEVER: 0
GASTROINTESTINAL NEGATIVE: 1
WEAKNESS: 1
RESPIRATORY NEGATIVE: 1
PSYCHIATRIC NEGATIVE: 1
BACK PAIN: 0
EYES NEGATIVE: 1
BACK PAIN: 0
FEVER: 0
RESPIRATORY NEGATIVE: 1
PSYCHIATRIC NEGATIVE: 1
BACK PAIN: 0
CHILLS: 0
BACK PAIN: 0
WEAKNESS: 1
RESPIRATORY NEGATIVE: 1
FEVER: 0
CHILLS: 0
MYALGIAS: 0
MYALGIAS: 0
FALLS: 1
CHILLS: 0
WEAKNESS: 1
MYALGIAS: 1
EYES NEGATIVE: 1
RESPIRATORY NEGATIVE: 1
EYES NEGATIVE: 1
CHILLS: 0
PSYCHIATRIC NEGATIVE: 1
CHILLS: 0
FEVER: 0
EYES NEGATIVE: 1
RESPIRATORY NEGATIVE: 1
CHILLS: 0
FALLS: 1
PSYCHIATRIC NEGATIVE: 1
CARDIOVASCULAR NEGATIVE: 1
MYALGIAS: 0
PSYCHIATRIC NEGATIVE: 1
FALLS: 1
ABDOMINAL PAIN: 1
ABDOMINAL PAIN: 1
FALLS: 1
FEVER: 0
CARDIOVASCULAR NEGATIVE: 1
RESPIRATORY NEGATIVE: 1
CARDIOVASCULAR NEGATIVE: 1
WEAKNESS: 1
CHILLS: 0
CARDIOVASCULAR NEGATIVE: 1
BACK PAIN: 0
CHILLS: 0
PSYCHIATRIC NEGATIVE: 1
MYALGIAS: 0
ABDOMINAL PAIN: 1
ABDOMINAL PAIN: 1
EYES NEGATIVE: 1
MYALGIAS: 0
GASTROINTESTINAL NEGATIVE: 1
MYALGIAS: 0
BACK PAIN: 0
FALLS: 1
WEAKNESS: 1
FEVER: 0
WEAKNESS: 1
CHILLS: 0
WEAKNESS: 1
MYALGIAS: 0
PSYCHIATRIC NEGATIVE: 1
RESPIRATORY NEGATIVE: 1
EYES NEGATIVE: 1

## 2023-01-01 ASSESSMENT — COGNITIVE AND FUNCTIONAL STATUS - GENERAL
PERSONAL GROOMING: A LOT
MOBILITY SCORE: 14
MOVING TO AND FROM BED TO CHAIR: A LOT
DRESSING REGULAR UPPER BODY CLOTHING: A LOT
HELP NEEDED FOR BATHING: A LOT
DAILY ACTIVITIY SCORE: 18
SUGGESTED CMS G CODE MODIFIER DAILY ACTIVITY: CK
MOVING TO AND FROM BED TO CHAIR: A LOT
WALKING IN HOSPITAL ROOM: A LITTLE
DAILY ACTIVITIY SCORE: 15
SUGGESTED CMS G CODE MODIFIER MOBILITY: CK
MOVING TO AND FROM BED TO CHAIR: UNABLE
DRESSING REGULAR LOWER BODY CLOTHING: TOTAL
STANDING UP FROM CHAIR USING ARMS: TOTAL
TOILETING: A LOT
SUGGESTED CMS G CODE MODIFIER MOBILITY: CL
PERSONAL GROOMING: A LITTLE
TURNING FROM BACK TO SIDE WHILE IN FLAT BAD: A LITTLE
HELP NEEDED FOR BATHING: A LOT
DRESSING REGULAR UPPER BODY CLOTHING: A LITTLE
CLIMB 3 TO 5 STEPS WITH RAILING: TOTAL
MOBILITY SCORE: 11
CLIMB 3 TO 5 STEPS WITH RAILING: TOTAL
MOVING FROM LYING ON BACK TO SITTING ON SIDE OF FLAT BED: A LITTLE
DRESSING REGULAR LOWER BODY CLOTHING: A LOT
WALKING IN HOSPITAL ROOM: TOTAL
STANDING UP FROM CHAIR USING ARMS: A LITTLE
MOBILITY SCORE: 15
TOILETING: A LOT
STANDING UP FROM CHAIR USING ARMS: A LITTLE
MOBILITY SCORE: 16
SUGGESTED CMS G CODE MODIFIER DAILY ACTIVITY: CL
TURNING FROM BACK TO SIDE WHILE IN FLAT BAD: A LOT
CLIMB 3 TO 5 STEPS WITH RAILING: A LOT
CLIMB 3 TO 5 STEPS WITH RAILING: A LOT
HELP NEEDED FOR BATHING: A LOT
MOVING FROM LYING ON BACK TO SITTING ON SIDE OF FLAT BED: A LITTLE
TOILETING: A LOT
CLIMB 3 TO 5 STEPS WITH RAILING: A LITTLE
MOBILITY SCORE: 13
SUGGESTED CMS G CODE MODIFIER DAILY ACTIVITY: CK
DAILY ACTIVITIY SCORE: 13
WALKING IN HOSPITAL ROOM: A LITTLE
MOVING TO AND FROM BED TO CHAIR: A LOT
MOVING FROM LYING ON BACK TO SITTING ON SIDE OF FLAT BED: UNABLE
SUGGESTED CMS G CODE MODIFIER MOBILITY: CL
MOVING FROM LYING ON BACK TO SITTING ON SIDE OF FLAT BED: A LOT
PERSONAL GROOMING: A LOT
DRESSING REGULAR LOWER BODY CLOTHING: TOTAL
CLIMB 3 TO 5 STEPS WITH RAILING: TOTAL
DAILY ACTIVITIY SCORE: 13
TURNING FROM BACK TO SIDE WHILE IN FLAT BAD: UNABLE
HELP NEEDED FOR BATHING: A LOT
SUGGESTED CMS G CODE MODIFIER MOBILITY: CL
EATING MEALS: A LITTLE
WALKING IN HOSPITAL ROOM: A LITTLE
WALKING IN HOSPITAL ROOM: A LITTLE
DRESSING REGULAR LOWER BODY CLOTHING: A LOT
TURNING FROM BACK TO SIDE WHILE IN FLAT BAD: A LITTLE
WALKING IN HOSPITAL ROOM: A LITTLE
MOVING FROM LYING ON BACK TO SITTING ON SIDE OF FLAT BED: A LOT
SUGGESTED CMS G CODE MODIFIER MOBILITY: CL
SUGGESTED CMS G CODE MODIFIER DAILY ACTIVITY: CL
STANDING UP FROM CHAIR USING ARMS: A LOT
MOBILITY SCORE: 11
SUGGESTED CMS G CODE MODIFIER MOBILITY: CK
DRESSING REGULAR UPPER BODY CLOTHING: A LITTLE
MOVING TO AND FROM BED TO CHAIR: A LITTLE
TOILETING: A LOT
EATING MEALS: A LITTLE
TURNING FROM BACK TO SIDE WHILE IN FLAT BAD: A LITTLE
TURNING FROM BACK TO SIDE WHILE IN FLAT BAD: A LOT
STANDING UP FROM CHAIR USING ARMS: A LITTLE
MOVING TO AND FROM BED TO CHAIR: A LOT
STANDING UP FROM CHAIR USING ARMS: A LITTLE
MOVING FROM LYING ON BACK TO SITTING ON SIDE OF FLAT BED: A LOT

## 2023-01-01 ASSESSMENT — PAIN DESCRIPTION - PAIN TYPE
TYPE: ACUTE PAIN
TYPE: ACUTE PAIN;CHRONIC PAIN
TYPE: ACUTE PAIN
TYPE: CHRONIC PAIN
TYPE: ACUTE PAIN
TYPE: ACUTE PAIN;CHRONIC PAIN
TYPE: ACUTE PAIN
TYPE: CHRONIC PAIN
TYPE: ACUTE PAIN
TYPE: ACUTE PAIN
TYPE: CHRONIC PAIN
TYPE: ACUTE PAIN
TYPE: ACUTE PAIN;CHRONIC PAIN
TYPE: ACUTE PAIN
TYPE: ACUTE PAIN;CHRONIC PAIN
TYPE: ACUTE PAIN
TYPE: ACUTE PAIN
TYPE: CHRONIC PAIN
TYPE: ACUTE PAIN
TYPE: ACUTE PAIN
TYPE: CHRONIC PAIN
TYPE: ACUTE PAIN
TYPE: ACUTE PAIN;CHRONIC PAIN
TYPE: ACUTE PAIN
TYPE: ACUTE PAIN
TYPE: CHRONIC PAIN
TYPE: ACUTE PAIN

## 2023-01-01 ASSESSMENT — LIFESTYLE VARIABLES
ON A TYPICAL DAY WHEN YOU DRINK ALCOHOL HOW MANY DRINKS DO YOU HAVE: 0
HAVE PEOPLE ANNOYED YOU BY CRITICIZING YOUR DRINKING: NO
TOTAL SCORE: 0
EVER HAD A DRINK FIRST THING IN THE MORNING TO STEADY YOUR NERVES TO GET RID OF A HANGOVER: NO
HAVE YOU EVER FELT YOU SHOULD CUT DOWN ON YOUR DRINKING: NO
HOW MANY TIMES IN THE PAST YEAR HAVE YOU HAD 5 OR MORE DRINKS IN A DAY: 0
TOTAL SCORE: 0
TOTAL SCORE: 0
ALCOHOL_USE: NO
CONSUMPTION TOTAL: NEGATIVE
AVERAGE NUMBER OF DAYS PER WEEK YOU HAVE A DRINK CONTAINING ALCOHOL: 0
EVER FELT BAD OR GUILTY ABOUT YOUR DRINKING: NO

## 2023-01-01 ASSESSMENT — PATIENT HEALTH QUESTIONNAIRE - PHQ9
SUM OF ALL RESPONSES TO PHQ9 QUESTIONS 1 AND 2: 2
SUM OF ALL RESPONSES TO PHQ9 QUESTIONS 1 AND 2: 1
SUM OF ALL RESPONSES TO PHQ9 QUESTIONS 1 AND 2: 0
3. TROUBLE FALLING OR STAYING ASLEEP OR SLEEPING TOO MUCH: SEVERAL DAYS
1. LITTLE INTEREST OR PLEASURE IN DOING THINGS: NOT AT ALL
6. FEELING BAD ABOUT YOURSELF - OR THAT YOU ARE A FAILURE OR HAVE LET YOURSELF OR YOUR FAMILY DOWN: NOT AL ALL
7. TROUBLE CONCENTRATING ON THINGS, SUCH AS READING THE NEWSPAPER OR WATCHING TELEVISION: NOT AT ALL
1. LITTLE INTEREST OR PLEASURE IN DOING THINGS: SEVERAL DAYS
1. LITTLE INTEREST OR PLEASURE IN DOING THINGS: MORE THAN HALF THE DAYS
6. FEELING BAD ABOUT YOURSELF - OR THAT YOU ARE A FAILURE OR HAVE LET YOURSELF OR YOUR FAMILY DOWN: NOT AL ALL
1. LITTLE INTEREST OR PLEASURE IN DOING THINGS: SEVERAL DAYS
SUM OF ALL RESPONSES TO PHQ QUESTIONS 1-9: 5
8. MOVING OR SPEAKING SO SLOWLY THAT OTHER PEOPLE COULD HAVE NOTICED. OR THE OPPOSITE, BEING SO FIGETY OR RESTLESS THAT YOU HAVE BEEN MOVING AROUND A LOT MORE THAN USUAL: SEVERAL DAYS
SUM OF ALL RESPONSES TO PHQ9 QUESTIONS 1 AND 2: 1
7. TROUBLE CONCENTRATING ON THINGS, SUCH AS READING THE NEWSPAPER OR WATCHING TELEVISION: NOT AT ALL
5. POOR APPETITE OR OVEREATING: MORE THAN HALF THE DAYS
5. POOR APPETITE OR OVEREATING: NOT AT ALL
4. FEELING TIRED OR HAVING LITTLE ENERGY: SEVERAL DAYS
9. THOUGHTS THAT YOU WOULD BE BETTER OFF DEAD, OR OF HURTING YOURSELF: NOT AT ALL
SUM OF ALL RESPONSES TO PHQ QUESTIONS 1-9: 4
2. FEELING DOWN, DEPRESSED, IRRITABLE, OR HOPELESS: NOT AT ALL
1. LITTLE INTEREST OR PLEASURE IN DOING THINGS: NOT AT ALL
2. FEELING DOWN, DEPRESSED, IRRITABLE, OR HOPELESS: NOT AT ALL
SUM OF ALL RESPONSES TO PHQ9 QUESTIONS 1 AND 2: 0
9. THOUGHTS THAT YOU WOULD BE BETTER OFF DEAD, OR OF HURTING YOURSELF: NOT AT ALL
3. TROUBLE FALLING OR STAYING ASLEEP OR SLEEPING TOO MUCH: SEVERAL DAYS
2. FEELING DOWN, DEPRESSED, IRRITABLE, OR HOPELESS: NOT AT ALL
4. FEELING TIRED OR HAVING LITTLE ENERGY: SEVERAL DAYS
8. MOVING OR SPEAKING SO SLOWLY THAT OTHER PEOPLE COULD HAVE NOTICED. OR THE OPPOSITE, BEING SO FIGETY OR RESTLESS THAT YOU HAVE BEEN MOVING AROUND A LOT MORE THAN USUAL: NOT AT ALL
2. FEELING DOWN, DEPRESSED, IRRITABLE, OR HOPELESS: NOT AT ALL

## 2023-01-01 ASSESSMENT — GAIT ASSESSMENTS
DISTANCE (FEET): 40
ASSISTIVE DEVICE: FRONT WHEEL WALKER
ASSISTIVE DEVICE: FRONT WHEEL WALKER
GAIT LEVEL OF ASSIST: CONTACT GUARD ASSIST
GAIT LEVEL OF ASSIST: CONTACT GUARD ASSIST
ASSISTIVE DEVICE: FRONT WHEEL WALKER
GAIT LEVEL OF ASSIST: CONTACT GUARD ASSIST
DEVIATION: BRADYKINETIC
DEVIATION: BRADYKINETIC
ASSISTIVE DEVICE: FRONT WHEEL WALKER
GAIT LEVEL OF ASSIST: CONTACT GUARD ASSIST
DEVIATION: BRADYKINETIC
DISTANCE (FEET): 60
DISTANCE (FEET): 60
DISTANCE (FEET): 70

## 2023-01-01 ASSESSMENT — ACTIVITIES OF DAILY LIVING (ADL): TOILETING: INDEPENDENT

## 2023-01-01 ASSESSMENT — FIBROSIS 4 INDEX
FIB4 SCORE: 1.6
FIB4 SCORE: 2.23
FIB4 SCORE: 1.7
FIB4 SCORE: 2.39
FIB4 SCORE: 2.11
FIB4 SCORE: 2.23
FIB4 SCORE: 1.3

## 2023-01-01 ASSESSMENT — PAIN SCALES - PAIN ASSESSMENT IN ADVANCED DEMENTIA (PAINAD)
BODYLANGUAGE: RELAXED
BREATHING: NORMAL
CONSOLABILITY: NO NEED TO CONSOLE
BREATHING: NORMAL
BODYLANGUAGE: RELAXED

## 2023-04-24 PROBLEM — R42 ORTHOSTATIC DIZZINESS: Status: ACTIVE | Noted: 2023-01-01
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
## 2023-04-24 PROBLEM — N17.9 AKI (ACUTE KIDNEY INJURY) (HCC): Status: ACTIVE | Noted: 2023-01-01

## 2023-04-24 NOTE — ED PROVIDER NOTES
ED Provider Note    CHIEF COMPLAINT  Chief Complaint   Patient presents with    T-5000 GLF     Pt experienced a MGLF today, L hip pain with inward rotation. +pulses and sensation, inability to move extremity       EXTERNAL RECORDS REVIEWED  Reviewed outpatient clinic visits cardiology notes    HPI/ROS  LIMITATION TO HISTORY   None  OUTSIDE HISTORIAN(S):  EMS provided additional history for evaluation of mechanical slip and fall with left hip pain.  The patient is a very pleasant      Taran Buchananer is a 82 y.o. male who presents in healthy 82-year-old gentleman.  He has a known history of the left hip replacement.  He reports he dislocated it several years ago and another state and underwent closed reduction that was around 5 to 6 years ago.  He has been doing well ever since.  He reports pain in the left hip with specifically no injury to the head neck chest abdomen or pelvis.  No numbness tingling or weakness.  No preceding chest pain palpitation shortness of breath and for strokelike symptoms.  No chest pain.  He reports 10 out of 10 pain in the left hip.  Paramedics administered fentanyl prior to arrival    PAST MEDICAL HISTORY   has a past medical history of Atrial fibrillation (HCC) (2012), Benign essential hypertension (2012), CAD (coronary artery disease), native coronary artery (2012), Chronic anticoagulation (2012), GOUT (2012), and PVC's (premature ventricular contractions) (2012).    SURGICAL HISTORY   has a past surgical history that includes other cardiac surgery.  Left hip replacement  FAMILY HISTORY  No family history on file.  Noncontributory  SOCIAL HISTORY  Social History     Tobacco Use    Smoking status: Former     Years: 20.00     Types: Cigarettes     Quit date: 1978     Years since quittin.4    Smokeless tobacco: Never   Substance and Sexual Activity    Alcohol use: Yes     Comment: occasional    Drug use: No    Sexual activity: Not on file     No drug or  alcohol abuse  CURRENT MEDICATIONS  Home Medications       Reviewed by Zenia Ibarra R.N. (Registered Nurse) on 04/24/23 at 1520  Med List Status: Not Addressed     Medication Last Dose Status   allopurinol (ZYLOPRIM) 300 MG TABS  Active   aspirin 81 MG tablet  Active   benazepril (LOTENSIN) 10 MG TABS  Active   Cyanocobalamin (VITAMIN B 12 PO)  Active   gabapentin (NEURONTIN) 300 MG CAPS  Active   metoprolol (LOPRESSOR) 25 MG TABS  Active   ondansetron (ZOFRAN ODT) 4 MG TABLET DISPERSIBLE  Active   vitamin D (CHOLECALCIFEROL) 1000 UNIT TABS  Active                    ALLERGIES  No Known Allergies    PHYSICAL EXAM  VITAL SIGNS: /53   Pulse (!) 51   Temp 36.9 °C (98.4 °F) (Temporal)   Resp 12   Ht 1.829 m (6')   Wt 96.6 kg (213 lb)   SpO2 91%   BMI 28.89 kg/m²    Pulse ox interpretation: I interpret this pulse ox as normal.  Constitutional: Alert and oriented x 3, moderate distress  HEENT: Atraumatic normocephalic, pupils are equal round reactive to light extraocular movements are intact. The nares is clear, external ears are normal, mouth shows dry mucous membranes normal dentition for age  Neck: Supple, no JVD no tracheal deviation  Cardiovascular: Regular rate and rhythm no murmur rub or gallop 2+ pulses peripherally x4  Thorax & Lungs: No respiratory distress, no wheezes rales or rhonchi, No chest tenderness.   GI: Soft nontender nondistended positive bowel sounds, no peritoneal signs  Skin: Warm dry no acute rash or lesion  Musculoskeletal: Extremity exam is notable for no pain or deformity in the upper extremities.  Left lower extremity is notable for exquisite tenderness noted over the left lateral hip and greater trochanter with internal rotation.  Right lower extremity is normal.  Left lower extremity has a normal neurovascular exam compartments of the leg are soft dorsalis pedal pulses intact and sensation is normal   neurologic: Cranial nerves III through XII are grossly intact no sensory  deficit no cerebellar dysfunction   Psychiatric: Appropriate affect for situation at this time      Results for orders placed or performed during the hospital encounter of 04/24/23   CBC WITH DIFFERENTIAL   Result Value Ref Range    WBC 6.5 4.8 - 10.8 K/uL    RBC 3.10 (L) 4.70 - 6.10 M/uL    Hemoglobin 9.9 (L) 14.0 - 18.0 g/dL    Hematocrit 31.1 (L) 42.0 - 52.0 %    .3 (H) 81.4 - 97.8 fL    MCH 31.9 27.0 - 33.0 pg    MCHC 31.8 (L) 33.7 - 35.3 g/dL    RDW 49.8 35.9 - 50.0 fL    Platelet Count 129 (L) 164 - 446 K/uL    MPV 9.4 9.0 - 12.9 fL    Neutrophils-Polys 69.40 44.00 - 72.00 %    Lymphocytes 17.40 (L) 22.00 - 41.00 %    Monocytes 9.20 0.00 - 13.40 %    Eosinophils 3.20 0.00 - 6.90 %    Basophils 0.20 0.00 - 1.80 %    Immature Granulocytes 0.60 0.00 - 0.90 %    Nucleated RBC 0.00 /100 WBC    Neutrophils (Absolute) 4.50 1.82 - 7.42 K/uL    Lymphs (Absolute) 1.13 1.00 - 4.80 K/uL    Monos (Absolute) 0.60 0.00 - 0.85 K/uL    Eos (Absolute) 0.21 0.00 - 0.51 K/uL    Baso (Absolute) 0.01 0.00 - 0.12 K/uL    Immature Granulocytes (abs) 0.04 0.00 - 0.11 K/uL    NRBC (Absolute) 0.00 K/uL   Comp Metabolic Panel   Result Value Ref Range    Sodium 140 135 - 145 mmol/L    Potassium 4.3 3.6 - 5.5 mmol/L    Chloride 105 96 - 112 mmol/L    Co2 22 20 - 33 mmol/L    Anion Gap 13.0 7.0 - 16.0    Glucose 115 (H) 65 - 99 mg/dL    Bun 54 (H) 8 - 22 mg/dL    Creatinine 2.82 (H) 0.50 - 1.40 mg/dL    Calcium 8.3 (L) 8.5 - 10.5 mg/dL    AST(SGOT) 15 12 - 45 U/L    ALT(SGPT) 21 2 - 50 U/L    Alkaline Phosphatase 88 30 - 99 U/L    Total Bilirubin 0.3 0.1 - 1.5 mg/dL    Albumin 3.3 3.2 - 4.9 g/dL    Total Protein 5.5 (L) 6.0 - 8.2 g/dL    Globulin 2.2 1.9 - 3.5 g/dL    A-G Ratio 1.5 g/dL   Prothrombin Time   Result Value Ref Range    PT 15.6 (H) 12.0 - 14.6 sec    INR 1.26 (H) 0.87 - 1.13   APTT   Result Value Ref Range    APTT 24.8 24.7 - 36.0 sec   CORRECTED CALCIUM   Result Value Ref Range    Correct Calcium 8.9 8.5 - 10.5 mg/dL    ESTIMATED GFR   Result Value Ref Range    GFR (CKD-EPI) 21 (A) >60 mL/min/1.73 m 2        DIAGNOSTIC STUDIES / PROCEDURES  Physician procedure: Conscious sedation and closed reduction of dislocated left hip.  Verbal and written consent was obtained.  The patient is ASA classification 3.  He has been nothing by mouth for 6 hours prior to the procedure.  Respiratory tech and nursing staff was on site during the procedure.  Timeout was called.  Patient was on supplemental oxygen as well as continuous pulse oximetry end-tidal CO2 monitoring and cardiac monitoring.  Patient was given a total of 50 mg of IV propofol.  Using Captain Moyer's technique the hip was reduced on the first attempt without complication.  I stayed in the room for an additional 15 minutes while the patient recovered from sedation no complications.    RADIOLOGY  I have independently interpreted the diagnostic imaging associated with this visit and am waiting the final reading from the radiologist.   My preliminary interpretation is as follows: Dislocation of left hip arthroplasty without fracture  Radiologist interpretation:   DX-PELVIS-1 OR 2 VIEWS   Final Result      1.  There is a superior left hip arthroplasty dislocation.      DX-CHEST-PORTABLE (1 VIEW)   Final Result      1.  Left basilar opacity which may represent consolidation or contusion with probable small effusion versus pleural thickening.   2.  Moderate enlargement of the cardiomediastinal silhouette.      DX-FEMUR-2+ LEFT   Final Result      1.  No acute displaced fracture of the left femur.   2.  There is a left superior hip arthroplasty dislocation.      DX-PELVIS-1 OR 2 VIEWS    (Results Pending)       COURSE & MEDICAL DECISION MAKING    ED Observation Status? Yes; I am placing the patient in to an observation status due to a diagnostic uncertainty as well as therapeutic intensity. Patient placed in observation status at 3:27 PM, 4/24/2023.     Observation plan is as follows:  Administer pain meds, perform diagnostic blood tests and imaging studies.  Perform closed reduction and conscious sedation.  Perform postreduction radiograph    Upon Reevaluation, the patient's condition has: Improved; and will be discharged.    Patient discharged from ED Observation status at 1755 (Time) 4/24/23 (Date).     INITIAL ASSESSMENT, COURSE AND PLAN  Care Narrative:   This is a very pleasant 82-year-old gentleman who presents here after mechanical slip and fall.  Fortunately he did not have any evidence of head or neck or thoracic trauma.  He had an isolated left hip injury consistent with a superior dislocation.  Laboratory studies including CBC and metabolic panel were performed as a thought he could actually have a hip fracture.  There was significant delay getting his metabolic panel back as the lab analyzer was down.  He does have evidence of a profound elevation in BUN and creatinine consistent with TAMMI.  Chest x-ray is normal as well.  Patient underwent conscious sedation and closed reduction with successful reduction.  We will place him in a knee immobilizer and discharge him into the care of his family with crutches.  We attempted to ambulate the patient he was lightheaded dizzy and with his severe dehydration and TAMMI recommend admission to the hospitalist service      ADDITIONAL PROBLEM LIST  DISPOSITION AND DISCUSSIONS  I have discussed management of the patient with the following physicians and GISSELL's:  None    Discussion of management with other [facility] or appropriate source(s): Consulted with pharmacy regarding sedative dosing    Escalation of care considered, and ultimately not performed:acute inpatient care management, however at this time, the patient is most appropriate for outpatient management    Barriers to care at this time, including but not limited to: None none    Decision tools and prescription drugs considered including, but not limited to: None    FINAL DIAGNOSIS  Acute closed left  hip prosthesis dislocation status post closed reduction with conscious sedation performed by [facility]O  Acute kidney injury, new diagnosis       Electronically signed by: Juan Alberto Stratton M.D., 4/24/2023 4:45 PM

## 2023-04-24 NOTE — ED TRIAGE NOTES
Chief Complaint   Patient presents with    T-5000 GLF     Pt experienced a MGLF today, L hip pain with inward rotation. +pulses and sensation, inability to move extremity       BIB [facility] to [facility], pt on monitor and in gown, labs drawn and sent.   Pt Reports a MGLF today, denies head strike, -thinners. Pt has inward rotation of LLE, painful to move extremity, +pulses and sensation. Pt had total hip replacement done on L hip in 2018.  Pt arrives [facility], pt is on 2L o2 d/t pain medication from EMS    Medications given en route:100 mcg Fentanyl    Vitals:    04/24/23 1518   BP: 116/56   Pulse: (!) 51   Resp: 16   Temp: 36.4 °C (97.6 °F)   SpO2: 96%

## 2023-04-25 PROBLEM — S73.005A HIP DISLOCATION, LEFT (HCC): Status: ACTIVE | Noted: 2023-01-01

## 2023-04-25 NOTE — ASSESSMENT & PLAN NOTE
Noted on XRAY     1.  No acute displaced fracture of the left femur.  2.  There is a left superior hip arthroplasty dislocation.    -Continue PT/OT  snf recommended.

## 2023-04-25 NOTE — ED NOTES
Transported to 14 by transport staff on [facility]. Aox4. Patient care transferred.

## 2023-04-25 NOTE — DISCHARGE PLANNING
Care Transition Team Assessment    Spoke with patient, daughter and friend at bedside with verbal consent. Verified all information. Lives with adult daughter in mobile home with 5 steps to enter and rail on left side. Address [address] PCP DR. Taylor. Has [employer] insurance. Uses cane to ambulate. Uses [facility] in [place]. Anticipate SNF when medically cleared. Family wishes to check out SNF. SNF choice form given to family. Patient not currently Medically cleared.    Information Source  Orientation Level: Oriented X4  Information Given By: Patient    Readmission Evaluation  Is this a readmission?: No    Interdisciplinary Discharge Planning  Primary Care Physician: Alfie Taylor  Lives with - Patient's Self Care Capacity: Adult Children  Patient or legal guardian wants to designate a caregiver: No  Support Systems: Children, Friends / Neighbors  Housing / Facility: Mobile Home  Do You Take your Prescribed Medications Regularly: Yes  Able to Return to Previous ADL's: Future Time w/Therapy  Mobility Issues: Yes  Prior Services: Intermittent Physical Support for ADL Per Family  Patient Prefers to be Discharged to:: SNF  Assistance Needed: Yes  Durable Medical Equipment: Other - Specify (Cane)    Discharge Preparedness  What are your discharge supports?: Child  Prior Functional Level: Uses Cane    Functional Assesment  Prior Functional Level: Uses Cane    Finances  Prescription Coverage: Yes    Discharge Risks or Barriers  Patient risk factors: Vulnerable adult    Anticipated Discharge Information  Discharge Disposition: D/T to SNF with Medicare cert in anticipation of skilled care (03)  Discharge Contact Phone Number: [phone]

## 2023-04-25 NOTE — PROGRESS NOTES
4 Eyes Skin Assessment Completed by SHRADDHA Alegria and SHRADDHA Nelson.    Head WDL  Ears WDL  Nose WDL  Mouth WDL  Neck WDL  Breast/Chest WDL  Shoulder Blades WDL  Spine Redness and Blanching  (R) Arm/Elbow/Hand WDL  (L) Arm/Elbow/Hand Bruising  Abdomen WDL  Groin WDL  Scrotum/Coccyx/Buttocks Redness and Blanching  (R) Leg WDL  (L) Leg Redness and Swelling  (R) Heel/Foot/Toe WDL  (L) Heel/Foot/Toe WDL          Devices In Places Pulse Ox      Interventions In Place Pillows    Possible Skin Injury No    Pictures Uploaded Into Epic N/A  Wound Consult Placed N/A  RN Wound Prevention Protocol Ordered No

## 2023-04-25 NOTE — ED NOTES
Report received from Elaine LLANES. Assumed care of patient. Pt assessed, AAO x 4 . Patient's concerns addressed. Patient aware of POC. Fall precautions in place.  Pt repositioned and comfortable.  Call light within reach. This RN masked and in appropriate PPE during encounter.

## 2023-04-25 NOTE — H&P
[facility] MEDICINE HISTORY AND PHYSICAL     PATIENT ID:  NAME:  Taran Ornelas  MRN:               [id number]  YOB: 1940    Date of Admission: 4/24/2023     Attending: Price Stuart M.d.    Resident: Lexx Anne MD    Primary Care Physician:  Alfie Taylor M.D.    CC: Mechanical ground-level fall    HPI: Taran Ornelas is a 82 y.o. male who presented after ground-level fall at home onto his left hip with inability to move his left hip.  Patient has past medical history significant for CAD, MI, HTN, gout, left hip and knee replacement.     Patient reports that this morning he was walking in his house when his left knee gave out having him fall on his left hip.  Patient denies any syncopal episodes, or dizziness prior to falling.  Patient does report that he was having some orthostatic episodes but he would wait for them to pass prior to walking which has been going on for few weeks.  Patient reports that he has a history of multiple falls due to his left knee not holding his weight.  He reports that when he fell this time he landed on his left hip with inability to move the left leg or get up.  Denies any head trauma, or trauma to any other parts of the body.  Did not lose consciousness    REVIEW OF SYSTEMS:   Ten systems reviewed and were negative except as noted in the HPI.                PAST MEDICAL HISTORY:  Past Medical History:   Diagnosis Date    Atrial fibrillation (HCC) 4/19/2012    Benign essential hypertension 4/19/2012    CAD (coronary artery disease), native coronary artery 4/19/2012    Chronic anticoagulation 4/19/2012    GOUT 4/19/2012    PVC's (premature ventricular contractions) 4/19/2012       PAST SURGICAL HISTORY:  Past Surgical History:   Procedure Laterality Date    OTHER CARDIAC SURGERY      aortic coarctation repair age 12       FAMILY HISTORY:  No family history on file.    SOCIAL HISTORY:   Pt lives in [place] with daughter  Smoking ex-smoker 2 packs/day x 30  years  Etoh use denies  Drug use denies    DIET:   Orders Placed This Encounter   Procedures    Diet Order Diet: Renal     Standing Status:   Standing     Number of Occurrences:   1     Order Specific Question:   Diet:     Answer:   Renal [8]       ALLERGIES:  Allergies   Allergen Reactions    Ibuprofen Unspecified     * PCP told not to take ibuprofen on 04/24/23       OUTPATIENT MEDICATIONS:    Current Facility-Administered Medications:     propofol (DIPRIVAN) 20mL vial injection (RWN), 50 mg, Intravenous, Once, Juan Alberto Stratton M.D.    [START ON 4/25/2023] allopurinol (ZYLOPRIM) tablet 300 mg, 300 mg, Oral, DAILY, Lexx Anne M.D.    ondansetron (ZOFRAN ODT) dispertab 4 mg, 4 mg, Oral, Q8HRS PRN, Lexx Anne M.D.    senna-docusate (PERICOLACE or SENOKOT S) 8.6-50 MG per tablet 2 Tablet, 2 Tablet, Oral, BID **AND** polyethylene glycol/lytes (MIRALAX) PACKET 1 Packet, 1 Packet, Oral, QDAY PRN **AND** magnesium hydroxide (MILK OF MAGNESIA) suspension 30 mL, 30 mL, Oral, QDAY PRN **AND** bisacodyl (DULCOLAX) suppository 10 mg, 10 mg, Rectal, QDAY PRN, Lexx Anne M.D.    NS infusion, , Intravenous, Continuous, Lexx Anne M.D.    acetaminophen (Tylenol) tablet 650 mg, 650 mg, Oral, Q6HRS PRN, Lexx Anne M.D.    heparin injection 5,000 Units, 5,000 Units, Subcutaneous, Q8HRS, Lexx Anne M.D.    [START ON 4/25/2023] aspirin EC (ECOTRIN) tablet 81 mg, 81 mg, Oral, DAILY, Lexx Anne M.D.    atorvastatin (LIPITOR) tablet 80 mg, 80 mg, Oral, Nightly, Lexx Anne M.D.    traZODone (DESYREL) tablet 50 mg, 50 mg, Oral, Nightly, Lexx Anne M.D.    tamsulosin (FLOMAX) capsule 0.8 mg, 0.8 mg, Oral, Q EVENING, Lexx Anne M.D.    [START ON 4/25/2023] metoprolol SR (TOPROL XL) tablet 12.5 mg, 12.5 mg, Oral, DAILY, Lexx Anne M.D.    PHYSICAL EXAM:  Vitals:    04/24/23 1925 04/24/23 1947 04/24/23 1959 04/24/23 2023   BP: 130/62 128/59 (!) 155/71 (!) 155/71   Pulse: (!) 51  (!) 57 63 63   Resp:   20    Temp:   36 °C (96.8 °F) 36 °C (96.8 °F)   TempSrc:   Temporal Oral   SpO2: 97% 94% 92% 92%   Weight:    96.6 kg (212 lb 15.4 oz)   Height:   1.829 m (6') 1.829 m (6')   , Temp (24hrs), Av.3 °C (97.4 °F), Min:36 °C (96.8 °F), Max:36.9 °C (98.4 °F)  , Pulse Oximetry: 92 %, O2 (LPM): 5, O2 Delivery Device: Nasal Cannula    General: Pt resting in NAD, cooperative   Skin:  Pink, warm and dry.  No rashes  HEENT: NC/AT. PERRL. EOMI. MMM. No nasal discharge. Oropharynx nonerythematous without exudate/plaques  Neck:  Supple without lymphadenopathy or rigidity.  Lungs:  Symmetrical.  CTAB with no adventitious breath sounds.  Good air movement nasal cannula in place 94% on 2 L during exam  Cardiovascular:  Normal S1/S2, RRR without M/R/G.  Abdomen:  BS+, Soft, NT/ND. No masses noted.  Extremities: Decreased range of motion of left lower extremity with hip abduction, no significant or ecchymosis, nontender to palpation of left hip and pelvis.  Left knee with brace in place.  No significant edema , distal pulses intact.  Spine:  Straight without vertebral anomalies.  CNS:  A&Ox4, follows commands, Strength 5/5 in all extremities.       ERCourse:  Arrival to the emergency department patient patient noted to have superior dislocation of the left hip arthroplasty.  Patient underwent propofol sedation with successful reduction of the left hip.  No additional fractures were noted.  Patient reporting significant improvement in his symptoms with reduction of the hip.    On laboratory analysis was found to have acute kidney injury creatinine of 2.82 with a BUN elevated at 54.  Patient was also slightly anemic at 9.9.    Patient was admitted to observation for fluid resuscitation and further of the TAMMI.    LAB TESTS:   Recent Labs     23  1551   WBC 6.5   RBC 3.10*   HEMOGLOBIN 9.9*   HEMATOCRIT 31.1*   .3*   MCH 31.9   RDW 49.8   PLATELETCT 129*   MPV 9.4   NEUTSPOLYS 69.40   LYMPHOCYTES  17.40*   MONOCYTES 9.20   EOSINOPHILS 3.20   BASOPHILS 0.20         Recent Labs     04/24/23  1551   SODIUM 140   POTASSIUM 4.3   CHLORIDE 105   CO2 22   BUN 54*   CREATININE 2.82*   CALCIUM 8.3*   ALBUMIN 3.3       CULTURES:   Results       ** No results found for the last 168 hours. **            IMAGES:  DX-PELVIS-1 OR 2 VIEWS   Final Result      Successful reduction of dislocated left hip arthroplasty.      DX-PELVIS-1 OR 2 VIEWS   Final Result      1.  There is a superior left hip arthroplasty dislocation.      DX-CHEST-PORTABLE (1 VIEW)   Final Result      1.  Left basilar opacity which may represent consolidation or contusion with probable small effusion versus pleural thickening.   2.  Moderate enlargement of the cardiomediastinal silhouette.      DX-FEMUR-2+ LEFT   Final Result      1.  No acute displaced fracture of the left femur.   2.  There is a left superior hip arthroplasty dislocation.            CONSULTS:   None    ASSESSMENT/PLAN: 82 y.o. male admitted for TAMMI status post left hip arthroplasty reduction..    TAMMI (acute kidney injury) (HCC)  Creatinine 2.82 BUN 54 on admission  -Unknown baseline was normal approximately 5 years ago which was last results and laboratory.  Patient reports Dr. Taylor discontinued his Benzapril today 2/2 to concerns about Kidney Function but was waiting on labs.   -Received 1 L bolus NS in ED  -Continue IVF at 125 mL/h  -Repeat CBC CMP in a.m.    Orthostatic dizziness  Patient reports a recent history of orthostatic dizziness upon standing.  Was told by his PCP to discontinue his Benzapril, as well as instructed to stand and wait 20 to 30 seconds prior to walking.  -This fall not related to orthostatic, patient reports left knee giving out while walking  -Patient's BP improved after 1 L bolus in ED  -Patient currently on tamsulosin 0.8 for BPH  -PT eval, Recommend F/U with PCP, Consider stopping Tamsulosin.     Benign essential hypertension  Patient with episode of  orthostatic hypotension in the emergency department.    -Benazepril held secondary to TAMMI  -Continue metoprolol XL 12.5 mg  -Continue atorvastatin 80 mg daily    Ground-level fall  Patient brought to ED for trauma secondary to ground-level fall with left hip arthroplasty dislocation.  Patient reports mechanical fall with left knee which is also prosthetic giving out on him.  He had this happen once before approximately 4 to 5 years ago with reduction of the same hip.  -Patient underwent successful left hip reduction in ED.  Improved pain and movement following reduction.  -Fall precautions  -PT eval  -Recommend outpatient orthopedic follow-up        Core Measures:  Fluids: NS 125ml/hr  Lines: PIV  Abx: NOne  Diet: Renal  PPX: SCD and Heparin  DISPO: CDU    CODE STATUS: Full Code      Lexx Anne MD  PGY2  [facility]

## 2023-04-25 NOTE — ASSESSMENT & PLAN NOTE
Patient with episode of orthostatic hypotension in the emergency department.    -Benazepril held secondary to TAMMI  -Continue metoprolol XL 12.5 mg  -Continue atorvastatin 80 mg daily

## 2023-04-25 NOTE — PROGRESS NOTES
Hospital Medicine Daily Progress Note    Date of Service  4/25/2023    Chief Complaint  Taran Ornelas is a 82 y.o. male admitted 4/24/2023 with ground level fall    Hospital Course  Mr. Taran Ornelas is a 82 y.o. male with a past medical history significant for CAD, MI, HTN, gout, left hip and knee replacement. who presented on 4/204/2023 after ground-level fall at home onto his left hip with inability to move his left hip.      Patient reports that he was walking in his house when his left knee gave out having him fall on his left hip.  Patient denies any syncopal episodes, or dizziness prior to falling.  Patient does report that he was having some orthostatic episodes but he would wait for them to pass prior to walking which has been going on for few weeks.  Patient reports that he has a history of multiple falls due to his left knee not holding his weight.  He reports that when he fell this time he landed on his left hip with inability to move the left leg or get up.  Denies any head trauma, or trauma to any other parts of the body.  Did not lose consciousness.    In ER, patient noted to have normal vital signs.  Notable lab findings include RBC at 3.10, hemoglobin 9.9, hematocrit 31.1, glucose 115, BUN 54, creatinine 2.82, GFR 21, calcium 8.3, INR 1.26, PT 15.6.  X-ray left femur notes no acute displaced fracture of the left femur but there is a left superior hip arthroplasty dislocation noted.  Chest x-ray notes left basilar opacity likely representative of consolidation or contusion with probable small effusion versus pleural thickening with moderate enlargement of the cardiomediastinal silhouette.  Patient admitted with hospital medicine for management of TAMMI, postacute evaluation with PT, and disposition planning.    Interval Problem Update  -Patient seen and examined.  Noted severe bruising on left leg.  Patient reports left hip pain.  Discussed with patient plan of care including continuation of hydration  due to TAMMI.  Continuation PT evaluation, more likely patient will go to a skilled facility for continued therapy  -Plan of care: Continue pain management for left hip pain; continuation IV fluids due to TAMMI; will likely need placement postacute for continued therapy  -Disposition: Patient switched to inpatient status as he is anticipated to stay 2-3 midnights for management of TAMMI, evaluation for placement for continued physical therapy due to left hip dislocation  -Lab work: Reviewed; expected  -VSS at this time    I have discussed this patient's plan of care and discharge plan at IDT rounds today with Case Management, Nursing, Nursing leadership, and other members of the IDT team.    Consultants/Specialty  NONE    Code Status  Full Code    Disposition  Patient is not medically cleared for discharge.   Anticipate discharge to to skilled nursing facility.  I have placed the appropriate orders for post-discharge needs.    Review of Systems  Review of Systems   Constitutional:  Positive for malaise/fatigue. Negative for chills and fever.   HENT: Negative.     Eyes: Negative.    Respiratory: Negative.     Cardiovascular: Negative.    Gastrointestinal: Negative.    Genitourinary: Negative.    Musculoskeletal:  Positive for back pain, falls, joint pain and myalgias.   Skin: Negative.    Neurological:  Positive for weakness.   Endo/Heme/Allergies: Negative.    Psychiatric/Behavioral: Negative.        Physical Exam  Temp:  [35.9 °C (96.6 °F)-36.9 °C (98.4 °F)] 36.4 °C (97.6 °F)  Pulse:  [48-78] 48  Resp:  [12-20] 18  BP: ()/(41-71) 112/53  SpO2:  [81 %-100 %] 95 %    Physical Exam  Vitals and nursing note reviewed.   Constitutional:       Appearance: He is obese.   HENT:      Head: Normocephalic.      Nose: Nose normal.      Mouth/Throat:      Mouth: Mucous membranes are moist.      Pharynx: Oropharynx is clear.   Eyes:      Pupils: Pupils are equal, round, and reactive to light.   Cardiovascular:      Rate and  Rhythm: Normal rate and regular rhythm.      Pulses: Normal pulses.      Heart sounds: Normal heart sounds.   Pulmonary:      Effort: Pulmonary effort is normal.      Breath sounds: Normal breath sounds.   Abdominal:      General: Bowel sounds are normal.      Palpations: Abdomen is soft.   Musculoskeletal:         General: Tenderness present.      Cervical back: Normal range of motion and neck supple.   Skin:     General: Skin is dry.      Capillary Refill: Capillary refill takes 2 to 3 seconds.   Neurological:      Mental Status: He is alert. Mental status is at baseline.      Motor: Weakness present.      Gait: Gait abnormal.       Fluids    Intake/Output Summary (Last 24 hours) at 4/25/2023 1214  Last data filed at 4/25/2023 1100  Gross per 24 hour   Intake 1846.4 ml   Output 450 ml   Net 1396.4 ml       Laboratory  Recent Labs     04/24/23  1551 04/25/23  0536   WBC 6.5 5.8   RBC 3.10* 3.08*   HEMOGLOBIN 9.9* 9.9*   HEMATOCRIT 31.1* 31.4*   .3* 101.9*   MCH 31.9 32.1   MCHC 31.8* 31.5*   RDW 49.8 50.8*   PLATELETCT 129* 109*   MPV 9.4 9.6     Recent Labs     04/24/23  1551 04/25/23  0536   SODIUM 140 141   POTASSIUM 4.3 4.1   CHLORIDE 105 111   CO2 22 18*   GLUCOSE 115* 86   BUN 54* 53*   CREATININE 2.82* 2.22*   CALCIUM 8.3* 7.9*     Recent Labs     04/24/23  1551   APTT 24.8   INR 1.26*               Imaging  DX-PELVIS-1 OR 2 VIEWS   Final Result      Successful reduction of dislocated left hip arthroplasty.      DX-PELVIS-1 OR 2 VIEWS   Final Result      1.  There is a superior left hip arthroplasty dislocation.      DX-CHEST-PORTABLE (1 VIEW)   Final Result      1.  Left basilar opacity which may represent consolidation or contusion with probable small effusion versus pleural thickening.   2.  Moderate enlargement of the cardiomediastinal silhouette.      DX-FEMUR-2+ LEFT   Final Result      1.  No acute displaced fracture of the left femur.   2.  There is a left superior hip arthroplasty  dislocation.           Assessment/Plan  * TAMMI (acute kidney injury) (HCC)- (present on admission)  Assessment & Plan  Creatinine 2.82 BUN 54 on admission  -Unknown baseline was normal approximately 5 years ago which was last results and laboratory.  Patient reports Dr. Taylor discontinued his Benzapril today 2/2 to concerns about Kidney Function but was waiting on labs.   -Received 1 L bolus NS in ED  -Continue IVF at 125 mL/h  -Repeat CBC CMP in a.m.    Hip dislocation, left (HCC)  Assessment & Plan  Noted on XRAY     1.  No acute displaced fracture of the left femur.  2.  There is a left superior hip arthroplasty dislocation.    -Continue PT/OT    Ground-level fall  Assessment & Plan  Patient brought to ED for trauma secondary to ground-level fall with left hip arthroplasty dislocation.  Patient reports mechanical fall with left knee which is also prosthetic giving out on him.  He had this happen once before approximately 4 to 5 years ago with reduction of the same hip.  -Patient underwent successful left hip reduction in ED.  Improved pain and movement following reduction.  -Fall precautions  -PT eval  -Recommend outpatient orthopedic follow-up    Orthostatic dizziness  Assessment & Plan  Patient reports a recent history of orthostatic dizziness upon standing.  Was told by his PCP to discontinue his Benzapril, as well as instructed to stand and wait 20 to 30 seconds prior to walking.  -This fall not related to orthostatic, patient reports left knee giving out while walking  -Patient's BP improved after 1 L bolus in ED  -Patient currently on tamsulosin 0.8 for BPH  -PT eval, Recommend F/U with PCP, Consider stopping Tamsulosin.     Benign essential hypertension- (present on admission)  Assessment & Plan  Patient with episode of orthostatic hypotension in the emergency department.    -Benazepril held secondary to TAMMI  -Continue metoprolol XL 12.5 mg  -Continue atorvastatin 80 mg daily         VTE prophylaxis:  heparin ppx    I have performed a physical exam and reviewed and updated ROS and Plan today (4/25/2023). In review of yesterday's note (4/24/2023), there are no changes except as documented above.    I, JIM Fortune performed a substantiated portion of the service face-to-face with same patient on the same date of service INDEPENDENTLY on assessment, examination and discussing plan of care with patient family FOR 20 MINUTES.  I was personally involved in reviewing and conducting the medical decision making, including the information as described above.    ====================================================================================================================================================================================================================================================================  Please note that this dictation was created using voice recognition software. I have made every reasonable attempt to correct obvious errors, but there may be errors of grammar and possibly content that I did not discover before finalizing the note.    Electronically signed by:  Dr. ANTHONY Fuchs, DNP, APRN, FNP-C  Hospitalist Services  [facility]  [phone]  [email or web address]  04/25/23                 [id number]

## 2023-04-25 NOTE — THERAPY
"Physical Therapy   Initial Evaluation     Patient Name: Taran Ornelas  Age:  82 y.o., Sex:  male  Medical Record #: [id number]  Today's Date: 4/25/2023     Precautions  Precautions: Fall Risk;Immobilizer Left Lower Extremity    Assessment  Patient is 82 y.o. male admitted following GLF with L prosthetic hip dislocation, successfully reduced in ED and found to have TAMMI. PMH includes  CAD, MI, HTN, gout, left hip and knee replacement. Pt presents to physical therapy with impairments in functional transfers, strength, balance, gait, and activity tolerance as detailed below. Pt ambulated 60 ft with FWW, progressive fatigue and SOB noted throughout. SpO2 monitored while ambulating on RA (pt's baseline) and observed SpO2 to range from 83-88/90% during ambulation. Required 1L O2 upon completion of ambulation and approx 3 minutes seated rest for SpO2 to stabilize > 90%, pt left on 1L at end of session as SpO2 at 92%- RN notified. Discussed likely need for placement with pt and reasoning, which he agreed. Family arrived at end of session and PT and pt reiterated current function, concerns with DC home and recommendation of post acute placement vs Home with HH at this time. Family asked about obtaining ramp access, provided brief education regarding pre-fabricated ramps which can be found online or using local resources such as \"[employer]\" for affordable, permanent ramp entry. Pt will benefit from PT interventions to address impairments and optimize functional mobility and safety to eventually return home with family support.     Pt would benefit from an Occupational Therapy evaluation to identify needs as well.     Plan    Physical Therapy Initial Treatment Plan   Treatment Plan : Bed Mobility, Equipment, Gait Training, Neuro Re-Education / Balance, Self Care / Home Evaluation, Stair Training, Therapeutic Activities, Therapeutic Exercise, Orthotics Training   Treatment Frequency: 4 Times per Week  Duration: Until Therapy Goals " Met       Discharge Recommendations: Recommend post-acute placement for additional physical therapy services prior to discharge home. Provided with choice form for local facilities so family can tour and prepare for DC planning.           04/25/23 1138   Precautions   Precautions Fall Risk;Immobilizer Left Lower Extremity   Vitals   Pulse 67   Blood Pressure  (!) 142/65   Pulse Oximetry 90 %   O2 Delivery Device Nasal Cannula   Vitals Comments trialed mobility on RA with SpO2 ranging from 83-88%, mostly at 85% with short distance ambulation   Prior Living Situation   Prior Services Intermittent Physical Support for ADL Per Family   Housing / Facility Mobile Home   Steps Into Home 4  (-5 stairs with B railings)   Bathroom Set up Bathtub / Shower Combination   Equipment Owned Front-Wheel Walker;Single Point Cane   Lives with - Patient's Self Care Capacity Adult Children   Comments pt lives with his Dtr and SANDRA who are retired and assist with IADLs   Prior Level of Functional Mobility   Bed Mobility Independent   Transfer Status Independent   Ambulation Independent   Ambulation Distance limited community   Assistive Devices Used Front-Wheel Walker   Stairs Independent   Comments pt reports the MD discussed using a 4WW in the future   Cognition    Level of Consciousness Alert   Comments pleasant and receptive to PT, can be tangential with story telling motivated to maintain independence   Active ROM Lower Body    Active ROM Lower Body  WDL   Comments removed L LE immobilizer to assess hip (to 90*) and knee flexion   Strength Lower Body   Lower Body Strength  WDL   Comments MMT R LE grossly 4+/5, L LE 4/5   Balance Assessment   Sitting Balance (Static) Good   Sitting Balance (Dynamic) Good   Standing Balance (Static) Fair   Standing Balance (Dynamic) Fair -   Weight Shift Sitting Fair   Weight Shift Standing Fair   Comments w/ FWW   Bed Mobility    Supine to Sit Minimal Assist   Sit to Supine Standby Assist   Scooting  Standby Assist   Comments extra time to transition supine to sit EOB due to immobilizer in place   Gait Analysis   Gait Level Of Assist Contact Guard Assist   Assistive Device Front Wheel Walker   Distance (Feet) 60   # of Times Distance was Traveled 1   Deviation Bradykinetic  (dec step length)   # of Stairs Climbed 0   Weight Bearing Status no restrictions   Functional Mobility   Sit to Stand Minimal Assist   Bed, Chair, Wheelchair Transfer Contact Guard Assist   Transfer Method Stand Step   How much difficulty does the patient currently have...   Turning over in bed (including adjusting bedclothes, sheets and blankets)? 1   Sitting down on and standing up from a chair with arms (e.g., wheelchair, bedside commode, etc.) 1   Moving from lying on back to sitting on the side of the bed? 1   How much help from another person does the patient currently need...   Moving to and from a bed to a chair (including a wheelchair)? 3   Need to walk in a hospital room? 3   Climbing 3-5 steps with a railing? 2   6 clicks Mobility Score 11   Activity Tolerance   Sitting Edge of Bed 20+ min   Standing 13 min   Patient / Family Goals    Patient / Family Goal #1 to return home and able to care for himself   Short Term Goals    Short Term Goal # 1 pt will perform supine <> sit with HOB flat, no railing and SPV in 6 visits   Short Term Goal # 2 pt will perform sit <> stand and functional transfers with LRAD and SPV to improve mobility independence in 6 visits   Short Term Goal # 3 pt will ambulate > 100 ft with LRAD and SPV to access home environment in 6 visits   Short Term Goal # 4 pt will negotiate 4-5 steps with railing support and SPV to enter/exit home in 6 visits   Short Term Goal # 5 pt will check and adjust knee immobilizer independently prior to mobility to ensure safety and reduce potential for repeat dislocation in 6 visits   Education Group   Education Provided Role of Physical Therapist;Use of Assistive Device;Brace Wear  and Care   Role of Physical Therapist Patient Response Family;Patient;Acceptance;Explanation;Verbal Demonstration   Use of Assistive Device Patient Response Patient;Family;Acceptance;Explanation;Verbal Demonstration   Brace Wear & Care Patient Response Patient;Family;Acceptance;Explanation;Verbal Demonstration   Physical Therapy Initial Treatment Plan    Treatment Plan  Bed Mobility;Equipment;Gait Training;Neuro Re-Education / Balance;Self Care / Home Evaluation;Stair Training;Therapeutic Activities;Therapeutic Exercise;Orthotics Training    Treatment Frequency 4 Times per Week   Duration Until Therapy Goals Met   Problem List    Problems Pain;Impaired Bed Mobility;Impaired Transfers;Impaired Ambulation;Functional Strength Deficit;Impaired Balance;Decreased Activity Tolerance   Anticipated Discharge Equipment and Recommendations   Discharge Recommendations Recommend post-acute placement for additional physical therapy services prior to discharge home   Interdisciplinary Plan of Care Collaboration   IDT Collaboration with  ;Family / Caregiver;Nursing   Patient Position at End of Therapy In Bed;Call Light within Reach;Tray Table within Reach;Family / Friend in Room   Collaboration Comments aware of PT eval and recs   Session Information   Date / Session Number  4/25-1 (1/4, 5/1)

## 2023-04-25 NOTE — RESPIRATORY CARE
Conscious Sedation Respiratory Update       O2 (LPM): 10 (04/24/23 1723)       Events/Summary/Plan: present for conscious sedation. ETCO2 9-17 t/o procedure. no complications (04/24/23 1723)

## 2023-04-25 NOTE — ED NOTES
Upon returning from walk, pt noted to be hypotensive and hypoxic. ERP notified. Pt placed on 3L O2 and 1L NS hung.

## 2023-04-25 NOTE — ASSESSMENT & PLAN NOTE
Patient reports a recent history of orthostatic dizziness upon standing.  Was told by his PCP to discontinue his Benzapril, as well as instructed to stand and wait 20 to 30 seconds prior to walking.  -This fall not related to orthostatic, patient reports left knee giving out while walking  -Patient's BP improved after 1 L bolus in ED  -Patient currently on tamsulosin 0.8 for BPH  -PT eval, Recommend F/U with PCP, Consider stopping Tamsulosin.     Improved  continue monitoring  Stopped fluids.   Monitoring.  ekg my reading SR, no ischemic changes no st segment changes.     Now on comfort care

## 2023-04-25 NOTE — ASSESSMENT & PLAN NOTE
After aggressive diuresis patient's blood pressures in the 120s over 70s much improved.  Continue with Coreg 6.25 mg twice daily.  Continue to hold on benazepril at present.  I have discontinued IV diuretic and placed patient on steady dose of torsemide

## 2023-04-25 NOTE — CARE PLAN
The patient is Stable - Low risk of patient condition declining or worsening         Progress made toward(s) clinical / shift goals:          Problem: Knowledge Deficit - Standard  Goal: Patient and family/care givers will demonstrate understanding of plan of care, disease process/condition, diagnostic tests and medications  Outcome: Progressing     Problem: Fall Risk  Goal: Patient will remain free from falls  Outcome: Progressing

## 2023-04-25 NOTE — ED NOTES
Med rec updated and complete. Allergies reviewed and updated. Per interview with pt and phone call to family ( daughter).  Pt had an appointment with his PCP today and was told to stop the benazepril . Removed medication from med rec. PCP also informed pt and daughter not to take ibuprofen. Medication added to allergy list.    Home Pharmacy  [facility] [phone]

## 2023-04-25 NOTE — ASSESSMENT & PLAN NOTE
Patient brought to ED for trauma secondary to ground-level fall with left hip arthroplasty dislocation.  Patient reports mechanical fall with left knee which is also prosthetic giving out on him.  He had this happen once before approximately 4 to 5 years ago with reduction of the same hip.  -Patient underwent successful left hip reduction in ED.  Improved pain and movement following reduction.  -Fall precautions  -PT eval  -Recommend outpatient orthopedic follow-up

## 2023-04-25 NOTE — ASSESSMENT & PLAN NOTE
Patient reports a recent history of orthostatic dizziness upon standing.  Was told by his PCP to discontinue his Benzapril, as well as instructed to stand and wait 20 to 30 seconds prior to walking.  -This fall not related to orthostatic, patient reports left knee giving out while walking  -Patient's BP improved after 1 L bolus in ED  -Patient currently on tamsulosin 0.8 for BPH  -PT eval, Recommend F/U with PCP, Consider stopping Tamsulosin.

## 2023-04-25 NOTE — PROGRESS NOTES
Report received from SHRADDHA Santiago. Patient is A&Ox4, up with on and a walker- unsteady gait, uses call light when needed. Admission questions answered and skin note completed. Patient questions answered, call light within reach, medicated per MAR, and all needs met at this time.

## 2023-04-25 NOTE — ASSESSMENT & PLAN NOTE
Patient brought to ED for trauma secondary to ground-level fall with left hip arthroplasty dislocation.  Patient reports mechanical fall with left knee which is also prosthetic giving out on him.  He had this happen once before approximately 4 to 5 years ago with reduction of the same hip.  -Patient underwent successful left hip reduction in ED.  Improved pain and movement following reduction.  -Fall precautions  -PT eval  -Recommend outpatient orthopedic follow-up    Monitoring.   Pt/ot recommending snf.  -Patient pending [facility] facility [facility].  Patient was had a hypotension seemingly resolving.

## 2023-04-25 NOTE — ASSESSMENT & PLAN NOTE
Creatinine 2.82 BUN 54 on admission  -Unknown baseline was normal approximately 5 years ago which was last results and laboratory.  Patient reports Dr. Taylor discontinued his Benzapril today 2/2 to concerns about Kidney Function but was waiting on labs.   -Received 1 L bolus NS in ED  -Continue IVF at 125 mL/h  -Repeat CBC CMP in a.m.

## 2023-04-26 NOTE — DISCHARGE PLANNING
Case Management Discharge Planning    Admission Date: 4/24/2023  GMLOS: 3.7  ALOS: 1    6-Clicks ADL Score:    6-Clicks Mobility Score: 11  PT and/or OT Eval ordered: Yes  Post-acute Referrals Ordered: Yes  Post-acute Choice Obtained: No  Has referral(s) been sent to post-acute provider:  No      Anticipated Discharge Dispo: Discharge Disposition: D/T to SNF with Medicare cert in anticipation of skilled care (03)  Discharge Contact Phone Number: [phone]    DME Needed: No    Action(s) Taken: OTHER Per rounds, patient medically ready to dc to SNF. No choice has been obtained, but there is a note that daughter/family visiting facilities.    Escalations Completed: None  Per MD patient can dc to SNF. Family not in room. Telephone call to patient's daughter Ashley's cell phone of [phone]. Mail box is not set up. Telephone call to patient's daughter home phone of [phone] and per male who answered phone, Ashley is at the hospital.     No choice obtained.As patient has [employer] and requires authorization, blanket referral sent.    Received call from Ashley who states this first and only choice is:    [facility]  [address] [place] [address]  Phone: [phone]  Director: Marko Santamaria states that they take the patient's healthcare. The best contact number to reach Ashley is: [phone] (home phone).       Medically Clear: Yes    Next Steps: RN CM will continue to follow.    Barriers to Discharge: Medical clearance and Pending Placement    Is the patient up for discharge tomorrow: No    Natasha EPPS, RN Case Manager  [phone]

## 2023-04-26 NOTE — CARE PLAN
The patient is Stable - Low risk of patient condition declining or worsening    Shift Goals  Clinical Goals: TAMMI treatment  Patient Goals: Comfort    Progress made toward(s) clinical / shift goals:    Problem: Knowledge Deficit - Standard  Goal: Patient and family/care givers will demonstrate understanding of plan of care, disease process/condition, diagnostic tests and medications  Outcome: Progressing     Problem: Fall Risk  Goal: Patient will remain free from falls  Outcome: Progressing       Patient is not progressing towards the following goals:

## 2023-04-26 NOTE — PROGRESS NOTES
4 Eyes Skin Assessment Completed by SHRADDHA De La O and SHRADDHA Rutherford.    Head WDL  Ears WDL  Nose WDL  Mouth WDL  Neck WDL  Breast/Chest WDL  Shoulder Blades WDL  Spine WDL  (R) Arm/Elbow/Hand WDL  (L) Arm/Elbow/Hand WDL  Abdomen Scar  Groin WDL  Scrotum/Coccyx/Buttocks WDL  (R) Leg WDL  (L) Leg WDL  (R) Heel/Foot/Toe WDL  (L) Heel/Foot/Toe WDL          Devices In Places Pulse Ox and Nasal Cannula      Interventions In Place Pillows    Possible Skin Injury No    Pictures Uploaded Into Epic N/A  Wound Consult Placed N/A  RN Wound Prevention Protocol Ordered No

## 2023-04-26 NOTE — PROGRESS NOTES
Received patient from CDU, patient alert and oriented x 4, denies pain, brace on place to patients left leg, IV fluids infusing.

## 2023-04-26 NOTE — THERAPY
Physical Therapy   Daily Treatment     Patient Name: Taran Ornelas  Age:  82 y.o., Sex:  male  Medical Record #: [id number]  Today's Date: 4/26/2023    Precautions: Fall Risk, LLE WBAT with KI, posterior hip precautions (confirmed with ortho PA, suggests wears KI at least 2 weeks)    Assessment  Pt seen for PT tx session, needs continued edu on posterior hip precautions and proper positioning of LLE knee immobilizer. Pt able to ambulate with CGA, FWW, no overt LOB and only mildly antalgic gait. Session today limited by c/o nausea. PT will follow. Recommend OT consult.     Plan  Continue Current Treatment Plan  Type of Treatment: Bed Mobility, Equipment, Gait Training, Neuro Re-Education / Balance, Self Care / Home Evaluation, Stair Training, Therapeutic Activities, Therapeutic Exercise, Orthotics Training   Treatment Frequency: 4 Times per Week  Treatment Duration: Until Therapy Goals Met    DC Equipment Recommendations: Unable to determine at this time  Discharge Recommendations: Recommend post-acute placement for additional physical therapy services prior to discharge home     04/26/23 1509   Cognition    Level of Consciousness Alert   Comments motivated but distracted by nausea   Balance   Sitting Balance (Static) Good   Sitting Balance (Dynamic) Good   Standing Balance (Static) Fair   Standing Balance (Dynamic) Fair -   Weight Shift Sitting Fair   Weight Shift Standing Fair   Comments standing with FWW   Bed Mobility    Supine to Sit Standby Assist   Sit to Supine Minimal Assist   Scooting Supervised   Gait Analysis   Gait Level Of Assist Contact Guard Assist   Assistive Device Front Wheel Walker   Distance (Feet) 70   # of Times Distance Traveled 2   Weight Bearing Status no restrictions, has KI   Skilled Intervention Verbal Cuing;Tactile Cuing   Comments limited by nausea, denies pain   Functional Mobility   Sit to Stand Minimal Assist   Bed, Chair, Wheelchair Transfer Contact Guard Assist   Comments cues for hand  placement   Short Term Goals    Short Term Goal # 1 pt will perform supine <> sit with HOB flat, no railing and SPV in 6 visits   Goal Outcome # 1 goal not met   Short Term Goal # 2 pt will perform sit <> stand and functional transfers with LRAD and SPV to improve mobility independence in 6 visits   Goal Outcome # 2 Goal not met   Short Term Goal # 3 pt will ambulate > 100 ft with LRAD and SPV to access home environment in 6 visits   Goal Outcome # 3 Goal not met   Short Term Goal # 4 pt will negotiate 4-5 steps with railing support and SPV to enter/exit home in 6 visits   Goal Outcome # 4 Goal not met   Short Term Goal # 5 pt will check and adjust knee immobilizer independently prior to mobility to ensure safety and reduce potential for repeat dislocation in 6 visits   Goal Outcome # 5 Goal not met

## 2023-04-26 NOTE — DISCHARGE PLANNING
Received Choice form at 1426  Agency/Facility Name: [facility]  Referral sent per Choice form at 1436

## 2023-04-26 NOTE — PROGRESS NOTES
Assumed care of patient 0700. Received Report from [facility] nurse. Patient A&O3 hard of hearing, clear speech, on 1.5 lpm of 02 via nasal cannula, Reporting a pain level of 0. Call light within reach, belongings within reach, Fall precautions in place, and bed alarm is on and bed in lowest position. Patient does not have any other needs at this time.

## 2023-04-26 NOTE — PROGRESS NOTES
Hospital Medicine Daily Progress Note    Date of Service  4/26/2023    Chief Complaint  Taran Ornelas is a 82 y.o. male admitted 4/24/2023 with ground level fall    Hospital Course  Mr. Taran Ornelas is a 82 y.o. male with a past medical history significant for CAD, MI, HTN, gout, left hip and knee replacement. who presented on 4/204/2023 after ground-level fall at home onto his left hip with inability to move his left hip.      Patient reports that he was walking in his house when his left knee gave out having him fall on his left hip.  Patient denies any syncopal episodes, or dizziness prior to falling.  Patient does report that he was having some orthostatic episodes but he would wait for them to pass prior to walking which has been going on for few weeks.  Patient reports that he has a history of multiple falls due to his left knee not holding his weight.  He reports that when he fell this time he landed on his left hip with inability to move the left leg or get up.  Denies any head trauma, or trauma to any other parts of the body.  Did not lose consciousness.    In ER, patient noted to have normal vital signs.  Notable lab findings include RBC at 3.10, hemoglobin 9.9, hematocrit 31.1, glucose 115, BUN 54, creatinine 2.82, GFR 21, calcium 8.3, INR 1.26, PT 15.6.  X-ray left femur notes no acute displaced fracture of the left femur but there is a left superior hip arthroplasty dislocation noted.  Chest x-ray notes left basilar opacity likely representative of consolidation or contusion with probable small effusion versus pleural thickening with moderate enlargement of the cardiomediastinal silhouette.  Patient admitted with hospital medicine for management of TAMMI, postacute evaluation with PT, and disposition planning.    Interval Problem Update  -Patient seen and examined.  Noted severe bruising on left leg.  Patient reports left hip pain.  Discussed with patient plan of care including continuation of hydration  due to TAMMI.  Continuation PT evaluation, more likely patient will go to a skilled facility for continued therapy  -Plan of care: Continue pain management for left hip pain; continuation IV fluids due to TAMMI; will likely need placement postacute for continued therapy  -Disposition: Patient switched to inpatient status as he is anticipated to stay 2-3 midnights for management of TAMMI, evaluation for placement for continued physical therapy due to left hip dislocation  -Lab work: Reviewed; expected  -VSS at this time        4/26 In bed, family at bedside, no fever or chills on 2L of o2, normally he does not wear any o2, denies chest pain or sob no palpitation, not in distress, cxr reviewed cephalization, I have ordered ekg, will also order echo, bnp, I have discussed with patient patient's family , all questions answered. Patient having falls as outpatient bp meds recently adjusted.         I have discussed this patient's plan of care and discharge plan at IDT rounds today with Case Management, Nursing, Nursing leadership, and other members of the IDT team.    Consultants/Specialty  NONE    Code Status  Full Code    Disposition  Patient is not medically cleared for discharge.   Anticipate discharge to to skilled nursing facility.  I have placed the appropriate orders for post-discharge needs.    Review of Systems  Review of Systems   Constitutional:  Positive for malaise/fatigue. Negative for chills and fever.   HENT: Negative.     Eyes: Negative.    Respiratory: Negative.     Cardiovascular: Negative.    Gastrointestinal: Negative.    Genitourinary: Negative.    Musculoskeletal:  Positive for falls and joint pain. Negative for back pain and myalgias.   Skin: Negative.    Neurological:  Positive for weakness.   Endo/Heme/Allergies: Negative.    Psychiatric/Behavioral: Negative.        Physical Exam  Temp:  [36.1 °C (97 °F)-37.1 °C (98.7 °F)] 36.1 °C (97 °F)  Pulse:  [59-70] 64  Resp:  [16-18] 17  BP: (123-148)/(55-97)  148/65  SpO2:  [93 %-97 %] 97 %    Physical Exam  Vitals and nursing note reviewed.   Constitutional:       Appearance: He is obese.   HENT:      Head: Normocephalic.      Nose: Nose normal.   Eyes:      General: No scleral icterus.        Right eye: No discharge.         Left eye: No discharge.   Cardiovascular:      Rate and Rhythm: Normal rate and regular rhythm.      Pulses: Normal pulses.      Heart sounds: Normal heart sounds.   Pulmonary:      Effort: Pulmonary effort is normal.      Breath sounds: Normal breath sounds.   Abdominal:      General: Bowel sounds are normal. There is no distension.      Palpations: Abdomen is soft.      Tenderness: There is no abdominal tenderness.   Musculoskeletal:         General: Tenderness present.      Cervical back: Normal range of motion and neck supple.   Skin:     General: Skin is dry.      Capillary Refill: Capillary refill takes 2 to 3 seconds.   Neurological:      Mental Status: He is alert. Mental status is at baseline.      Motor: Weakness present.      Gait: Gait abnormal.   Psychiatric:         Mood and Affect: Mood normal.         Behavior: Behavior normal.       Fluids    Intake/Output Summary (Last 24 hours) at 4/26/2023 1634  Last data filed at 4/26/2023 1200  Gross per 24 hour   Intake --   Output 600 ml   Net -600 ml         Laboratory  Recent Labs     04/24/23  1551 04/25/23  0536 04/26/23  0133   WBC 6.5 5.8 5.6   RBC 3.10* 3.08* 2.90*   HEMOGLOBIN 9.9* 9.9* 9.5*   HEMATOCRIT 31.1* 31.4* 29.0*   .3* 101.9* 100.0*   MCH 31.9 32.1 32.8   MCHC 31.8* 31.5* 32.8*   RDW 49.8 50.8* 49.2   PLATELETCT 129* 109* 108*   MPV 9.4 9.6 9.5       Recent Labs     04/24/23  1551 04/25/23  0536 04/26/23  0133   SODIUM 140 141 143   POTASSIUM 4.3 4.1 3.9   CHLORIDE 105 111 112   CO2 22 18* 20   GLUCOSE 115* 86 97   BUN 54* 53* 48*   CREATININE 2.82* 2.22* 1.90*   CALCIUM 8.3* 7.9* 8.0*       Recent Labs     04/24/23  1551   APTT 24.8   INR 1.26*                  Imaging  DX-PELVIS-1 OR 2 VIEWS   Final Result      Successful reduction of dislocated left hip arthroplasty.      DX-PELVIS-1 OR 2 VIEWS   Final Result      1.  There is a superior left hip arthroplasty dislocation.      DX-CHEST-PORTABLE (1 VIEW)   Final Result      1.  Left basilar opacity which may represent consolidation or contusion with probable small effusion versus pleural thickening.   2.  Moderate enlargement of the cardiomediastinal silhouette.      DX-FEMUR-2+ LEFT   Final Result      1.  No acute displaced fracture of the left femur.   2.  There is a left superior hip arthroplasty dislocation.             Assessment/Plan  * TAMMI (acute kidney injury) (HCC)- (present on admission)  Assessment & Plan  Creatinine 2.82 BUN 54 on admission  -Unknown baseline was normal approximately 5 years ago which was last results and laboratory.  Patient reports Dr. Taylor discontinued his Benzapril today 2/2 to concerns about Kidney Function but was waiting on labs.   Stopped ivf  Cr improved  Continue monitoring.     Hip dislocation, left (HCC)  Assessment & Plan  Noted on XRAY     1.  No acute displaced fracture of the left femur.  2.  There is a left superior hip arthroplasty dislocation.    -Continue PT/OT  snf recommended.     Ground-level fall  Assessment & Plan  Patient brought to ED for trauma secondary to ground-level fall with left hip arthroplasty dislocation.  Patient reports mechanical fall with left knee which is also prosthetic giving out on him.  He had this happen once before approximately 4 to 5 years ago with reduction of the same hip.  -Patient underwent successful left hip reduction in ED.  Improved pain and movement following reduction.  -Fall precautions  -PT eval  -Recommend outpatient orthopedic follow-up    Monitoring.   Pt/ot recommending snf.     Orthostatic dizziness  Assessment & Plan  Patient reports a recent history of orthostatic dizziness upon standing.  Was told by his PCP to  discontinue his Benzapril, as well as instructed to stand and wait 20 to 30 seconds prior to walking.  -This fall not related to orthostatic, patient reports left knee giving out while walking  -Patient's BP improved after 1 L bolus in ED  -Patient currently on tamsulosin 0.8 for BPH  -PT eval, Recommend F/U with PCP, Consider stopping Tamsulosin.     Improved  continue monitoring  Stopped fluids.     Edema- (present on admission)  Assessment & Plan  Lower ext  Patient had old echo as outpatient showed normal ef 55-60 on 2020  Will check bnp  cxr my reading increased cephalization, if bnp elevated will get echo.    Paroxysmal atrial fibrillation (HCC)- (present on admission)  Assessment & Plan  On metoprolol   Not on any a/c.   Due to h/o fall patient probably is high risk for a/c.       Benign essential hypertension- (present on admission)  Assessment & Plan  Patient with episode of orthostatic hypotension in the emergency department.    -Benazepril held secondary to TAMMI  -Continue metoprolol XL 12.5 mg  -Continue atorvastatin 80 mg daily    bp stable. Continue monitiring         VTE prophylaxis: heparin ppx    I have performed a physical exam and reviewed and updated ROS and Plan today (4/26/2023). In review of yesterday's note (4/25/2023), there are no changes except as documented above.      My total time spent caring for the patient on the day of the encounter was 57 minutes.   This does not include time spent on separately billable procedures/tests.

## 2023-04-26 NOTE — ASSESSMENT & PLAN NOTE
Lower ext  Patient had old echo as outpatient showed normal ef 55-60 on 2020  Will check bnp  cxr my reading increased cephalization, if bnp elevated will get echo.  Echo pending.     Comfort care

## 2023-04-26 NOTE — ASSESSMENT & PLAN NOTE
On metoprolol   Not on any a/c.   Due to h/o fall patient probably is high risk for a/c.   EKG SR.  Medically managed on metoprolol    Comfort care

## 2023-04-27 NOTE — CARE PLAN
The patient is Stable - Low risk of patient condition declining or worsening    Shift Goals  Clinical Goals: PT OT  Patient Goals: Comfort  Family Goals: update    Progress made toward(s) clinical / shift goals:    Problem: Knowledge Deficit - Standard  Goal: Patient and family/care givers will demonstrate understanding of plan of care, disease process/condition, diagnostic tests and medications  Outcome: Progressing     Problem: Fall Risk  Goal: Patient will remain free from falls  Outcome: Progressing     Problem: Pain - Standard  Goal: Alleviation of pain or a reduction in pain to the patient’s comfort goal  Outcome: Progressing       Patient is not progressing towards the following goals:

## 2023-04-27 NOTE — PROGRESS NOTES
NOC HOSPITALIST CROSS COVER    Notified by RN regarding patient complaining of 7/10 abdominal pain.  Patient was seen and examined at bedside.  He has severe abdominal pain that is worse to palpation to the mid epigastric region.  Stat CT abdomen obtained and demonstrated cholelithiasis, bladder stones, and small layering bilateral pleural effusions.  The patient was complaining of nausea, for which Compazine was added as Zofran was not effective.  Lactic acid was negative at 0.6.  Lipase was mildly elevated at 111.      Vitals:    04/27/23 0600   BP: 139/76   Pulse:    Resp:    Temp:    SpO2:             -----------------------------------------------------------------------------------------------------------    Electronically signed by:  Jasmina Cantu, MIKE, APRN, LUCP-BC  Hospitalist Services

## 2023-04-27 NOTE — PROGRESS NOTES
Hospital Medicine Daily Progress Note    Date of Service  4/27/2023    Chief Complaint  Taran Ornelas is a 82 y.o. male admitted 4/24/2023 with ground level fall    Hospital Course  Mr. Taran Ornelas is a 82 y.o. male with a past medical history significant for CAD, MI, HTN, gout, left hip and knee replacement. who presented on 4/204/2023 after ground-level fall at home onto his left hip with inability to move his left hip.      Patient reports that he was walking in his house when his left knee gave out having him fall on his left hip.  Patient denies any syncopal episodes, or dizziness prior to falling.  Patient does report that he was having some orthostatic episodes but he would wait for them to pass prior to walking which has been going on for few weeks.  Patient reports that he has a history of multiple falls due to his left knee not holding his weight.  He reports that when he fell this time he landed on his left hip with inability to move the left leg or get up.  Denies any head trauma, or trauma to any other parts of the body.  Did not lose consciousness.    In ER, patient noted to have normal vital signs.  Notable lab findings include RBC at 3.10, hemoglobin 9.9, hematocrit 31.1, glucose 115, BUN 54, creatinine 2.82, GFR 21, calcium 8.3, INR 1.26, PT 15.6.  X-ray left femur notes no acute displaced fracture of the left femur but there is a left superior hip arthroplasty dislocation noted.  Chest x-ray notes left basilar opacity likely representative of consolidation or contusion with probable small effusion versus pleural thickening with moderate enlargement of the cardiomediastinal silhouette.  Patient admitted with hospital medicine for management of TAMMI, postacute evaluation with PT, and disposition planning.    Interval Problem Update  -Patient seen and examined.  Noted severe bruising on left leg.  Patient reports left hip pain.  Discussed with patient plan of care including continuation of hydration  due to TAMMI.  Continuation PT evaluation, more likely patient will go to a skilled facility for continued therapy  -Plan of care: Continue pain management for left hip pain; continuation IV fluids due to TAMMI; will likely need placement postacute for continued therapy  -Disposition: Patient switched to inpatient status as he is anticipated to stay 2-3 midnights for management of TAMMI, evaluation for placement for continued physical therapy due to left hip dislocation  -Lab work: Reviewed; expected  -VSS at this time        4/26 In bed, family at bedside, no fever or chills on 2L of o2, normally he does not wear any o2, denies chest pain or sob no palpitation, not in distress, cxr reviewed cephalization, I have ordered ekg, will also order echo, bnp, I have discussed with patient patient's family , all questions answered. Patient having falls as outpatient bp meds recently adjusted.   4/27 In bed, he was sleeping I woke him up, when I pressed on his abdomen he c/o pain, ct abdomen done last night reviewed no acute finding, cholelithiasis but no signs of inflammation, lipase is 111 will add antiacid meds, lft's ordered and reviewed they are wnl. Discussed with patient's family, all questions answered.  Addendum: patient having more sob and restless, I have ordered cxr and one time dose of iv lasix. Previous cxr showed cephalization and congestion.   Cxr today reviewed my reading increasing pulmonary congestion/edema, echo showed EF 55%, RVSP 70-75 likely pulmonary htn. Will transfer to telemetry. I have seen and examined pt, rales on lung exam, no wheezing, lower ext edema 1+. Decreased b/l air entry, will add IS. Lasix received will f/u urine output will get bladder scan to make sure he is voiding.     I have discussed this patient's plan of care and discharge plan at IDT rounds today with Case Management, Nursing, Nursing leadership, and other members of the IDT team.    Consultants/Specialty  NONE    Code Status  Full  Code    Disposition  Patient is not medically cleared for discharge.   Anticipate discharge to to skilled nursing facility.  I have placed the appropriate orders for post-discharge needs.    Review of Systems  Review of Systems   Constitutional:  Positive for malaise/fatigue. Negative for chills and fever.   HENT: Negative.     Eyes: Negative.    Respiratory: Negative.     Cardiovascular: Negative.    Gastrointestinal:  Positive for abdominal pain.   Genitourinary: Negative.    Musculoskeletal:  Positive for falls and joint pain. Negative for back pain and myalgias.   Skin: Negative.    Neurological:  Positive for weakness.   Endo/Heme/Allergies: Negative.    Psychiatric/Behavioral: Negative.        Physical Exam  Temp:  [35.9 °C (96.7 °F)-36.7 °C (98 °F)] 36.2 °C (97.2 °F)  Pulse:  [60-65] 60  Resp:  [17-18] 18  BP: (139-159)/(52-76) 142/72  SpO2:  [94 %-98 %] 96 %    Physical Exam  Vitals and nursing note reviewed.   Constitutional:       Appearance: He is obese. He is ill-appearing.   HENT:      Head: Normocephalic.      Nose: Nose normal.   Eyes:      General: No scleral icterus.        Right eye: No discharge.         Left eye: No discharge.   Cardiovascular:      Rate and Rhythm: Normal rate and regular rhythm.      Pulses: Normal pulses.      Heart sounds: Normal heart sounds.   Pulmonary:      Effort: Pulmonary effort is normal.      Breath sounds: Normal breath sounds.   Abdominal:      General: Bowel sounds are normal. There is no distension.      Palpations: Abdomen is soft.      Tenderness: There is abdominal tenderness. There is no guarding or rebound.   Musculoskeletal:         General: Tenderness present.      Cervical back: Normal range of motion and neck supple.   Skin:     General: Skin is dry.      Capillary Refill: Capillary refill takes 2 to 3 seconds.   Neurological:      Mental Status: He is alert. Mental status is at baseline.      Motor: Weakness present.      Gait: Gait abnormal.    Psychiatric:         Mood and Affect: Mood normal.         Behavior: Behavior normal.       Fluids    Intake/Output Summary (Last 24 hours) at 4/27/2023 1314  Last data filed at 4/27/2023 0934  Gross per 24 hour   Intake --   Output 825 ml   Net -825 ml         Laboratory  Recent Labs     04/25/23  0536 04/26/23  0133 04/27/23  0054   WBC 5.8 5.6 5.6   RBC 3.08* 2.90* 2.89*   HEMOGLOBIN 9.9* 9.5* 9.4*   HEMATOCRIT 31.4* 29.0* 28.2*   .9* 100.0* 97.6   MCH 32.1 32.8 32.5   MCHC 31.5* 32.8* 33.3*   RDW 50.8* 49.2 47.8   PLATELETCT 109* 108* 113*   MPV 9.6 9.5 9.3       Recent Labs     04/25/23  0536 04/26/23  0133 04/27/23  0054   SODIUM 141 143 143   POTASSIUM 4.1 3.9 3.6   CHLORIDE 111 112 112   CO2 18* 20 22   GLUCOSE 86 97 119*   BUN 53* 48* 38*   CREATININE 2.22* 1.90* 1.39   CALCIUM 7.9* 8.0* 8.4*       Recent Labs     04/24/23  1551   APTT 24.8   INR 1.26*                 Imaging  CT-ABDOMEN-PELVIS W/O   Final Result         1.  Small layering bilateral pleural effusions, right greater than left.   2.  Hazy linear densities in bilateral lung bases suggesting atelectasis, component of infiltrate not excluded   3.  Cholelithiasis   4.  Bladder stones   5.  Atherosclerosis and atherosclerotic coronary artery disease      DX-PELVIS-1 OR 2 VIEWS   Final Result      Successful reduction of dislocated left hip arthroplasty.      DX-PELVIS-1 OR 2 VIEWS   Final Result      1.  There is a superior left hip arthroplasty dislocation.      DX-CHEST-PORTABLE (1 VIEW)   Final Result      1.  Left basilar opacity which may represent consolidation or contusion with probable small effusion versus pleural thickening.   2.  Moderate enlargement of the cardiomediastinal silhouette.      DX-FEMUR-2+ LEFT   Final Result      1.  No acute displaced fracture of the left femur.   2.  There is a left superior hip arthroplasty dislocation.      EC-ECHOCARDIOGRAM COMPLETE W/O CONT    (Results Pending)          Assessment/Plan  *  TAMMI (acute kidney injury) (HCC)- (present on admission)  Assessment & Plan  Creatinine 2.82 BUN 54 on admission  -Unknown baseline was normal approximately 5 years ago which was last results and laboratory.  Patient reports Dr. Taylor discontinued his Benzapril today 2/2 to concerns about Kidney Function but was waiting on labs.   Stopped ivf  Cr improved  Resolving     Hip dislocation, left (HCC)  Assessment & Plan  Noted on XRAY     1.  No acute displaced fracture of the left femur.  2.  There is a left superior hip arthroplasty dislocation.    -Continue PT/OT  snf recommended.     Ground-level fall  Assessment & Plan  Patient brought to ED for trauma secondary to ground-level fall with left hip arthroplasty dislocation.  Patient reports mechanical fall with left knee which is also prosthetic giving out on him.  He had this happen once before approximately 4 to 5 years ago with reduction of the same hip.  -Patient underwent successful left hip reduction in ED.  Improved pain and movement following reduction.  -Fall precautions  -PT eval  -Recommend outpatient orthopedic follow-up    Monitoring.   Pt/ot recommending snf.       Orthostatic dizziness  Assessment & Plan  Patient reports a recent history of orthostatic dizziness upon standing.  Was told by his PCP to discontinue his Benzapril, as well as instructed to stand and wait 20 to 30 seconds prior to walking.  -This fall not related to orthostatic, patient reports left knee giving out while walking  -Patient's BP improved after 1 L bolus in ED  -Patient currently on tamsulosin 0.8 for BPH  -PT eval, Recommend F/U with PCP, Consider stopping Tamsulosin.     Improved  continue monitoring  Stopped fluids.   Monitoring.  ekg my reading SR, no ischemic changes no st segment changes.     Edema- (present on admission)  Assessment & Plan  Lower ext  Patient had old echo as outpatient showed normal ef 55-60 on 2020  Will check bnp  cxr my reading increased  cephalization, if bnp elevated will get echo.  Echo pending.     Paroxysmal atrial fibrillation (HCC)- (present on admission)  Assessment & Plan  On metoprolol   Not on any a/c.   Due to h/o fall patient probably is high risk for a/c.   EKG SR.     Benign essential hypertension- (present on admission)  Assessment & Plan  Patient with episode of orthostatic hypotension in the emergency department.    -Benazepril held secondary to TAMMI  -Continue metoprolol XL 12.5 mg  -Continue atorvastatin 80 mg daily    bp stable. Continue monitiring         VTE prophylaxis: heparin ppx    I have performed a physical exam and reviewed and updated ROS and Plan today (4/27/2023). In review of yesterday's note (4/26/2023), there are no changes except as documented above.

## 2023-04-27 NOTE — CARE PLAN
The patient is Stable - Low risk of patient condition declining or worsening    Shift Goals  Clinical Goals: Iv hydration,PT OT  Patient Goals: comfort, safety  Family Goals: update    Progress made toward(s) clinical / shift goals:  patient walked  with physical therapist and tolerated well    Problem: Knowledge Deficit - Standard  Goal: Patient and family/care givers will demonstrate understanding of plan of care, disease process/condition, diagnostic tests and medications  Outcome: Progressing     Problem: Fall Risk  Goal: Patient will remain free from falls  Outcome: Progressing       Patient is not progressing towards the following goals:

## 2023-04-27 NOTE — CARE PLAN
The patient is Stable - Low risk of patient condition declining or worsening    Shift Goals  Clinical Goals: nausea relief, pain control  Patient Goals: nausea relief  Family Goals: update    Progress made toward(s) clinical / shift goals:    Problem: Knowledge Deficit - Standard  Goal: Patient and family/care givers will demonstrate understanding of plan of care, disease process/condition, diagnostic tests and medications  Outcome: Progressing     Problem: Fall Risk  Goal: Patient will remain free from falls  Outcome: Progressing     Problem: Pain - Standard  Goal: Alleviation of pain or a reduction in pain to the patient’s comfort goal  Outcome: Progressing       Patient is not progressing towards the following goals:pt still nauseous and dry heaves occasionally despite nausea meds,

## 2023-04-27 NOTE — DISCHARGE PLANNING
[facility] accepted  [facility] accepted  [facility] accepted    [facility] declined / non contracted insurance  [facility] declined / non contracted insurance  [facility] declined / non contracted insurance    1145- Spoke To: Salma   Agency/Facility Name: [facility]  Plan or Request: referral in review

## 2023-04-28 NOTE — PROGRESS NOTES
"Patient became restless and says\" I feel horrible I need to be comfortable somehow\" keeps turning on the the bed, getting back and laying back down repeatedly just to get to a comfortable position, SOB noted as well, Md alerted and came to assess patient         Bladder scan done after lasix, with results of >169 ml   Md at bedside.  "

## 2023-04-28 NOTE — PROGRESS NOTES
Patient awake, getting out of bed, not redirectable by telesitter. Patient guided to bed and placed on a lap belt. Patient demonstrated ability to release belt.

## 2023-04-28 NOTE — PROGRESS NOTES
Assumed care of patient 0700. Received Report from [facility] nurse. Patient A&O4 hard of hearing, on 2 lpm 02 chest is clear on auscultation with dyspnea on exertion but not in distress during assessment , Reporting a pain level of 2 on his back but declines intervention at this time. Call light within reach, belongings within reach, Fall precautions in place, and bed alarm is on and bed in lowest position. Patient complains of nausea and medicated per mar

## 2023-04-28 NOTE — CARE PLAN
Problem: Knowledge Deficit - Standard  Goal: Patient and family/care givers will demonstrate understanding of plan of care, disease process/condition, diagnostic tests and medications  Outcome: Progressing  Note: Patient impulsive overnight requiring telesitter.      Problem: Fall Risk  Goal: Patient will remain free from falls  Outcome: Progressing     Problem: Pain - Standard  Goal: Alleviation of pain or a reduction in pain to the patient’s comfort goal  Outcome: Progressing     Problem: Skin Integrity  Goal: Skin integrity is maintained or improved  Outcome: Progressing   The patient is Stable - Low risk of patient condition declining or worsening    Shift Goals  Clinical Goals: Rest, maintian safety  Patient Goals: get up and urinate  Family Goals: dea

## 2023-04-28 NOTE — THERAPY
Occupational Therapy   Initial Evaluation     Patient Name: Taran Ornelas  Age:  82 y.o., Sex:  male  Medical Record #: [id number]  Today's Date: 4/28/2023     Precautions  Precautions: Fall Risk, posterior hip, L LE WBAT    Assessment  Patient is 82 y.o. male with a diagnosis of L hip dislocation.  Pt currently limited by decreased functional mobility, activity tolerance, cognition, sensation, strength, AROM, coordination, balance, and pain which are affecting pt's ability to complete ADLs/IADLs at baseline. Pt would benefit from OT services in the acute care setting to maximize functional recovery.      Plan    Occupational Therapy Initial Treatment Plan   Treatment Interventions: (P) Self Care / Activities of Daily Living, Therapeutic Activity  Treatment Frequency: (P) 3 Times per Week  Duration: (P) Until Therapy Goals Met       Discharge Recommendations: (P) Recommend post-acute placement for additional occupational therapy services prior to discharge home        04/28/23 0855   Prior Living Situation   Prior Services Intermittent Physical Support for ADL Per Family   Housing / Facility Mobile Home   Steps Into Home 4   Bathroom Set up Bathtub / Shower Combination   Equipment Owned Front-Wheel Walker;Single Point Cane   Lives with - Patient's Self Care Capacity Adult Children   Prior Level of ADL Function   Self Feeding Independent   Grooming / Hygiene Independent   Bathing Independent   Dressing Independent   Toileting Independent   ADL Assessment   Grooming Supervision   Upper Body Dressing Minimal Assist   Lower Body Dressing Moderate Assist   Functional Mobility   Sit to Stand Minimal Assist   Bed, Chair, Wheelchair Transfer Minimal Assist   Short Term Goals   Short Term Goal # 1 supervised with UB dressing   Short Term Goal # 2 supervised with LB dressing   Short Term Goal # 3 supervised with ADL txfs   Occupational Therapy Initial Treatment Plan    Treatment Interventions Self Care / Activities of Daily  Living;Therapeutic Activity   Treatment Frequency 3 Times per Week   Duration Until Therapy Goals Met   Anticipated Discharge Equipment and Recommendations   Discharge Recommendations Recommend post-acute placement for additional occupational therapy services prior to discharge home

## 2023-04-28 NOTE — PROGRESS NOTES
Received report from dayshift RN. Found patient restless, yelling for help, wanting to get out bed constantly without calling staff. Per dayshift RN, patient has been restless since arrival to unit. Rn assisted patient with urinal and  redirected patient to bed.      2015-Patient with continued restlessness regarding getting up to urinate multiple times and reorientation to get back in bed. LINDA Schwab notified, orders received.

## 2023-04-28 NOTE — DISCHARGE PLANNING
"RN ALBAN spoke with \"Salma\" at [facility] as referral still pending. She states she will review and give CM a call back with decision. Patient has been accepted at three other facilities however, daughter \"Ashley\" would like to know if [facility] will take first. Patient's insurance will require an authorization so tentatively looking at Monday for discharge as this cannot be obtained on the weekend.   "

## 2023-04-28 NOTE — CARE PLAN
The patient is Stable - Low risk of patient condition declining or worsening    Shift Goals  Clinical Goals: mobilize, Placement planning, advance diet?  Patient Goals: get up and urinate  Family Goals: dea    Progress made toward(s) clinical / shift goals:    Problem: Fall Risk  Goal: Patient will remain free from falls  Outcome: Progressing   Pt has all fall precautions in place including  socks, bed alarm, strip alarm. Call light is within reach. Bedside table within reach. Pt re-educated on how to use call light for assistance whenever RN is exiting room.   Problem: Skin Integrity  Goal: Skin integrity is maintained or improved  Outcome: Progressing   Pt does shift self in bed quite frequently and stands at EOB roughly every hour. Skin integrity maintained. Barrier cream applied and in room for use. Condom cath trialed throughout day. Barrier moisture wipes ordered for use as well.     Patient is not progressing towards the following goals:      Problem: Knowledge Deficit - Standard  Goal: Patient and family/care givers will demonstrate understanding of plan of care, disease process/condition, diagnostic tests and medications  Outcome: Not Progressing   Pt updated on POC, tests, and medications. Pt verbalizes understanding however does not show evidence of learning. Family present throughout shift and educated/ given updated by RN and MD.

## 2023-04-28 NOTE — PROGRESS NOTES
Late entry    Patient continuously wanting ot get out of bed to use urinal, not calling RN nor waiting for assistance. RESlesst and uncomfortable in bed, unable to sleep. LINDA Brown notified. Orders for benadryl received, see mar. Telesitter initiated.

## 2023-04-28 NOTE — THERAPY
Physical Therapy   Daily Treatment     Patient Name: Taran Ornelas  Age:  82 y.o., Sex:  male  Medical Record #: [id number]  Today's Date: 4/28/2023     Precautions  Precautions: Fall Risk;Weight Bearing As Tolerated Left Lower Extremity;Immobilizer Left Lower Extremity;Posterior Hip Precautions    Assessment  Pt seen for PT tx session. Pt agreeable to mobilize OOB, however he needed repeated encouragement to stand and ambulate with therapist. Pt educated on importance of continued time OOB and sitting in the chair, however the pt could not tolerate sitting up due to hip pain and laid back in bed. Continue to recommend placement and increased time OOB with [facility] staff. Will follow.     Plan    Treatment Plan Status: Continue Current Treatment Plan  Type of Treatment: Bed Mobility, Equipment, Gait Training, Neuro Re-Education / Balance, Self Care / Home Evaluation, Stair Training, Therapeutic Activities, Therapeutic Exercise, Orthotics Training   Treatment Frequency: 4 Times per Week  Treatment Duration: Until Therapy Goals Met    DC Equipment Recommendations: Unable to determine at this time  Discharge Recommendations: Recommend post-acute placement for additional physical therapy services prior to discharge home        Vitals   Pulse Oximetry 93 %   O2 (LPM) 1   O2 Delivery Device Silicone Nasal Cannula   Pain 0 - 10 Group   Location Hip   Location Orientation Left   Pain Rating Scale (NPRS)   (not quantified)   Description Aching   Therapist Pain Assessment During Activity   Cognition    Comments needs encouragement to participate   Balance   Sitting Balance (Static) Fair +   Sitting Balance (Dynamic) Fair   Standing Balance (Static) Fair -   Standing Balance (Dynamic) Fair -   Weight Shift Sitting Fair   Weight Shift Standing Fair   Skilled Intervention Verbal Cuing   Comments FWW   Bed Mobility    Supine to Sit Minimal Assist   Sit to Supine Standby Assist   Scooting Standby Assist   Rolling Standby Assist   Skilled  Intervention Verbal Cuing   Comments incr time/effort   Gait Analysis   Gait Level Of Assist Contact Guard Assist   Assistive Device Front Wheel Walker   Distance (Feet) 40   # of Times Distance was Traveled 1   Deviation Bradykinetic   Weight Bearing Status WBAT LLE with knee immobilizer   Skilled Intervention Verbal Cuing   Comments distance limited by pain/instability. pt slightly shaky at times   Functional Mobility   Sit to Stand Minimal Assist   Bed, Chair, Wheelchair Transfer Minimal Assist   Transfer Method Stand Pivot   Mobility FWW   Skilled Intervention Verbal Cuing;Facilitation   How much difficulty does the patient currently have...   Turning over in bed (including adjusting bedclothes, sheets and blankets)? 2   Sitting down on and standing up from a chair with arms (e.g., wheelchair, bedside commode, etc.) 2   Moving from lying on back to sitting on the side of the bed? 2   How much help from another person does the patient currently need...   Moving to and from a bed to a chair (including a wheelchair)? 3   Need to walk in a hospital room? 3   Climbing 3-5 steps with a railing? 1   6 clicks Mobility Score 13   Activity Tolerance   Sitting in Chair 5 min   Sitting Edge of Bed 10 min   Standing 4 min   Comments limited by pain and fatigue   Short Term Goals    Short Term Goal # 1 pt will perform supine <> sit with HOB flat, no railing and SPV in 6 visits   Goal Outcome # 1 goal not met   Short Term Goal # 2 pt will perform sit <> stand and functional transfers with LRAD and SPV to improve mobility independence in 6 visits   Goal Outcome # 2 Goal not met   Short Term Goal # 3 pt will ambulate > 100 ft with LRAD and SPV to access home environment in 6 visits   Goal Outcome # 3 Goal not met   Short Term Goal # 4 pt will negotiate 4-5 steps with railing support and SPV to enter/exit home in 6 visits   Goal Outcome # 4 Goal not met   Short Term Goal # 5 pt will check and adjust knee immobilizer independently  prior to mobility to ensure safety and reduce potential for repeat dislocation in 6 visits   Goal Outcome # 5 Goal not met   Physical Therapy Treatment Plan   Physical Therapy Treatment Plan Continue Current Treatment Plan   Anticipated Discharge Equipment and Recommendations   DC Equipment Recommendations Unable to determine at this time   Discharge Recommendations Recommend post-acute placement for additional physical therapy services prior to discharge home   Interdisciplinary Plan of Care Collaboration   IDT Collaboration with  Nursing   Patient Position at End of Therapy In Bed;Bed Alarm On;Call Light within Reach;Tray Table within Reach;Phone within Reach   Collaboration Comments RN updated   Session Information   Date / Session Number  4/28- 3 (3/4, 5/1)

## 2023-04-28 NOTE — PROGRESS NOTES
Bedside report received and patient care assumed. Pt is resting in bed without signs of pain, and is on 3L. Tele box on. All fall precautions are in place, belongings at bedside table.  Pt call light within reach, tele sitter bedside.

## 2023-04-29 NOTE — PROGRESS NOTES
Hospital Medicine Daily Progress Note    Date of Service  4/28/2023    Chief Complaint  Taran Ornelas is a 82 y.o. male admitted 4/24/2023 with ground level fall    Hospital Course  Mr. Taran Ornelas is a 82 y.o. male with a past medical history significant for CAD, MI, HTN, gout, left hip and knee replacement. who presented on 4/204/2023 after ground-level fall at home onto his left hip with inability to move his left hip.      Patient reports that he was walking in his house when his left knee gave out having him fall on his left hip.  Patient denies any syncopal episodes, or dizziness prior to falling.  Patient does report that he was having some orthostatic episodes but he would wait for them to pass prior to walking which has been going on for few weeks.  Patient reports that he has a history of multiple falls due to his left knee not holding his weight.  He reports that when he fell this time he landed on his left hip with inability to move the left leg or get up.  Denies any head trauma, or trauma to any other parts of the body.  Did not lose consciousness.    In ER, patient noted to have normal vital signs.  Notable lab findings include RBC at 3.10, hemoglobin 9.9, hematocrit 31.1, glucose 115, BUN 54, creatinine 2.82, GFR 21, calcium 8.3, INR 1.26, PT 15.6.  X-ray left femur notes no acute displaced fracture of the left femur but there is a left superior hip arthroplasty dislocation noted.  Chest x-ray notes left basilar opacity likely representative of consolidation or contusion with probable small effusion versus pleural thickening with moderate enlargement of the cardiomediastinal silhouette.  Patient admitted with hospital medicine for management of TAMMI, postacute evaluation with PT, and disposition planning.    Interval Problem Update  -Patient seen and examined.  Noted severe bruising on left leg.  Patient reports left hip pain.  Discussed with patient plan of care including continuation of hydration  due to TAMMI.  Continuation PT evaluation, more likely patient will go to a skilled facility for continued therapy  -Plan of care: Continue pain management for left hip pain; continuation IV fluids due to TAMMI; will likely need placement postacute for continued therapy  -Disposition: Patient switched to inpatient status as he is anticipated to stay 2-3 midnights for management of TAMMI, evaluation for placement for continued physical therapy due to left hip dislocation  -Lab work: Reviewed; expected  -VSS at this time        4/26 In bed, family at bedside, no fever or chills on 2L of o2, normally he does not wear any o2, denies chest pain or sob no palpitation, not in distress, cxr reviewed cephalization, I have ordered ekg, will also order echo, bnp, I have discussed with patient patient's family , all questions answered. Patient having falls as outpatient bp meds recently adjusted.   4/27 In bed, he was sleeping I woke him up, when I pressed on his abdomen he c/o pain, ct abdomen done last night reviewed no acute finding, cholelithiasis but no signs of inflammation, lipase is 111 will add antiacid meds, lft's ordered and reviewed they are wnl. Discussed with patient's family, all questions answered.  Addendum: patient having more sob and restless, I have ordered cxr and one time dose of iv lasix. Previous cxr showed cephalization and congestion.   Cxr today reviewed my reading increasing pulmonary congestion/edema, echo showed EF 55%, RVSP 70-75 likely pulmonary htn. Will transfer to telemetry. I have seen and examined pt, rales on lung exam, no wheezing, lower ext edema 1+. Decreased b/l air entry, will add IS. Lasix received will f/u urine output will get bladder scan to make sure he is voiding.   4/28/2023:  Patient does have significant component of pulmonary hypertension patient does have RVSP of approximately 70.  Patient does have elevated brain natruretic peptide approximate 27,000.  Have readministered patient  with Lasix today 80 mg one-time challenge dose we will place Lasix on starting 4/29/2020 2340 mg IV twice daily.  Continue supportive measures.  Patient will require placement.  Discussion was had with the patient and family at bedside patient's 2 daughters.  All questions were answered in detail.        I have discussed this patient's plan of care and discharge plan at IDT rounds today with Case Management, Nursing, Nursing leadership, and other members of the IDT team.    Consultants/Specialty  NONE    Code Status  Full Code    Disposition  Patient is not medically cleared for discharge.   Anticipate discharge to to skilled nursing facility.  I have placed the appropriate orders for post-discharge needs.    Review of Systems  Review of Systems   Constitutional:  Positive for malaise/fatigue. Negative for chills and fever.   HENT: Negative.     Eyes: Negative.    Respiratory: Negative.     Cardiovascular: Negative.    Gastrointestinal:  Positive for abdominal pain.   Genitourinary: Negative.    Musculoskeletal:  Positive for falls and joint pain. Negative for back pain and myalgias.   Skin: Negative.    Neurological:  Positive for weakness.   Endo/Heme/Allergies: Negative.    Psychiatric/Behavioral: Negative.        Physical Exam  Temp:  [36.5 °C (97.7 °F)-37.2 °C (99 °F)] 36.7 °C (98.1 °F)  Pulse:  [61-88] 84  Resp:  [16-20] 20  BP: (148-179)/() 148/66  SpO2:  [83 %-98 %] 93 %    Physical Exam  Vitals and nursing note reviewed.   Constitutional:       Appearance: He is obese. He is ill-appearing.   HENT:      Head: Normocephalic.      Nose: Nose normal.   Eyes:      General: No scleral icterus.        Right eye: No discharge.         Left eye: No discharge.   Cardiovascular:      Rate and Rhythm: Normal rate and regular rhythm.      Pulses: Normal pulses.      Heart sounds: Normal heart sounds.   Pulmonary:      Effort: Pulmonary effort is normal.      Breath sounds: Normal breath sounds.   Abdominal:       General: Bowel sounds are normal. There is no distension.      Palpations: Abdomen is soft.      Tenderness: There is abdominal tenderness. There is no guarding or rebound.   Musculoskeletal:         General: Tenderness present.      Cervical back: Normal range of motion and neck supple.   Skin:     General: Skin is dry.      Capillary Refill: Capillary refill takes 2 to 3 seconds.   Neurological:      Mental Status: He is alert. Mental status is at baseline.      Motor: Weakness present.      Gait: Gait abnormal.   Psychiatric:         Mood and Affect: Mood normal.         Behavior: Behavior normal.       Fluids    Intake/Output Summary (Last 24 hours) at 4/28/2023 1835  Last data filed at 4/28/2023 1733  Gross per 24 hour   Intake 650 ml   Output 4150 ml   Net -3500 ml         Laboratory  Recent Labs     04/26/23 0133 04/27/23 0054 04/28/23  1017   WBC 5.6 5.6 5.3   RBC 2.90* 2.89* 2.99*   HEMOGLOBIN 9.5* 9.4* 9.7*   HEMATOCRIT 29.0* 28.2* 29.6*   .0* 97.6 99.0*   MCH 32.8 32.5 32.4   MCHC 32.8* 33.3* 32.8*   RDW 49.2 47.8 48.2   PLATELETCT 108* 113* 128*   MPV 9.5 9.3 9.5       Recent Labs     04/26/23 0133 04/27/23  0054 04/28/23  1017   SODIUM 143 143 143   POTASSIUM 3.9 3.6 3.2*   CHLORIDE 112 112 110   CO2 20 22 24   GLUCOSE 97 119* 142*   BUN 48* 38* 27*   CREATININE 1.90* 1.39 1.17   CALCIUM 8.0* 8.4* 8.6                       Imaging  DX-CHEST-LIMITED (1 VIEW)   Final Result         Stable chest x-ray findings.      EC-ECHOCARDIOGRAM COMPLETE W/O CONT   Final Result      CT-ABDOMEN-PELVIS W/O   Final Result         1.  Small layering bilateral pleural effusions, right greater than left.   2.  Hazy linear densities in bilateral lung bases suggesting atelectasis, component of infiltrate not excluded   3.  Cholelithiasis   4.  Bladder stones   5.  Atherosclerosis and atherosclerotic coronary artery disease      DX-PELVIS-1 OR 2 VIEWS   Final Result      Successful reduction of dislocated left hip  arthroplasty.      DX-PELVIS-1 OR 2 VIEWS   Final Result      1.  There is a superior left hip arthroplasty dislocation.      DX-CHEST-PORTABLE (1 VIEW)   Final Result      1.  Left basilar opacity which may represent consolidation or contusion with probable small effusion versus pleural thickening.   2.  Moderate enlargement of the cardiomediastinal silhouette.      DX-FEMUR-2+ LEFT   Final Result      1.  No acute displaced fracture of the left femur.   2.  There is a left superior hip arthroplasty dislocation.             Assessment/Plan  * TAMMI (acute kidney injury) (HCC)- (present on admission)  Assessment & Plan  Creatinine 2.82 BUN 54 on admission  -Unknown baseline was normal approximately 5 years ago which was last results and laboratory.  Patient reports Dr. Taylor discontinued his Benzapril today 2/2 to concerns about Kidney Function but was waiting on labs.   Stopped ivf  Cr improved  Resolving     Hip dislocation, left (HCC)  Assessment & Plan  Noted on XRAY     1.  No acute displaced fracture of the left femur.  2.  There is a left superior hip arthroplasty dislocation.    -Continue PT/OT  snf recommended.     Ground-level fall  Assessment & Plan  Patient brought to ED for trauma secondary to ground-level fall with left hip arthroplasty dislocation.  Patient reports mechanical fall with left knee which is also prosthetic giving out on him.  He had this happen once before approximately 4 to 5 years ago with reduction of the same hip.  -Patient underwent successful left hip reduction in ED.  Improved pain and movement following reduction.  -Fall precautions  -PT eval  -Recommend outpatient orthopedic follow-up    Monitoring.   Pt/ot recommending snf.       Orthostatic dizziness  Assessment & Plan  Patient reports a recent history of orthostatic dizziness upon standing.  Was told by his PCP to discontinue his Benzapril, as well as instructed to stand and wait 20 to 30 seconds prior to walking.  -This fall  not related to orthostatic, patient reports left knee giving out while walking  -Patient's BP improved after 1 L bolus in ED  -Patient currently on tamsulosin 0.8 for BPH  -PT eval, Recommend F/U with PCP, Consider stopping Tamsulosin.     Improved  continue monitoring  Stopped fluids.   Monitoring.  ekg my reading SR, no ischemic changes no st segment changes.     Edema- (present on admission)  Assessment & Plan  Lower ext  Patient had old echo as outpatient showed normal ef 55-60 on 2020  Will check bnp  cxr my reading increased cephalization, if bnp elevated will get echo.  Echo pending.     Paroxysmal atrial fibrillation (HCC)- (present on admission)  Assessment & Plan  On metoprolol   Not on any a/c.   Due to h/o fall patient probably is high risk for a/c.   EKG SR.     Benign essential hypertension- (present on admission)  Assessment & Plan  Patient with episode of orthostatic hypotension in the emergency department.    -Benazepril held secondary to TAMMI  -Continue metoprolol XL 12.5 mg  -Continue atorvastatin 80 mg daily    bp stable. Continue monitiring       Greater than 51 minutes spent prepping to see patient (e.g. review of tests) obtaining and/or reviewing separately obtained history. Performing a medically appropriate examination and/ evaluation.  Counseling and educating the patient/family/caregiver.  Ordering medications, tests, or procedures.  Referring and communicating with other health care professionals.  Documenting clinical information in EPIC.  Independently interpreting results and communicating results to patient/family/caregiver.  Care coordination.    Please note that this dictation was created using voice recognition software. I have made every reasonable attempt to correct obvious errors, but I expect that there are errors of grammar and possibly context that I did not discover before finalizing the note.    VTE prophylaxis: heparin ppx    I have performed a physical exam and reviewed  and updated ROS and Plan today (4/28/2023). In review of yesterday's note (4/27/2023), there are no changes except as documented above.

## 2023-04-29 NOTE — CARE PLAN
The patient is Stable - Low risk of patient condition declining or worsening    Shift Goals  Clinical Goals: DC planning  Patient Goals: urinate without mess  Family Goals: dea      Problem: Fall Risk  Goal: Patient will remain free from falls  Outcome: Progressing         Problem: Knowledge Deficit - Standard  Goal: Patient and family/care givers will demonstrate understanding of plan of care, disease process/condition, diagnostic tests and medications  Outcome: Not Met

## 2023-04-29 NOTE — CARE PLAN
Problem: Knowledge Deficit - Standard  Goal: Patient and family/care givers will demonstrate understanding of plan of care, disease process/condition, diagnostic tests and medications  Outcome: Progressing     Problem: Pain - Standard  Goal: Alleviation of pain or a reduction in pain to the patient’s comfort goal  Outcome: Progressing     Problem: Skin Integrity  Goal: Skin integrity is maintained or improved  Outcome: Progressing   The patient is Watcher - Medium risk of patient condition declining or worsening    Shift Goals  Clinical Goals: mobility, placement post d/c  Patient Goals: urinate without mess  Family Goals: dea    Progress made toward(s) clinical / shift goals:  pt pain progressing, continue to reposition. Continue frequent rounding for urinary frequency to minimize fall risk. Continue to re-orient pt during interactions and educate on use of call light.    Patient is not progressing towards the following goals:      Problem: Fall Risk  Goal: Patient will remain free from falls  Outcome: Not Met

## 2023-04-29 NOTE — PROGRESS NOTES
No assessment changes. Pt continues to frequently need repositioning and help using the bathroom. Bed alarm on and call light within reach. Pt rounded on hourly. Pt refuses SCD's

## 2023-04-29 NOTE — PROGRESS NOTES
Hospital Medicine Daily Progress Note    Date of Service  4/29/2023    Chief Complaint  Taran Ornelas is a 82 y.o. male admitted 4/24/2023 with ground level fall    Hospital Course  Mr. Taran Ornelas is a 82 y.o. male with a past medical history significant for CAD, MI, HTN, gout, left hip and knee replacement. who presented on 4/204/2023 after ground-level fall at home onto his left hip with inability to move his left hip.      Patient reports that he was walking in his house when his left knee gave out having him fall on his left hip.  Patient denies any syncopal episodes, or dizziness prior to falling.  Patient does report that he was having some orthostatic episodes but he would wait for them to pass prior to walking which has been going on for few weeks.  Patient reports that he has a history of multiple falls due to his left knee not holding his weight.  He reports that when he fell this time he landed on his left hip with inability to move the left leg or get up.  Denies any head trauma, or trauma to any other parts of the body.  Did not lose consciousness.    In ER, patient noted to have normal vital signs.  Notable lab findings include RBC at 3.10, hemoglobin 9.9, hematocrit 31.1, glucose 115, BUN 54, creatinine 2.82, GFR 21, calcium 8.3, INR 1.26, PT 15.6.  X-ray left femur notes no acute displaced fracture of the left femur but there is a left superior hip arthroplasty dislocation noted.  Chest x-ray notes left basilar opacity likely representative of consolidation or contusion with probable small effusion versus pleural thickening with moderate enlargement of the cardiomediastinal silhouette.  Patient admitted with hospital medicine for management of TAMMI, postacute evaluation with PT, and disposition planning.    Interval Problem Update  -Patient seen and examined.  Noted severe bruising on left leg.  Patient reports left hip pain.  Discussed with patient plan of care including continuation of hydration  due to TAMMI.  Continuation PT evaluation, more likely patient will go to a skilled facility for continued therapy  -Plan of care: Continue pain management for left hip pain; continuation IV fluids due to TAMMI; will likely need placement postacute for continued therapy  -Disposition: Patient switched to inpatient status as he is anticipated to stay 2-3 midnights for management of TAMMI, evaluation for placement for continued physical therapy due to left hip dislocation  -Lab work: Reviewed; expected  -VSS at this time        4/26 In bed, family at bedside, no fever or chills on 2L of o2, normally he does not wear any o2, denies chest pain or sob no palpitation, not in distress, cxr reviewed cephalization, I have ordered ekg, will also order echo, bnp, I have discussed with patient patient's family , all questions answered. Patient having falls as outpatient bp meds recently adjusted.   4/27 In bed, he was sleeping I woke him up, when I pressed on his abdomen he c/o pain, ct abdomen done last night reviewed no acute finding, cholelithiasis but no signs of inflammation, lipase is 111 will add antiacid meds, lft's ordered and reviewed they are wnl. Discussed with patient's family, all questions answered.  Addendum: patient having more sob and restless, I have ordered cxr and one time dose of iv lasix. Previous cxr showed cephalization and congestion.   Cxr today reviewed my reading increasing pulmonary congestion/edema, echo showed EF 55%, RVSP 70-75 likely pulmonary htn. Will transfer to telemetry. I have seen and examined pt, rales on lung exam, no wheezing, lower ext edema 1+. Decreased b/l air entry, will add IS. Lasix received will f/u urine output will get bladder scan to make sure he is voiding.   4/28/2023:  Patient does have significant component of pulmonary hypertension patient does have RVSP of approximately 70.  Patient does have elevated brain natruretic peptide approximate 27,000.  Have readministered patient  with Lasix today 80 mg one-time challenge dose we will place Lasix on starting 4/29/2020 2340 mg IV twice daily.  Continue supportive measures.  Patient will require placement.  Discussion was had with the patient and family at bedside patient's 2 daughters.  All questions were answered in detail.    4/29/2023:  Patient still intermittently attempted to get out of bed without assistance.  Applied Seroquel to patient today patient had improvement after Seroquel.  Furthermore patient continued to have good urinary output with diuresis.  Patient almost titrated off supplemental oxygen.  Discussed findings and details with patient to daughter at bedside.  Continue present medical management anticipate discharge 1 or 2 days.    I have discussed this patient's plan of care and discharge plan at IDT rounds today with Case Management, Nursing, Nursing leadership, and other members of the IDT team.    Consultants/Specialty  NONE    Code Status  Full Code    Disposition  Patient is not medically cleared for discharge.   Anticipate discharge to to skilled nursing facility.  I have placed the appropriate orders for post-discharge needs.    Review of Systems  Review of Systems   Constitutional:  Positive for malaise/fatigue. Negative for chills and fever.   HENT: Negative.     Eyes: Negative.    Respiratory: Negative.     Cardiovascular: Negative.    Gastrointestinal:  Positive for abdominal pain.   Genitourinary: Negative.    Musculoskeletal:  Positive for falls and joint pain. Negative for back pain and myalgias.   Skin: Negative.    Neurological:  Positive for weakness.   Endo/Heme/Allergies: Negative.    Psychiatric/Behavioral: Negative.        Physical Exam  Temp:  [36.5 °C (97.7 °F)-37.3 °C (99.1 °F)] 37.3 °C (99.1 °F)  Pulse:  [84-95] 95  Resp:  [16-20] 18  BP: (137-179)/() 137/71  SpO2:  [92 %-95 %] 95 %    Physical Exam  Vitals and nursing note reviewed.   Constitutional:       Appearance: He is obese. He is  ill-appearing.   HENT:      Head: Normocephalic.      Nose: Nose normal.   Eyes:      General: No scleral icterus.        Right eye: No discharge.         Left eye: No discharge.   Cardiovascular:      Rate and Rhythm: Normal rate and regular rhythm.      Pulses: Normal pulses.      Heart sounds: Normal heart sounds.   Pulmonary:      Effort: Pulmonary effort is normal.      Breath sounds: Normal breath sounds.   Abdominal:      General: Bowel sounds are normal. There is no distension.      Palpations: Abdomen is soft.      Tenderness: There is abdominal tenderness. There is no guarding or rebound.   Musculoskeletal:         General: Tenderness present.      Cervical back: Normal range of motion and neck supple.   Skin:     General: Skin is dry.      Capillary Refill: Capillary refill takes 2 to 3 seconds.   Neurological:      Mental Status: He is alert. Mental status is at baseline.      Motor: Weakness present.      Gait: Gait abnormal.   Psychiatric:         Mood and Affect: Mood normal.         Behavior: Behavior normal.       Fluids    Intake/Output Summary (Last 24 hours) at 4/29/2023 1521  Last data filed at 4/29/2023 1403  Gross per 24 hour   Intake 460 ml   Output 2700 ml   Net -2240 ml         Laboratory  Recent Labs     04/27/23  0054 04/28/23  1017   WBC 5.6 5.3   RBC 2.89* 2.99*   HEMOGLOBIN 9.4* 9.7*   HEMATOCRIT 28.2* 29.6*   MCV 97.6 99.0*   MCH 32.5 32.4   MCHC 33.3* 32.8*   RDW 47.8 48.2   PLATELETCT 113* 128*   MPV 9.3 9.5       Recent Labs     04/27/23  0054 04/28/23  1017 04/29/23  0501   SODIUM 143 143 149*   POTASSIUM 3.6 3.2* 3.2*   CHLORIDE 112 110 110   CO2 22 24 27   GLUCOSE 119* 142* 120*   BUN 38* 27* 24*   CREATININE 1.39 1.17 1.22   CALCIUM 8.4* 8.6 8.9                       Imaging  DX-CHEST-LIMITED (1 VIEW)   Final Result         Stable chest x-ray findings.      EC-ECHOCARDIOGRAM COMPLETE W/O CONT   Final Result      CT-ABDOMEN-PELVIS W/O   Final Result         1.  Small layering  bilateral pleural effusions, right greater than left.   2.  Hazy linear densities in bilateral lung bases suggesting atelectasis, component of infiltrate not excluded   3.  Cholelithiasis   4.  Bladder stones   5.  Atherosclerosis and atherosclerotic coronary artery disease      DX-PELVIS-1 OR 2 VIEWS   Final Result      Successful reduction of dislocated left hip arthroplasty.      DX-PELVIS-1 OR 2 VIEWS   Final Result      1.  There is a superior left hip arthroplasty dislocation.      DX-CHEST-PORTABLE (1 VIEW)   Final Result      1.  Left basilar opacity which may represent consolidation or contusion with probable small effusion versus pleural thickening.   2.  Moderate enlargement of the cardiomediastinal silhouette.      DX-FEMUR-2+ LEFT   Final Result      1.  No acute displaced fracture of the left femur.   2.  There is a left superior hip arthroplasty dislocation.             Assessment/Plan  * TAMMI (acute kidney injury) (HCC)- (present on admission)  Assessment & Plan  Creatinine 2.82 BUN 54 on admission  -Unknown baseline was normal approximately 5 years ago which was last results and laboratory.  Patient reports Dr. Taylor discontinued his Benzapril today 2/2 to concerns about Kidney Function but was waiting on labs.   Stopped ivf  Cr improved  Resolving     Hip dislocation, left (HCC)  Assessment & Plan  Noted on XRAY     1.  No acute displaced fracture of the left femur.  2.  There is a left superior hip arthroplasty dislocation.    -Continue PT/OT  snf recommended.     Ground-level fall  Assessment & Plan  Patient brought to ED for trauma secondary to ground-level fall with left hip arthroplasty dislocation.  Patient reports mechanical fall with left knee which is also prosthetic giving out on him.  He had this happen once before approximately 4 to 5 years ago with reduction of the same hip.  -Patient underwent successful left hip reduction in ED.  Improved pain and movement following  reduction.  -Fall precautions  -PT eval  -Recommend outpatient orthopedic follow-up    Monitoring.   Pt/ot recommending snf.       Orthostatic dizziness  Assessment & Plan  Patient reports a recent history of orthostatic dizziness upon standing.  Was told by his PCP to discontinue his Benzapril, as well as instructed to stand and wait 20 to 30 seconds prior to walking.  -This fall not related to orthostatic, patient reports left knee giving out while walking  -Patient's BP improved after 1 L bolus in ED  -Patient currently on tamsulosin 0.8 for BPH  -PT eval, Recommend F/U with PCP, Consider stopping Tamsulosin.     Improved  continue monitoring  Stopped fluids.   Monitoring.  ekg my reading SR, no ischemic changes no st segment changes.     Edema- (present on admission)  Assessment & Plan  Lower ext  Patient had old echo as outpatient showed normal ef 55-60 on 2020  Will check bnp  cxr my reading increased cephalization, if bnp elevated will get echo.  Echo pending.     Paroxysmal atrial fibrillation (HCC)- (present on admission)  Assessment & Plan  On metoprolol   Not on any a/c.   Due to h/o fall patient probably is high risk for a/c.   EKG SR.     Benign essential hypertension- (present on admission)  Assessment & Plan  Patient with episode of orthostatic hypotension in the emergency department.    -Benazepril held secondary to TAMMI  -Continue metoprolol XL 12.5 mg  -Continue atorvastatin 80 mg daily    bp stable. Continue monitiring       Greater than 51 minutes spent prepping to see patient (e.g. review of tests) obtaining and/or reviewing separately obtained history. Performing a medically appropriate examination and/ evaluation.  Counseling and educating the patient/family/caregiver.  Ordering medications, tests, or procedures.  Referring and communicating with other health care professionals.  Documenting clinical information in EPIC.  Independently interpreting results and communicating results to  patient/family/caregiver.  Care coordination.    Please note that this dictation was created using voice recognition software. I have made every reasonable attempt to correct obvious errors, but I expect that there are errors of grammar and possibly context that I did not discover before finalizing the note.    VTE prophylaxis: heparin ppx    I have performed a physical exam and reviewed and updated ROS and Plan today (4/29/2023). In review of yesterday's note (4/28/2023), there are no changes except as documented above.

## 2023-04-29 NOTE — PROGRESS NOTES
Received report from NOC shift RN. Patient is A&Ox2, oriented to person and place. VSS, no signs of distress. Sitter at bedside. All needs met at this time, bed alarm on, call light in reach, will continue to monitor.

## 2023-04-30 NOTE — PROGRESS NOTES
Pt has developed prolonged qt of 0.5. LUIS ANGEL Brown notified. PA to review chart and possibly place new orders. Pt vitals within normal ranges. Will continue to monitor.

## 2023-04-30 NOTE — PROGRESS NOTES
No assessment changes. Sitter at bedside. Vitals within normal limits. Pt needs frequent toileting. Will continue to frequently round on pt.

## 2023-04-30 NOTE — CARE PLAN
The patient is Stable - Low risk of patient condition declining or worsening    Shift Goals  Clinical Goals: increase orientation, vital sign management  Patient Goals: sleep without discomfort  Family Goals: dea    Progress made toward(s) clinical / shift goals:  Pt remains in pain and restless. Seroquel on board to help with sleep and restlessness now. Frequent urinary urge still present. Sitter present to help with pt restlessness and constant getting up to urinate.    Patient is not progressing towards the following goals:      Problem: Fall Risk  Goal: Patient will remain free from falls  Outcome: Not Progressing

## 2023-04-30 NOTE — PROGRESS NOTES
Monitor Summary       A-fib     Ocassional PVC's     - / 0.07 / .30

## 2023-04-30 NOTE — PROGRESS NOTES
Hospital Medicine Daily Progress Note    Date of Service  4/30/2023    Chief Complaint  Taran Ornelas is a 82 y.o. male admitted 4/24/2023 with ground level fall    Hospital Course  Mr. Taran Ornelas is a 82 y.o. male with a past medical history significant for CAD, MI, HTN, gout, left hip and knee replacement. who presented on 4/204/2023 after ground-level fall at home onto his left hip with inability to move his left hip.      Patient reports that he was walking in his house when his left knee gave out having him fall on his left hip.  Patient denies any syncopal episodes, or dizziness prior to falling.  Patient does report that he was having some orthostatic episodes but he would wait for them to pass prior to walking which has been going on for few weeks.  Patient reports that he has a history of multiple falls due to his left knee not holding his weight.  He reports that when he fell this time he landed on his left hip with inability to move the left leg or get up.  Denies any head trauma, or trauma to any other parts of the body.  Did not lose consciousness.    In ER, patient noted to have normal vital signs.  Notable lab findings include RBC at 3.10, hemoglobin 9.9, hematocrit 31.1, glucose 115, BUN 54, creatinine 2.82, GFR 21, calcium 8.3, INR 1.26, PT 15.6.  X-ray left femur notes no acute displaced fracture of the left femur but there is a left superior hip arthroplasty dislocation noted.  Chest x-ray notes left basilar opacity likely representative of consolidation or contusion with probable small effusion versus pleural thickening with moderate enlargement of the cardiomediastinal silhouette.  Patient admitted with hospital medicine for management of TAMMI, postacute evaluation with PT, and disposition planning.    Interval Problem Update  -Patient seen and examined.  Noted severe bruising on left leg.  Patient reports left hip pain.  Discussed with patient plan of care including continuation of hydration  due to TAMMI.  Continuation PT evaluation, more likely patient will go to a skilled facility for continued therapy  -Plan of care: Continue pain management for left hip pain; continuation IV fluids due to TAMMI; will likely need placement postacute for continued therapy  -Disposition: Patient switched to inpatient status as he is anticipated to stay 2-3 midnights for management of TAMMI, evaluation for placement for continued physical therapy due to left hip dislocation  -Lab work: Reviewed; expected  -VSS at this time        4/26 In bed, family at bedside, no fever or chills on 2L of o2, normally he does not wear any o2, denies chest pain or sob no palpitation, not in distress, cxr reviewed cephalization, I have ordered ekg, will also order echo, bnp, I have discussed with patient patient's family , all questions answered. Patient having falls as outpatient bp meds recently adjusted.   4/27 In bed, he was sleeping I woke him up, when I pressed on his abdomen he c/o pain, ct abdomen done last night reviewed no acute finding, cholelithiasis but no signs of inflammation, lipase is 111 will add antiacid meds, lft's ordered and reviewed they are wnl. Discussed with patient's family, all questions answered.  Addendum: patient having more sob and restless, I have ordered cxr and one time dose of iv lasix. Previous cxr showed cephalization and congestion.   Cxr today reviewed my reading increasing pulmonary congestion/edema, echo showed EF 55%, RVSP 70-75 likely pulmonary htn. Will transfer to telemetry. I have seen and examined pt, rales on lung exam, no wheezing, lower ext edema 1+. Decreased b/l air entry, will add IS. Lasix received will f/u urine output will get bladder scan to make sure he is voiding.   4/28/2023:  Patient does have significant component of pulmonary hypertension patient does have RVSP of approximately 70.  Patient does have elevated brain natruretic peptide approximate 27,000.  Have readministered patient  with Lasix today 80 mg one-time challenge dose we will place Lasix on starting 4/29/2020 2340 mg IV twice daily.  Continue supportive measures.  Patient will require placement.  Discussion was had with the patient and family at bedside patient's 2 daughters.  All questions were answered in detail.    4/29/2023:  Patient still intermittently attempted to get out of bed without assistance.  Applied Seroquel to patient today patient had improvement after Seroquel.  Furthermore patient continued to have good urinary output with diuresis.  Patient almost titrated off supplemental oxygen.  Discussed findings and details with patient to daughter at bedside.  Continue present medical management anticipate discharge 1 or 2 days.  4/30/2023:  Patient continued to have slow improvement has approximately 5.3 L of fluid out over the past 48 hours.  Continue present medical management patient is pending transition to skilled nursing facility on 5/1/2023.  Long discussion was had again with patient's family at bedside in detail with regards to the course of care.  Patient only requiring approximately 2 L of oxygen at present.  Continue to wean as tolerated.  I have discussed this patient's plan of care and discharge plan at IDT rounds today with Case Management, Nursing, Nursing leadership, and other members of the IDT team.    Consultants/Specialty  NONE    Code Status  Full Code    Disposition  Patient is not medically cleared for discharge.   Anticipate discharge to to skilled nursing facility.  I have placed the appropriate orders for post-discharge needs.    Review of Systems  Review of Systems   Constitutional:  Positive for malaise/fatigue. Negative for chills and fever.   HENT: Negative.     Eyes: Negative.    Respiratory: Negative.     Cardiovascular: Negative.    Gastrointestinal:  Positive for abdominal pain.   Genitourinary: Negative.    Musculoskeletal:  Positive for falls and joint pain. Negative for back pain and  myalgias.   Skin: Negative.    Neurological:  Positive for weakness.   Endo/Heme/Allergies: Negative.    Psychiatric/Behavioral: Negative.        Physical Exam  Temp:  [36.6 °C (97.9 °F)-36.9 °C (98.4 °F)] 36.6 °C (97.9 °F)  Pulse:  [80-88] 88  Resp:  [16] 16  BP: (115-157)/(65-90) 125/77  SpO2:  [90 %-96 %] 90 %    Physical Exam  Vitals and nursing note reviewed.   Constitutional:       Appearance: He is obese. He is ill-appearing.   HENT:      Head: Normocephalic.      Nose: Nose normal.   Eyes:      General: No scleral icterus.        Right eye: No discharge.         Left eye: No discharge.   Cardiovascular:      Rate and Rhythm: Normal rate and regular rhythm.      Pulses: Normal pulses.      Heart sounds: Normal heart sounds.   Pulmonary:      Effort: Pulmonary effort is normal.      Breath sounds: Normal breath sounds.   Abdominal:      General: Bowel sounds are normal. There is no distension.      Palpations: Abdomen is soft.      Tenderness: There is abdominal tenderness. There is no guarding or rebound.   Musculoskeletal:         General: Tenderness present.      Cervical back: Normal range of motion and neck supple.   Skin:     General: Skin is dry.      Capillary Refill: Capillary refill takes 2 to 3 seconds.   Neurological:      Mental Status: He is alert. Mental status is at baseline.      Motor: Weakness present.      Gait: Gait abnormal.   Psychiatric:         Mood and Affect: Mood normal.         Behavior: Behavior normal.       Fluids    Intake/Output Summary (Last 24 hours) at 4/30/2023 1609  Last data filed at 4/30/2023 1200  Gross per 24 hour   Intake 200 ml   Output 2125 ml   Net -1925 ml       Laboratory  Recent Labs     04/28/23  1017   WBC 5.3   RBC 2.99*   HEMOGLOBIN 9.7*   HEMATOCRIT 29.6*   MCV 99.0*   MCH 32.4   MCHC 32.8*   RDW 48.2   PLATELETCT 128*   MPV 9.5     Recent Labs     04/28/23  1017 04/29/23  0501 04/30/23  0037   SODIUM 143 149* 143   POTASSIUM 3.2* 3.2* 3.3*   CHLORIDE 110  110 98   CO2 24 27 32   GLUCOSE 142* 120* 127*   BUN 27* 24* 20   CREATININE 1.17 1.22 1.32   CALCIUM 8.6 8.9 9.7                     Imaging  DX-CHEST-LIMITED (1 VIEW)   Final Result         Stable chest x-ray findings.      EC-ECHOCARDIOGRAM COMPLETE W/O CONT   Final Result      CT-ABDOMEN-PELVIS W/O   Final Result         1.  Small layering bilateral pleural effusions, right greater than left.   2.  Hazy linear densities in bilateral lung bases suggesting atelectasis, component of infiltrate not excluded   3.  Cholelithiasis   4.  Bladder stones   5.  Atherosclerosis and atherosclerotic coronary artery disease      DX-PELVIS-1 OR 2 VIEWS   Final Result      Successful reduction of dislocated left hip arthroplasty.      DX-PELVIS-1 OR 2 VIEWS   Final Result      1.  There is a superior left hip arthroplasty dislocation.      DX-CHEST-PORTABLE (1 VIEW)   Final Result      1.  Left basilar opacity which may represent consolidation or contusion with probable small effusion versus pleural thickening.   2.  Moderate enlargement of the cardiomediastinal silhouette.      DX-FEMUR-2+ LEFT   Final Result      1.  No acute displaced fracture of the left femur.   2.  There is a left superior hip arthroplasty dislocation.             Assessment/Plan  * TAMMI (acute kidney injury) (HCC)- (present on admission)  Assessment & Plan  -Resolved  Continue to monitor renal function.  Continue to avoid nephrotoxic agents.  Patient with aggressive diuresis however creatinine still maintained with normal limits.    Hip dislocation, left (HCC)  Assessment & Plan  Noted on XRAY     1.  No acute displaced fracture of the left femur.  2.  There is a left superior hip arthroplasty dislocation.    -Continue PT/OT  snf recommended.     Ground-level fall  Assessment & Plan  Patient brought to ED for trauma secondary to ground-level fall with left hip arthroplasty dislocation.  Patient reports mechanical fall with left knee which is also  prosthetic giving out on him.  He had this happen once before approximately 4 to 5 years ago with reduction of the same hip.  -Patient underwent successful left hip reduction in ED.  Improved pain and movement following reduction.  -Fall precautions  -PT eval  -Recommend outpatient orthopedic follow-up    Monitoring.   Pt/ot recommending snf.  -Patient pending [facility].  Patient was had a hypotension seemingly resolving.      Orthostatic dizziness  Assessment & Plan  Patient reports a recent history of orthostatic dizziness upon standing.  Was told by his PCP to discontinue his Benzapril, as well as instructed to stand and wait 20 to 30 seconds prior to walking.  -This fall not related to orthostatic, patient reports left knee giving out while walking  -Patient's BP improved after 1 L bolus in ED  -Patient currently on tamsulosin 0.8 for BPH  -PT eval, Recommend F/U with PCP, Consider stopping Tamsulosin.     Improved  continue monitoring  Stopped fluids.   Monitoring.  ekg my reading SR, no ischemic changes no st segment changes.     Edema- (present on admission)  Assessment & Plan  Lower ext  Patient had old echo as outpatient showed normal ef 55-60 on 2020  Will check bnp  cxr my reading increased cephalization, if bnp elevated will get echo.  Echo pending.     Paroxysmal atrial fibrillation (HCC)- (present on admission)  Assessment & Plan  On metoprolol   Not on any a/c.   Due to h/o fall patient probably is high risk for a/c.   EKG SR.  Medically managed on metoprolol    Benign essential hypertension- (present on admission)  Assessment & Plan  After aggressive diuresis patient's blood pressures in the 120s over 70s much improved.  Continue with Coreg 6.25 mg twice daily.  Continue to hold on benazepril at present.  I have discontinued IV diuretic and placed patient on steady dose of torsemide       Please note that this dictation was created using voice recognition  software. I have made every reasonable attempt to correct obvious errors, but I expect that there are errors of grammar and possibly context that I did not discover before finalizing the note.    Greater than 51 minutes spent prepping to see patient (e.g. review of tests) obtaining and/or reviewing separately obtained history. Performing a medically appropriate examination and/ evaluation.  Counseling and educating the patient/family/caregiver.  Ordering medications, tests, or procedures.  Referring and communicating with other health care professionals.  Documenting clinical information in EPIC.  Independently interpreting results and communicating results to patient/family/caregiver.  Care coordination.    VTE prophylaxis: heparin ppx    I have performed a physical exam and reviewed and updated ROS and Plan today (4/30/2023). In review of yesterday's note (4/29/2023), there are no changes except as documented above.

## 2023-05-01 NOTE — PROGRESS NOTES
Bedside report received from NOC RN. Assumed care of pt. Pt awake, laying in bed. A/Ox2, VSS. No concerns, complaints or distress. Pt educated to call before getting out of bed. POC reviewed and white board updated. Tele box on. Afib 98 on the monitor. Call light in reach. Bed locked in lowest position with 2 upper bed rails up. Bed alarm on.

## 2023-05-01 NOTE — DISCHARGE PLANNING
RN CM notified PT/OT that patient will need updated PT/OT notes.    Patient denied by [facility] due to being OON.    Left voicemails for [facility] and [facility] about bed availability.    Per Jatin there is bed availability but needs updated PT/OT notes.    Natasha EPPS RN Case Manager  [phone]

## 2023-05-01 NOTE — THERAPY
Physical Therapy   Daily Treatment     Patient Name: Taran Ornelas  Age:  82 y.o., Sex:  male  Medical Record #: [id number]  Today's Date: 5/1/2023     Precautions  Precautions: (P) Fall Risk;Weight Bearing As Tolerated Left Lower Extremity;Immobilizer Left Lower Extremity;Posterior Hip Precautions    Assessment  Pt tolerated session well and continues to show improvements in functional mobility. He ambulated 80' with FWW and CGA x 2 bouts. He tolerated standing therex well with CGA throughout. He denied pain throughout session.     He will continue to benefit from discharge to post acute placement to fully maximize independence prior to discharge home.     Plan    Treatment Plan Status: (P) Continue Current Treatment Plan  Type of Treatment: Bed Mobility, Equipment, Gait Training, Neuro Re-Education / Balance, Self Care / Home Evaluation, Stair Training, Therapeutic Activities, Therapeutic Exercise, Orthotics Training   Treatment Frequency: 4 Times per Week  Treatment Duration: Until Therapy Goals Met    DC Equipment Recommendations: Unable to determine at this time  Discharge Recommendations: Recommend post-acute placement for additional physical therapy services prior to discharge home      Subjective    Pt asleep in bed however upon awakening agreeable to PT.      Objective     05/01/23 1536   Precautions   Precautions Fall Risk;Weight Bearing As Tolerated Left Lower Extremity;Immobilizer Left Lower Extremity;Posterior Hip Precautions   Standing Lower Body Exercises   Standing Lower Body Exercises Yes   Hip Flexion 2 sets of 15   Heel Rise 1 set of 15   Mini Squat 1 set of 10   Balance   Sitting Balance (Static) Fair +   Sitting Balance (Dynamic) Fair   Standing Balance (Static) Fair -   Standing Balance (Dynamic) Fair -   Weight Shift Sitting Fair   Weight Shift Standing Fair   Bed Mobility    Supine to Sit Standby Assist   Gait Analysis   Gait Level Of Assist Contact Guard Assist   Assistive Device Front Wheel  Walker   Distance (Feet) 60   # of Times Distance was Traveled 2   Deviation Bradykinetic   Comments Pt ambulated 2 separate bouts of 60' with FWW and CGA. He demonstrates slow steady gait pattern. Mild instabilty during turning however no significant losses of balance.   Functional Mobility   Sit to Stand Contact Guard Assist   Comments Pt performed sit<>stand 2x5   How much difficulty does the patient currently have...   Turning over in bed (including adjusting bedclothes, sheets and blankets)? 2   Sitting down on and standing up from a chair with arms (e.g., wheelchair, bedside commode, etc.) 3   Moving from lying on back to sitting on the side of the bed? 3   How much help from another person does the patient currently need...   Moving to and from a bed to a chair (including a wheelchair)? 3   Need to walk in a hospital room? 3   Climbing 3-5 steps with a railing? 1   6 clicks Mobility Score 15   Short Term Goals    Short Term Goal # 1 pt will perform supine <> sit with HOB flat, no railing and SPV in 6 visits   Goal Outcome # 1 Progressing as expected   Short Term Goal # 2 pt will perform sit <> stand and functional transfers with LRAD and SPV to improve mobility independence in 6 visits   Goal Outcome # 2 Progressing as expected   Short Term Goal # 3 pt will ambulate > 100 ft with LRAD and SPV to access home environment in 6 visits   Goal Outcome # 3 Progressing as expected   Education Group   Education Provided Role of Physical Therapist   Role of Physical Therapist Patient Response Patient;Acceptance;Explanation;Demonstration;Action Demonstration   Physical Therapy Treatment Plan   Physical Therapy Treatment Plan Continue Current Treatment Plan   Interdisciplinary Plan of Care Collaboration   IDT Collaboration with  Nursing   Patient Position at End of Therapy Seated;Chair Alarm On;Call Light within Reach;Tray Table within Reach;Phone within Reach   Session Information   Date / Session Number  5/1- 4 (4/4,  5/1)

## 2023-05-01 NOTE — CARE PLAN
Problem: Fall Risk  Goal: Patient will remain free from falls  Outcome: Progressing     Problem: Pain - Standard  Goal: Alleviation of pain or a reduction in pain to the patient’s comfort goal  Outcome: Progressing

## 2023-05-01 NOTE — DISCHARGE PLANNING
0838  Agency/Facility Name: [facility]   Outcome: DPA left VM regarding referral with request of call back.

## 2023-05-02 NOTE — CARE PLAN
The patient is Stable - Low risk of patient condition declining or worsening    Shift Goals  Clinical Goals: VSS, 1500mL fluid restriction, monitor I&Os, increased appetite, pain mgmt, fall prevention, good night sleep  Patient Goals: good night sleep  Family Goals: dea    Progress made toward(s) clinical / shift goals:    Problem: Fall Risk  Goal: Patient will remain free from falls  Outcome: Progressing  Note: Interventions in place.  Pt will occasionally call, however this RN is greatly appreciative of tele sitter.     Problem: Skin Integrity  Goal: Skin integrity is maintained or improved  Outcome: Progressing  Note: Interventions in place.       Patient is not progressing towards the following goals:  Problem: Pain - Standard  Goal: Alleviation of pain or a reduction in pain to the patient’s comfort goal  Outcome: Not Progressing  Note: Pt appears to have chronic, incessant back pain, for which nothing seems to help.  Have attempted repositioning, increasing activity/ambulation, up to chair, back to bed, ice and heat packs, distraction, and tylenol ad nauseam.

## 2023-05-02 NOTE — PROGRESS NOTES
Monitor summary:        Rhythm: Afib/SR, converted to SR AT 1300  Rate: 78-90  Ectopy: (R)PVC, (r)COUP  Measurements: 17./.10/.36        12hr chart check

## 2023-05-02 NOTE — PROGRESS NOTES
Hospital Medicine Daily Progress Note    Date of Service  5/2/2023    Chief Complaint  Taran Ornelas is a 82 y.o. male admitted 4/24/2023 with ground level fall    Hospital Course  Mr. Taran Ornelas is a 82 y.o. male with a past medical history significant for CAD, MI, HTN, gout, left hip and knee replacement. who presented on 4/204/2023 after ground-level fall at home onto his left hip with inability to move his left hip.      Patient reports that he was walking in his house when his left knee gave out having him fall on his left hip.  Patient denies any syncopal episodes, or dizziness prior to falling.  Patient does report that he was having some orthostatic episodes but he would wait for them to pass prior to walking which has been going on for few weeks.  Patient reports that he has a history of multiple falls due to his left knee not holding his weight.  He reports that when he fell this time he landed on his left hip with inability to move the left leg or get up.  Denies any head trauma, or trauma to any other parts of the body.  Did not lose consciousness.    In ER, patient noted to have normal vital signs.  Notable lab findings include RBC at 3.10, hemoglobin 9.9, hematocrit 31.1, glucose 115, BUN 54, creatinine 2.82, GFR 21, calcium 8.3, INR 1.26, PT 15.6.  X-ray left femur notes no acute displaced fracture of the left femur but there is a left superior hip arthroplasty dislocation noted.  Chest x-ray notes left basilar opacity likely representative of consolidation or contusion with probable small effusion versus pleural thickening with moderate enlargement of the cardiomediastinal silhouette.  Patient admitted with hospital medicine for management of TAMMI, postacute evaluation with PT, and disposition planning.    Interval Problem Update  -Patient seen and examined.  Noted severe bruising on left leg.  Patient reports left hip pain.  Discussed with patient plan of care including continuation of hydration  due to TAMMI.  Continuation PT evaluation, more likely patient will go to a skilled facility for continued therapy  -Plan of care: Continue pain management for left hip pain; continuation IV fluids due to TAMMI; will likely need placement postacute for continued therapy  -Disposition: Patient switched to inpatient status as he is anticipated to stay 2-3 midnights for management of TAMMI, evaluation for placement for continued physical therapy due to left hip dislocation  -Lab work: Reviewed; expected  -VSS at this time        4/26 In bed, family at bedside, no fever or chills on 2L of o2, normally he does not wear any o2, denies chest pain or sob no palpitation, not in distress, cxr reviewed cephalization, I have ordered ekg, will also order echo, bnp, I have discussed with patient patient's family , all questions answered. Patient having falls as outpatient bp meds recently adjusted.   4/27 In bed, he was sleeping I woke him up, when I pressed on his abdomen he c/o pain, ct abdomen done last night reviewed no acute finding, cholelithiasis but no signs of inflammation, lipase is 111 will add antiacid meds, lft's ordered and reviewed they are wnl. Discussed with patient's family, all questions answered.  Addendum: patient having more sob and restless, I have ordered cxr and one time dose of iv lasix. Previous cxr showed cephalization and congestion.   Cxr today reviewed my reading increasing pulmonary congestion/edema, echo showed EF 55%, RVSP 70-75 likely pulmonary htn. Will transfer to telemetry. I have seen and examined pt, rales on lung exam, no wheezing, lower ext edema 1+. Decreased b/l air entry, will add IS. Lasix received will f/u urine output will get bladder scan to make sure he is voiding.   4/28/2023:  Patient does have significant component of pulmonary hypertension patient does have RVSP of approximately 70.  Patient does have elevated brain natruretic peptide approximate 27,000.  Have readministered patient  with Lasix today 80 mg one-time challenge dose we will place Lasix on starting 4/29/2020 2340 mg IV twice daily.  Continue supportive measures.  Patient will require placement.  Discussion was had with the patient and family at bedside patient's 2 daughters.  All questions were answered in detail.    4/29/2023:  Patient still intermittently attempted to get out of bed without assistance.  Applied Seroquel to patient today patient had improvement after Seroquel.  Furthermore patient continued to have good urinary output with diuresis.  Patient almost titrated off supplemental oxygen.  Discussed findings and details with patient to daughter at bedside.  Continue present medical management anticipate discharge 1 or 2 days.  4/30/2023:  Patient continued to have slow improvement has approximately 5.3 L of fluid out over the past 48 hours.  Continue present medical management patient is pending transition to skilled nursing facility on 5/1/2023.  Long discussion was had again with patient's family at bedside in detail with regards to the course of care.  Patient only requiring approximately 2 L of oxygen at present.  Continue to wean as tolerated.    5/2: Patient seen and examined, afebrile, resting in bed, cont on torsemide for diuresis and wean off O2 as tolerated  Needs placement and CM helping on arrangements     I have discussed this patient's plan of care and discharge plan at IDT rounds today with Case Management, Nursing, Nursing leadership, and other members of the IDT team.    Consultants/Specialty  NONE    Code Status  Full Code    Disposition  Patient is not medically cleared for discharge.   Anticipate discharge to to skilled nursing facility.  I have placed the appropriate orders for post-discharge needs.    Review of Systems  Review of Systems   Constitutional:  Positive for malaise/fatigue. Negative for chills and fever.   HENT: Negative.     Eyes: Negative.    Respiratory: Negative.     Cardiovascular:  Negative.    Gastrointestinal:  Positive for abdominal pain.   Genitourinary: Negative.    Musculoskeletal:  Positive for falls and joint pain. Negative for back pain and myalgias.   Skin: Negative.    Neurological:  Positive for weakness.   Endo/Heme/Allergies: Negative.    Psychiatric/Behavioral: Negative.        Physical Exam  Temp:  [36.6 °C (97.9 °F)-37 °C (98.6 °F)] 36.6 °C (97.9 °F)  Pulse:  [73-90] 73  Resp:  [16-17] 17  BP: (107-141)/(46-65) 141/65  SpO2:  [96 %-97 %] 96 %    Physical Exam  Vitals and nursing note reviewed.   Constitutional:       Appearance: He is obese. He is ill-appearing.   HENT:      Head: Normocephalic.      Nose: Nose normal.   Eyes:      General: No scleral icterus.        Right eye: No discharge.         Left eye: No discharge.   Cardiovascular:      Rate and Rhythm: Normal rate and regular rhythm.      Pulses: Normal pulses.      Heart sounds: Normal heart sounds.   Pulmonary:      Effort: Pulmonary effort is normal.      Breath sounds: Normal breath sounds.   Abdominal:      General: Bowel sounds are normal. There is no distension.      Palpations: Abdomen is soft.      Tenderness: There is abdominal tenderness. There is no guarding or rebound.   Musculoskeletal:         General: Tenderness present.      Cervical back: Normal range of motion and neck supple.   Skin:     General: Skin is dry.      Capillary Refill: Capillary refill takes 2 to 3 seconds.   Neurological:      Mental Status: He is alert. Mental status is at baseline.      Motor: Weakness present.      Gait: Gait abnormal.   Psychiatric:         Mood and Affect: Mood normal.         Behavior: Behavior normal.       Fluids    Intake/Output Summary (Last 24 hours) at 5/2/2023 1327  Last data filed at 5/2/2023 0400  Gross per 24 hour   Intake 472 ml   Output --   Net 472 ml         Laboratory        Recent Labs     04/30/23  0037   SODIUM 143   POTASSIUM 3.3*   CHLORIDE 98   CO2 32   GLUCOSE 127*   BUN 20   CREATININE  1.32   CALCIUM 9.7                       Imaging  DX-CHEST-LIMITED (1 VIEW)   Final Result         Stable chest x-ray findings.      EC-ECHOCARDIOGRAM COMPLETE W/O CONT   Final Result      CT-ABDOMEN-PELVIS W/O   Final Result         1.  Small layering bilateral pleural effusions, right greater than left.   2.  Hazy linear densities in bilateral lung bases suggesting atelectasis, component of infiltrate not excluded   3.  Cholelithiasis   4.  Bladder stones   5.  Atherosclerosis and atherosclerotic coronary artery disease      DX-PELVIS-1 OR 2 VIEWS   Final Result      Successful reduction of dislocated left hip arthroplasty.      DX-PELVIS-1 OR 2 VIEWS   Final Result      1.  There is a superior left hip arthroplasty dislocation.      DX-CHEST-PORTABLE (1 VIEW)   Final Result      1.  Left basilar opacity which may represent consolidation or contusion with probable small effusion versus pleural thickening.   2.  Moderate enlargement of the cardiomediastinal silhouette.      DX-FEMUR-2+ LEFT   Final Result      1.  No acute displaced fracture of the left femur.   2.  There is a left superior hip arthroplasty dislocation.             Assessment/Plan  * TAMMI (acute kidney injury) (HCC)- (present on admission)  Assessment & Plan  -Resolved  Continue to monitor renal function.  Continue to avoid nephrotoxic agents.  Patient with aggressive diuresis however creatinine still maintained with normal limits.    Hip dislocation, left (HCC)  Assessment & Plan  Noted on XRAY     1.  No acute displaced fracture of the left femur.  2.  There is a left superior hip arthroplasty dislocation.    -Continue PT/OT  snf recommended.     Ground-level fall  Assessment & Plan  Patient brought to ED for trauma secondary to ground-level fall with left hip arthroplasty dislocation.  Patient reports mechanical fall with left knee which is also prosthetic giving out on him.  He had this happen once before approximately 4 to 5 years ago with  reduction of the same hip.  -Patient underwent successful left hip reduction in ED.  Improved pain and movement following reduction.  -Fall precautions  -PT eval  -Recommend outpatient orthopedic follow-up    Monitoring.   Pt/ot recommending snf.  -Patient pending transition to skilled nursing facility [facility].  Patient was had a hypotension seemingly resolving.      Orthostatic dizziness  Assessment & Plan  Patient reports a recent history of orthostatic dizziness upon standing.  Was told by his PCP to discontinue his Benzapril, as well as instructed to stand and wait 20 to 30 seconds prior to walking.  -This fall not related to orthostatic, patient reports left knee giving out while walking  -Patient's BP improved after 1 L bolus in ED  -Patient currently on tamsulosin 0.8 for BPH  -PT eval, Recommend F/U with PCP, Consider stopping Tamsulosin.     Improved  continue monitoring  Stopped fluids.   Monitoring.  ekg my reading SR, no ischemic changes no st segment changes.     Edema- (present on admission)  Assessment & Plan  Lower ext  Patient had old echo as outpatient showed normal ef 55-60 on 2020  Will check bnp  cxr my reading increased cephalization, if bnp elevated will get echo.  Echo pending.     Paroxysmal atrial fibrillation (HCC)- (present on admission)  Assessment & Plan  On metoprolol   Not on any a/c.   Due to h/o fall patient probably is high risk for a/c.   EKG SR.  Medically managed on metoprolol    Benign essential hypertension- (present on admission)  Assessment & Plan  After aggressive diuresis patient's blood pressures in the 120s over 70s much improved.  Continue with Coreg 6.25 mg twice daily.  Continue to hold on benazepril at present.  I have discontinued IV diuretic and placed patient on steady dose of torsemide       Please note that this dictation was created using voice recognition software. I have made every reasonable attempt to correct obvious errors, but I expect that there are  errors of grammar and possibly context that I did not discover before finalizing the note.    Greater than 51 minutes spent prepping to see patient (e.g. review of tests) obtaining and/or reviewing separately obtained history. Performing a medically appropriate examination and/ evaluation.  Counseling and educating the patient/family/caregiver.  Ordering medications, tests, or procedures.  Referring and communicating with other health care professionals.  Documenting clinical information in EPIC.  Independently interpreting results and communicating results to patient/family/caregiver.  Care coordination.    VTE prophylaxis: heparin ppx    I have performed a physical exam and reviewed and updated ROS and Plan today (5/2/2023). In review of yesterday's note (5/1/2023), there are no changes except as documented above.

## 2023-05-02 NOTE — DIETARY
Nutrition services: Day 7 of admit.  Taran Ornelas is a 82 y.o. male with admitting DX of TAMMI (acute kidney injury)    RD reviewed pt's PO intake for length of stay.  Per ADL flow sheet, PO has been consistently <25% for all meals since 4/28.  RD visited pt at bedside. Pt was sitting up in bedside chair.  Pt reported a very low appetite, denied any nausea or abdominal pain.  Discussed meal preferences and oral nutrition supplements with pt. Pt agreeable to only adding pudding at this time. RD will adjust daily menu.  Wt loss of 8% in one week, though per I/O flow sheet, pt is currently -5.7 L of fluid    Assessment:  Height: 182.9 cm (6')  Weight: 88.9 kg (195 lb 15.8 oz)  Body mass index is 26.58 kg/m²., BMI classification: overweight  Diet/Intake: L7/L0 (easy to chew with thin liquids), 2 gram sodium, 1500 mL fluid restriction    Evaluation:   PO has been low (<25% consistently since 4/28)    Malnutrition Risk: No new risk identified.    Recommendations/Plan:  Diet per MD.  Encourage intake of meals and snacks.  Document intake of all meals and snacks as % taken in ADLs to provide interdisciplinary communication across all shifts.   Monitor weight.  Nutrition rep will continue to see patient for ongoing meal and snack preferences.     RD will continue to follow per dept guidelines.

## 2023-05-02 NOTE — THERAPY
Occupational Therapy  Daily Treatment     Patient Name: Taran Ornelas  Age:  82 y.o., Sex:  male  Medical Record #: [id number]  Today's Date: 5/2/2023     Precautions  Precautions: Fall Risk, Weight Bearing As Tolerated Left Lower Extremity, Immobilizer Left Lower Extremity, Posterior Hip Precautions    Assessment     Pt currently limited by decreased functional mobility, activity tolerance, balance, and pain which are affecting pt's ability to complete ADLs/IADLs at baseline. Pt would benefit from OT services in the acute care setting to maximize functional recovery.      Plan    Treatment Plan Status: (P) Continue Current Treatment Plan  Type of Treatment: Self Care / Activities of Daily Living, Therapeutic Activity  Treatment Frequency: 3 Times per Week  Treatment Duration: Until Therapy Goals Met       Discharge Recommendations: (P) Recommend post-acute placement for additional occupational therapy services prior to discharge home       05/02/23 0952   Activities of Daily Living   Grooming Supervision   Upper Body Dressing Supervision   Lower Body Dressing Moderate Assist   Toileting Moderate Assist   Functional Mobility   Sit to Stand Contact Guard Assist   Bed, Chair, Wheelchair Transfer Minimal Assist   Short Term Goals   Short Term Goal # 1 supervised with UB dressing   Goal Outcome # 1 Goal met   Short Term Goal # 2 supervised with LB dressing   Goal Outcome # 2 Progressing as expected   Short Term Goal # 3 supervised with ADL txfs   Goal Outcome # 3 Progressing as expected   Occupational Therapy Treatment Plan    O.T. Treatment Plan Continue Current Treatment Plan   Anticipated Discharge Equipment and Recommendations   Discharge Recommendations Recommend post-acute placement for additional occupational therapy services prior to discharge home

## 2023-05-02 NOTE — DISCHARGE PLANNING
Case Management Discharge Planning    Admission Date: 4/24/2023  GMLOS: 3.7  ALOS: 7    6-Clicks ADL Score: 13  6-Clicks Mobility Score: 15  PT and/or OT Eval ordered: Yes  Pending OT evaluation  Post-acute Referrals Ordered: Yes  Post-acute Choice Obtained: Yes  Has referral(s) been sent to post-acute provider:  Yes    Pending OT re-eval.    Anticipated Discharge Dispo: Discharge Disposition: D/T to SNF with Medicare cert in anticipation of skilled care (03)  Discharge Contact Phone Number: [phone]    DME Needed: No    Action(s) Taken: OTHER  When OT note is in I will contact [facility], [facility], [facility].    Escalations Completed:  RN ALBAN notified PT/OT yesterday that updated notes are needed for SNFs.    Medically Clear: Yes    Next Steps:  When OT note is in I will contact [facility], [facility], [facility].    Barriers to Discharge: Pending OT evaluation    DC today vs. Tomorrow      Natasha EPPS RN Case Manager  [phone]

## 2023-05-03 NOTE — DOCUMENTATION QUERY
"                                                                         [facility]                                                                       Query Response Note      PATIENT:               SHALOM EGAN  ACCT #:                  [id number]  MRN:                     [id number]  :                      1940  ADMIT DATE:       2023 3:09 PM  DISCH DATE:          RESPONDING  PROVIDER #:        [id number]           QUERY TEXT:    Pulmonary Edema is documented in the Medical record.  Can the specific type of pulmonary edema be further specified.    NOTE:  If an appropriate response is not listed below, please respond with a new note    Note: If you agree with a diagnosis listed above, please remember to include it in your concurrent daily documentation and onto the Discharge Summary.      The patient's Clinical Indicators include:  82 year old male admitted after a ground-level fall with left hip arthroplasty dislocation.       [place] \"Cxr today reviewed my reading increasing pulmonary congestion/edema, \"      [place] \"patient having more sob and restless, I have ordered cxr and one time dose of iv lasix. Previous cxr showed cephalization and congestion. Cxr today reviewed my reading increasing pulmonary congestion/edema, echo showed EF 55%, RVSP 70-75 likely pulmonary htn.   \"Patient had old echo as outpatient showed normal ef 55-60 on \"     BNP 9967   BNP 43187     ECHO \"Left ventricular systolic function is normal.The left ventricular ejection fraction is visually estimated to be 55%.  Diastolic function is difficult to assess with arrhythmia.\"    Risk factors: pHTN,   Treatment: labs, CXR, IV Lasix, Tele    If you have any questions, please contact:  Mihaela Currie RN CDI [facility]  [email or web address]  Mihaela Currie [address]  Options provided:   -- Acute pulmonary edema - cardiac related, (please specify type and acuity of CHF, if applicable, or other " cardiac condition)   -- Acute pulmonary edema- non cardiogenic   -- Other explanation, please specify   -- Unable to determine      Query created by: Mihaela Currie on 4/28/2023 2:11 PM    RESPONSE TEXT:    Acute pulmonary edema- non cardiogenic          Electronically signed by:  ITALO PORTILLO MD 5/2/2023 6:35 PM

## 2023-05-03 NOTE — DISCHARGE PLANNING
Case Management Discharge Planning    Admission Date: 4/24/2023  GMLOS: 4.8  ALOS: 8    6-Clicks ADL Score: 18  6-Clicks Mobility Score: 15  Accepted by [facility], [facility] and [facility]. Updated therapy notes 5/2/23.    Anticipated Discharge Dispo: Discharge Disposition: D/T to SNF with Medicare cert in anticipation of skilled care (03)  Discharge Contact Phone Number: [phone]    DME Needed: No    Action(s) Taken: OTHER Multidisciplinary rounds. DC pending labs showing improved renal function.    Escalations Completed: None    Medically Clear: No    Next Steps: Case Management will continue to follow    Barriers to Discharge: Medical clearance    Is the patient up for discharge tomorrow: No      Natasha EPPS RN Case Manager  [phone]

## 2023-05-03 NOTE — PROGRESS NOTES
Hospital Medicine Daily Progress Note    Date of Service  5/3/2023    Chief Complaint  Taran Ornelas is a 82 y.o. male admitted 4/24/2023 with ground level fall    Hospital Course  Mr. Taran Ornelas is a 82 y.o. male with a past medical history significant for CAD, MI, HTN, gout, left hip and knee replacement. who presented on 4/204/2023 after ground-level fall at home onto his left hip with inability to move his left hip.      Patient reports that he was walking in his house when his left knee gave out having him fall on his left hip.  Patient denies any syncopal episodes, or dizziness prior to falling.  Patient does report that he was having some orthostatic episodes but he would wait for them to pass prior to walking which has been going on for few weeks.  Patient reports that he has a history of multiple falls due to his left knee not holding his weight.  He reports that when he fell this time he landed on his left hip with inability to move the left leg or get up.  Denies any head trauma, or trauma to any other parts of the body.  Did not lose consciousness.    In ER, patient noted to have normal vital signs.  Notable lab findings include RBC at 3.10, hemoglobin 9.9, hematocrit 31.1, glucose 115, BUN 54, creatinine 2.82, GFR 21, calcium 8.3, INR 1.26, PT 15.6.  X-ray left femur notes no acute displaced fracture of the left femur but there is a left superior hip arthroplasty dislocation noted.  Chest x-ray notes left basilar opacity likely representative of consolidation or contusion with probable small effusion versus pleural thickening with moderate enlargement of the cardiomediastinal silhouette.  Patient admitted with hospital medicine for management of TAMMI, postacute evaluation with PT, and disposition planning.    Interval Problem Update  -Patient seen and examined.  Noted severe bruising on left leg.  Patient reports left hip pain.  Discussed with patient plan of care including continuation of hydration  due to TAMMI.  Continuation PT evaluation, more likely patient will go to a skilled facility for continued therapy  -Plan of care: Continue pain management for left hip pain; continuation IV fluids due to TAMMI; will likely need placement postacute for continued therapy  -Disposition: Patient switched to inpatient status as he is anticipated to stay 2-3 midnights for management of TAMMI, evaluation for placement for continued physical therapy due to left hip dislocation  -Lab work: Reviewed; expected  -VSS at this time        4/26 In bed, family at bedside, no fever or chills on 2L of o2, normally he does not wear any o2, denies chest pain or sob no palpitation, not in distress, cxr reviewed cephalization, I have ordered ekg, will also order echo, bnp, I have discussed with patient patient's family , all questions answered. Patient having falls as outpatient bp meds recently adjusted.   4/27 In bed, he was sleeping I woke him up, when I pressed on his abdomen he c/o pain, ct abdomen done last night reviewed no acute finding, cholelithiasis but no signs of inflammation, lipase is 111 will add antiacid meds, lft's ordered and reviewed they are wnl. Discussed with patient's family, all questions answered.  Addendum: patient having more sob and restless, I have ordered cxr and one time dose of iv lasix. Previous cxr showed cephalization and congestion.   Cxr today reviewed my reading increasing pulmonary congestion/edema, echo showed EF 55%, RVSP 70-75 likely pulmonary htn. Will transfer to telemetry. I have seen and examined pt, rales on lung exam, no wheezing, lower ext edema 1+. Decreased b/l air entry, will add IS. Lasix received will f/u urine output will get bladder scan to make sure he is voiding.   4/28/2023:  Patient does have significant component of pulmonary hypertension patient does have RVSP of approximately 70.  Patient does have elevated brain natruretic peptide approximate 27,000.  Have readministered patient  with Lasix today 80 mg one-time challenge dose we will place Lasix on starting 4/29/2020 2340 mg IV twice daily.  Continue supportive measures.  Patient will require placement.  Discussion was had with the patient and family at bedside patient's 2 daughters.  All questions were answered in detail.    4/29/2023:  Patient still intermittently attempted to get out of bed without assistance.  Applied Seroquel to patient today patient had improvement after Seroquel.  Furthermore patient continued to have good urinary output with diuresis.  Patient almost titrated off supplemental oxygen.  Discussed findings and details with patient to daughter at bedside.  Continue present medical management anticipate discharge 1 or 2 days.  4/30/2023:  Patient continued to have slow improvement has approximately 5.3 L of fluid out over the past 48 hours.  Continue present medical management patient is pending transition to skilled nursing facility on 5/1/2023.  Long discussion was had again with patient's family at bedside in detail with regards to the course of care.  Patient only requiring approximately 2 L of oxygen at present.  Continue to wean as tolerated.  5/2: Patient seen and examined, afebrile, resting in bed, cont on torsemide for diuresis and wean off O2 as tolerated  Needs placement and CM helping on arrangements   5/3: Patient resting in bed, BP not well controlled,  starting on amlodipine.  Creatinine 1.70 today so will hold metolazone  Hypokalemia: Potassium 3.2 today will replete and monitor   I have discussed this patient's plan of care and discharge plan at IDT rounds today with Case Management, Nursing, Nursing leadership, and other members of the IDT team.    Consultants/Specialty  NONE    Code Status  Full Code    Disposition  Patient is not medically cleared for discharge.   Anticipate discharge to to skilled nursing facility.  I have placed the appropriate orders for post-discharge needs.    Review of  Systems  Review of Systems   Constitutional:  Positive for malaise/fatigue. Negative for chills and fever.   HENT: Negative.     Eyes: Negative.    Respiratory: Negative.     Cardiovascular: Negative.    Gastrointestinal:  Positive for abdominal pain.   Genitourinary: Negative.    Musculoskeletal:  Positive for falls and joint pain. Negative for back pain and myalgias.   Skin: Negative.    Neurological:  Positive for weakness.   Endo/Heme/Allergies: Negative.    Psychiatric/Behavioral: Negative.        Physical Exam  Temp:  [36.4 °C (97.6 °F)-36.9 °C (98.4 °F)] 36.7 °C (98.1 °F)  Pulse:  [66-85] 85  Resp:  [15-16] 15  BP: (119-174)/(57-71) 174/62  SpO2:  [90 %-97 %] 96 %    Physical Exam  Vitals and nursing note reviewed.   Constitutional:       Appearance: He is obese. He is ill-appearing.   HENT:      Head: Normocephalic.      Nose: Nose normal.   Eyes:      General: No scleral icterus.        Right eye: No discharge.         Left eye: No discharge.   Cardiovascular:      Rate and Rhythm: Normal rate and regular rhythm.      Pulses: Normal pulses.      Heart sounds: Normal heart sounds.   Pulmonary:      Effort: Pulmonary effort is normal.      Breath sounds: Normal breath sounds.   Abdominal:      General: Bowel sounds are normal. There is no distension.      Palpations: Abdomen is soft.      Tenderness: There is abdominal tenderness. There is no guarding or rebound.   Musculoskeletal:         General: Tenderness present.      Cervical back: Normal range of motion and neck supple.   Skin:     General: Skin is dry.      Capillary Refill: Capillary refill takes 2 to 3 seconds.   Neurological:      Mental Status: He is alert. Mental status is at baseline.      Motor: Weakness present.      Gait: Gait abnormal.   Psychiatric:         Mood and Affect: Mood normal.         Behavior: Behavior normal.       Fluids    Intake/Output Summary (Last 24 hours) at 5/3/2023 1333  Last data filed at 5/3/2023 0800  Gross per 24  hour   Intake 240 ml   Output 500 ml   Net -260 ml       Laboratory  Recent Labs     05/03/23  0523   WBC 8.4   RBC 3.70*   HEMOGLOBIN 12.1*   HEMATOCRIT 34.6*   MCV 93.5   MCH 32.7   MCHC 35.0   RDW 45.0   PLATELETCT 168   MPV 9.8     Recent Labs     05/03/23  0523   SODIUM 139   POTASSIUM 3.2*   CHLORIDE 96   CO2 29   GLUCOSE 112*   BUN 44*   CREATININE 1.70*   CALCIUM 9.4                     Imaging  DX-CHEST-LIMITED (1 VIEW)   Final Result         Stable chest x-ray findings.      EC-ECHOCARDIOGRAM COMPLETE W/O CONT   Final Result      CT-ABDOMEN-PELVIS W/O   Final Result         1.  Small layering bilateral pleural effusions, right greater than left.   2.  Hazy linear densities in bilateral lung bases suggesting atelectasis, component of infiltrate not excluded   3.  Cholelithiasis   4.  Bladder stones   5.  Atherosclerosis and atherosclerotic coronary artery disease      DX-PELVIS-1 OR 2 VIEWS   Final Result      Successful reduction of dislocated left hip arthroplasty.      DX-PELVIS-1 OR 2 VIEWS   Final Result      1.  There is a superior left hip arthroplasty dislocation.      DX-CHEST-PORTABLE (1 VIEW)   Final Result      1.  Left basilar opacity which may represent consolidation or contusion with probable small effusion versus pleural thickening.   2.  Moderate enlargement of the cardiomediastinal silhouette.      DX-FEMUR-2+ LEFT   Final Result      1.  No acute displaced fracture of the left femur.   2.  There is a left superior hip arthroplasty dislocation.             Assessment/Plan  * TAMMI (acute kidney injury) (HCC)- (present on admission)  Assessment & Plan  -Resolved  Continue to monitor renal function.  Continue to avoid nephrotoxic agents.  Patient with aggressive diuresis however creatinine still maintained with normal limits.    Hip dislocation, left (HCC)  Assessment & Plan  Noted on XRAY     1.  No acute displaced fracture of the left femur.  2.  There is a left superior hip arthroplasty  dislocation.    -Continue PT/OT  snf recommended.     Ground-level fall  Assessment & Plan  Patient brought to ED for trauma secondary to ground-level fall with left hip arthroplasty dislocation.  Patient reports mechanical fall with left knee which is also prosthetic giving out on him.  He had this happen once before approximately 4 to 5 years ago with reduction of the same hip.  -Patient underwent successful left hip reduction in ED.  Improved pain and movement following reduction.  -Fall precautions  -PT eval  -Recommend outpatient orthopedic follow-up    Monitoring.   Pt/ot recommending snf.  -Patient pending transition to skilled nursing facility [facility].  Patient was had a hypotension seemingly resolving.      Orthostatic dizziness  Assessment & Plan  Patient reports a recent history of orthostatic dizziness upon standing.  Was told by his PCP to discontinue his Benzapril, as well as instructed to stand and wait 20 to 30 seconds prior to walking.  -This fall not related to orthostatic, patient reports left knee giving out while walking  -Patient's BP improved after 1 L bolus in ED  -Patient currently on tamsulosin 0.8 for BPH  -PT eval, Recommend F/U with PCP, Consider stopping Tamsulosin.     Improved  continue monitoring  Stopped fluids.   Monitoring.  ekg my reading SR, no ischemic changes no st segment changes.     Edema- (present on admission)  Assessment & Plan  Lower ext  Patient had old echo as outpatient showed normal ef 55-60 on 2020  Will check bnp  cxr my reading increased cephalization, if bnp elevated will get echo.  Echo pending.     Paroxysmal atrial fibrillation (HCC)- (present on admission)  Assessment & Plan  On metoprolol   Not on any a/c.   Due to h/o fall patient probably is high risk for a/c.   EKG SR.  Medically managed on metoprolol    Benign essential hypertension- (present on admission)  Assessment & Plan  After aggressive diuresis patient's blood pressures in the 120s over 70s much  improved.  Continue with Coreg 6.25 mg twice daily.  Continue to hold on benazepril at present.  I have discontinued IV diuretic and placed patient on steady dose of torsemide       Please note that this dictation was created using voice recognition software. I have made every reasonable attempt to correct obvious errors, but I expect that there are errors of grammar and possibly context that I did not discover before finalizing the note.    Greater than 51 minutes spent prepping to see patient (e.g. review of tests) obtaining and/or reviewing separately obtained history. Performing a medically appropriate examination and/ evaluation.  Counseling and educating the patient/family/caregiver.  Ordering medications, tests, or procedures.  Referring and communicating with other health care professionals.  Documenting clinical information in EPIC.  Independently interpreting results and communicating results to patient/family/caregiver.  Care coordination.    VTE prophylaxis: heparin ppx    I have performed a physical exam and reviewed and updated ROS and Plan today (5/3/2023). In review of yesterday's note (5/2/2023), there are no changes except as documented above.

## 2023-05-03 NOTE — CARE PLAN
The patient is Stable - Low risk of patient condition declining or worsening    Shift Goals  Clinical Goals: Fall prevention, safety, rest  Patient Goals: Rest, comfort  Family Goals: LEVAR    Progress made toward(s) clinical / shift goals:  Patient has tele sitter in place for safety.    Problem: Knowledge Deficit - Standard  Goal: Patient and family/care givers will demonstrate understanding of plan of care, disease process/condition, diagnostic tests and medications  Outcome: Progressing, patient demonstrates understanding of care plan. Needs occasional reinforcement.     Problem: Fall Risk  Goal: Patient will remain free from falls  Outcome: Progressing, patient remains free from falls. Tele sitter in room and bed alarm on for fall precautions.     Problem: Pain - Standard  Goal: Alleviation of pain or a reduction in pain to the patient’s comfort goal  Outcome: Progressing, patient notes reduction in pain with medication, rest, and repositioning.      Problem: Skin Integrity  Goal: Skin integrity is maintained or improved  Outcome: Progressing, skin integrity maintained with frequent mobilization/turning.        Patient is not progressing towards the following goals:

## 2023-05-04 NOTE — CARE PLAN
Problem: Knowledge Deficit - Standard  Goal: Patient and family/care givers will demonstrate understanding of plan of care, disease process/condition, diagnostic tests and medications  Outcome: Progressing     Problem: Fall Risk  Goal: Patient will remain free from falls  Outcome: Progressing     Problem: Skin Integrity  Goal: Skin integrity is maintained or improved  Outcome: Progressing   The patient is Stable - Low risk of patient condition declining or worsening    Shift Goals  Clinical Goals: Safety, pain control  Patient Goals: rest, comfort  Family Goals: LEVAR

## 2023-05-04 NOTE — THERAPY
Physical Therapy Contact Note    Patient Name: Taran Ornelas  Age:  82 y.o., Sex:  male  Medical Record #: [id number]  Today's Date: 5/4/2023 05/04/23 0857   Interdisciplinary Plan of Care Collaboration   Collaboration Comments Attempted to initiate PT tx this date. Pt up in chair, and noted significant confusion. Pt without knee immobilizer, and currently unable to locate in the room. Discussed w/RN regarding need for KI for 2 weeks post hip reduction, and importance of wearing given cognitive deficits/poor compliance with posterior hip precautions. RN to order additional KI. Will defer at this time, and f/u as able.

## 2023-05-04 NOTE — PROGRESS NOTES
Handoff report received from day shift nurse. Pt care assumed. Pt is currently resting in bed. POC discussed with Pt and Pt verbalizes no questions at this time. Pt is AAOx4, on Ra, and VSS. Call light and belongings within reach, bed in lowest and locked position, and Pt educated on use of call light. Tele sitter in room.

## 2023-05-04 NOTE — THERAPY
"Speech Language Pathology   Clinical Swallow Evaluation     Patient Name: Taran Ornelas  AGE:  82 y.o., SEX:  male  Medical Record #: [id number]  Date of Service: 2023      History of Present Illness  82 y.o. man admitted on 23 for GLF, found to have TAMMI, L hip dislocation. PMH notable for CAD, MI, HTN, gout, L hip and knee replacement.     Imagin23 CXR:   1.  Left basilar opacity which may represent consolidation or contusion with probable small effusion versus pleural thickening.  2.  Moderate enlargement of the cardiomediastinal silhouette.       General Information:  Vitals  O2 Delivery Device: None - Room Air  Level of Consciousness: Alert  Patient Behaviors: Restless, Confused  Orientation: Self  Follows Directives: Inconsistent      Prior Living Situation & Level of Function:  Lives with - Patient's Self Care Capacity: Adult Children  Comments: pt lives with his Dtr and SANDRA who are retired and assist with IADLs  Communication: WFL  Swallowing: Not previously seen by service per EMR.       Oral Mechanism Evaluation:  Dentition: Fair, Natural dentition, Missing posterior dentition (missing upper dentition)   Facial Symmetry: Equal  Facial Sensation: Pt did not follow commands to assess     Labial Observations: Pt did not follow commands to assess   Lingual Observations: Pt did not follow commands to assess, Xerostomia  Motor Speech: slurred          Laryngeal Function:  Secretion Management: Adequate  Voice Quality: Harsh, Strain (moderate)  Cough: Perceptually WNL       Subjective  RN reports patient coughing with tiny pill this AM. Patient not participatory in interview (\"I'm not answering these questions\"). Patient did not provide baseline diet information.      Assessment  Current Method of Nutrition: NPO until cleared by speech pathology  Positioning: Sitting [facility]  Bolus Administration: SLP    O2 Delivery Device: None - Room Air  Factor(s) Affecting Performance: Impaired command following, " Impaired mental status  Tracheostomy : No      Swallowing Trials:  Thin Liquid (TN0): Impaired  Liquidised (LQ3): WFL  Soft & Bite Sized (SB6): Impaired (oral phase)      Comments: Adequate oral bolus acceptance/containment, complete AP transfer, and prolonged/functional mastication. Single instance cough with initial straw sip, not replicated across trials. Vocal quality remained stable throughout assessment. Single swallow completed per bolus. No signs of esophageal dysfunction. Patient not self-feeding when cued, provided feeding assistance. Provided education regarding general aspiration precautions as well as signs of aspiration. All questions addressed.        Clinical Impressions  Patient presents with single instance clinical indicator of airway invasion with initial straw sip. No signs of pharyngeal inefficiency nor esophageal dysfunction. Moderate dysphonia with rough/strained vocal quality (unsure if baseline). Presentation appears consistent with current mentation. Initiation of modified diet appears appropriate at this time. Service will continue to follow to maximize swallowing outcomes.      Recommendations  Diet Consistency: Soft and bite size solids with thin liquids  Instrumentation: Instrumental swallow study pending clinical progress  Medication: As tolerated  Supervision: Assist with meal tray set up, Close supervision - patient may be left alone for less than 5 minutes at a time  Positioning: Fully upright and midline during oral intake  Risk Management : Small bites/sips, Slow rate of intake  Oral Care: Q6h         SLP Treatment Plan  Treatment Plan: Dysphagia Treatment  SLP Frequency: 2x Per Week  Estimated Duration: Until Therapy Goals Met      Anticipated Discharge Needs  Discharge Recommendations: Anticipate that the patient will have no further speech therapy needs after discharge from the hospital   Therapy Recommendations Upon DC: Not Indicated        Patient / Family Goals  Patient /  "Family Goal #1: \"I'm not hungry\"  Short Term Goals  Short Term Goal # 1: Patient will consume a diet of SB6/TN0 without decline in pulmonary status.      Lalita Becerra, SLP   "

## 2023-05-04 NOTE — PROGRESS NOTES
Bedside report received from night RN; assumed pt care. Pt assessment complete. Pt A&Ox4 Reviewed plan of care with pt. Pt tele monitored. Chart and labs reviewed. Bed in lowest position, and 2 side rails up. Pt educated on call light; call light with in reach.

## 2023-05-04 NOTE — CARE PLAN
The patient is Stable - Low risk of patient condition declining or worsening    Shift Goals  Clinical Goals: Safety, pain control  Patient Goals: rest, comfort  Family Goals: LEVAR    Progress made toward(s) clinical / shift goals:  Patient safety maintained. Pain managed with rest, repositioning, and medication.      Problem: Knowledge Deficit - Standard  Goal: Patient and family/care givers will demonstrate understanding of plan of care, disease process/condition, diagnostic tests and medications  Outcome: Progressing, patient demonstrates understanding of care plan. Reinforcement needed occasionally.     Problem: Fall Risk  Goal: Patient will remain free from falls  Outcome: Progressing, patient remains free from falls.     Problem: Pain - Standard  Goal: Alleviation of pain or a reduction in pain to the patient’s comfort goal  Outcome: Progressing, patient notes reduction in pain with rest, repositioning, and medication     Problem: Skin Integrity  Goal: Skin integrity is maintained or improved  Outcome: Progressing, skin integrity maintained. Patient turns self from side to side, pillows in place for support.        Patient is not progressing towards the following goals:

## 2023-05-04 NOTE — THERAPY
Physical Therapy Contact Note    Patient Name: Taran Ornelas  Age:  82 y.o., Sex:  male  Medical Record #: [id number]  Today's Date: 5/4/2023 05/04/23 1358   Interdisciplinary Plan of Care Collaboration   Collaboration Comments Knee immobilizer still not present at bedside for pt's established precautions. Discussed with RN, and will continue to follow.

## 2023-05-04 NOTE — PROGRESS NOTES
Hospital Medicine Daily Progress Note    Date of Service  5/4/2023    Chief Complaint  Taran Ornelas is a 82 y.o. male admitted 4/24/2023 with ground level fall    Hospital Course  Mr. Taran Ornelas is a 82 y.o. male with a past medical history significant for CAD, MI, HTN, gout, left hip and knee replacement. who presented on 4/204/2023 after ground-level fall at home onto his left hip with inability to move his left hip.      Patient reports that he was walking in his house when his left knee gave out having him fall on his left hip.  Patient denies any syncopal episodes, or dizziness prior to falling.  Patient does report that he was having some orthostatic episodes but he would wait for them to pass prior to walking which has been going on for few weeks.  Patient reports that he has a history of multiple falls due to his left knee not holding his weight.  He reports that when he fell this time he landed on his left hip with inability to move the left leg or get up.  Denies any head trauma, or trauma to any other parts of the body.  Did not lose consciousness.    In ER, patient noted to have normal vital signs.  Notable lab findings include RBC at 3.10, hemoglobin 9.9, hematocrit 31.1, glucose 115, BUN 54, creatinine 2.82, GFR 21, calcium 8.3, INR 1.26, PT 15.6.  X-ray left femur notes no acute displaced fracture of the left femur but there is a left superior hip arthroplasty dislocation noted.  Chest x-ray notes left basilar opacity likely representative of consolidation or contusion with probable small effusion versus pleural thickening with moderate enlargement of the cardiomediastinal silhouette.  Patient admitted with hospital medicine for management of TAMMI, postacute evaluation with PT, and disposition planning.    Interval Problem Update  -Patient seen and examined.  Noted severe bruising on left leg.  Patient reports left hip pain.  Discussed with patient plan of care including continuation of hydration  due to TAMMI.  Continuation PT evaluation, more likely patient will go to a skilled facility for continued therapy  -Plan of care: Continue pain management for left hip pain; continuation IV fluids due to TAMMI; will likely need placement postacute for continued therapy  -Disposition: Patient switched to inpatient status as he is anticipated to stay 2-3 midnights for management of TAMMI, evaluation for placement for continued physical therapy due to left hip dislocation  -Lab work: Reviewed; expected  -VSS at this time        4/26 In bed, family at bedside, no fever or chills on 2L of o2, normally he does not wear any o2, denies chest pain or sob no palpitation, not in distress, cxr reviewed cephalization, I have ordered ekg, will also order echo, bnp, I have discussed with patient patient's family , all questions answered. Patient having falls as outpatient bp meds recently adjusted.   4/27 In bed, he was sleeping I woke him up, when I pressed on his abdomen he c/o pain, ct abdomen done last night reviewed no acute finding, cholelithiasis but no signs of inflammation, lipase is 111 will add antiacid meds, lft's ordered and reviewed they are wnl. Discussed with patient's family, all questions answered.  Addendum: patient having more sob and restless, I have ordered cxr and one time dose of iv lasix. Previous cxr showed cephalization and congestion.   Cxr today reviewed my reading increasing pulmonary congestion/edema, echo showed EF 55%, RVSP 70-75 likely pulmonary htn. Will transfer to telemetry. I have seen and examined pt, rales on lung exam, no wheezing, lower ext edema 1+. Decreased b/l air entry, will add IS. Lasix received will f/u urine output will get bladder scan to make sure he is voiding.   4/28/2023:  Patient does have significant component of pulmonary hypertension patient does have RVSP of approximately 70.  Patient does have elevated brain natruretic peptide approximate 27,000.  Have readministered patient  with Lasix today 80 mg one-time challenge dose we will place Lasix on starting 4/29/2020 2340 mg IV twice daily.  Continue supportive measures.  Patient will require placement.  Discussion was had with the patient and family at bedside patient's 2 daughters.  All questions were answered in detail.    4/29/2023:  Patient still intermittently attempted to get out of bed without assistance.  Applied Seroquel to patient today patient had improvement after Seroquel.  Furthermore patient continued to have good urinary output with diuresis.  Patient almost titrated off supplemental oxygen.  Discussed findings and details with patient to daughter at bedside.  Continue present medical management anticipate discharge 1 or 2 days.  4/30/2023:  Patient continued to have slow improvement has approximately 5.3 L of fluid out over the past 48 hours.  Continue present medical management patient is pending transition to skilled nursing facility on 5/1/2023.  Long discussion was had again with patient's family at bedside in detail with regards to the course of care.  Patient only requiring approximately 2 L of oxygen at present.  Continue to wean as tolerated.  5/2: Patient seen and examined, afebrile, resting in bed, cont on torsemide for diuresis and wean off O2 as tolerated  Needs placement and CM helping on arrangements   5/3: Patient resting in bed, BP not well controlled,  starting on amlodipine.  Creatinine 1.70 today so will hold metolazone  Hypokalemia: Potassium 3.2 today will replete and monitor   5/4: Patient seen and examined resting in bed, afebrile, BMP reviewed creatine 1.69 today cont to hold metolazone. Hypokalemia has improved.  Will need placement CM helping on arrangements   I have discussed this patient's plan of care and discharge plan at IDT rounds today with Case Management, Nursing, Nursing leadership, and other members of the IDT team.    Consultants/Specialty  NONE    Code Status  Full  Code    Disposition  Patient is not medically cleared for discharge.   Anticipate discharge to to skilled nursing facility.  I have placed the appropriate orders for post-discharge needs.    Review of Systems  Review of Systems   Constitutional:  Positive for malaise/fatigue. Negative for chills and fever.   HENT: Negative.     Eyes: Negative.    Respiratory: Negative.     Cardiovascular: Negative.    Gastrointestinal:  Positive for abdominal pain.   Genitourinary: Negative.    Musculoskeletal:  Positive for falls and joint pain. Negative for back pain and myalgias.   Skin: Negative.    Neurological:  Positive for weakness.   Endo/Heme/Allergies: Negative.    Psychiatric/Behavioral: Negative.        Physical Exam  Temp:  [36.7 °C (98.1 °F)-37.2 °C (99 °F)] 36.7 °C (98.1 °F)  Pulse:  [69-81] 78  Resp:  [16-19] 19  BP: ()/(48-64) 126/55  SpO2:  [91 %-94 %] 94 %    Physical Exam  Vitals and nursing note reviewed.   Constitutional:       Appearance: He is obese. He is ill-appearing.   HENT:      Head: Normocephalic.      Nose: Nose normal.   Eyes:      General: No scleral icterus.        Right eye: No discharge.         Left eye: No discharge.   Cardiovascular:      Rate and Rhythm: Normal rate and regular rhythm.      Pulses: Normal pulses.      Heart sounds: Normal heart sounds.   Pulmonary:      Effort: Pulmonary effort is normal.      Breath sounds: Normal breath sounds.   Abdominal:      General: Bowel sounds are normal. There is no distension.      Palpations: Abdomen is soft.      Tenderness: There is abdominal tenderness. There is no guarding or rebound.   Musculoskeletal:         General: Tenderness present.      Cervical back: Normal range of motion and neck supple.   Skin:     General: Skin is dry.      Capillary Refill: Capillary refill takes 2 to 3 seconds.   Neurological:      Mental Status: He is alert. Mental status is at baseline.      Motor: Weakness present.      Gait: Gait abnormal.    Psychiatric:         Mood and Affect: Mood normal.         Behavior: Behavior normal.       Fluids    Intake/Output Summary (Last 24 hours) at 5/4/2023 1430  Last data filed at 5/3/2023 2025  Gross per 24 hour   Intake --   Output 50 ml   Net -50 ml       Laboratory  Recent Labs     05/03/23  0523 05/04/23  1037   WBC 8.4 11.1*   RBC 3.70* 3.79*   HEMOGLOBIN 12.1* 12.2*   HEMATOCRIT 34.6* 37.3*   MCV 93.5 98.4*   MCH 32.7 32.2   MCHC 35.0 32.7*   RDW 45.0 48.1   PLATELETCT 168 220   MPV 9.8 9.4     Recent Labs     05/03/23 0523 05/04/23  0925   SODIUM 139 143   POTASSIUM 3.2* 4.2   CHLORIDE 96 100   CO2 29 30   GLUCOSE 112* 156*   BUN 44* 45*   CREATININE 1.70* 1.69*   CALCIUM 9.4 9.9                     Imaging  DX-CHEST-LIMITED (1 VIEW)   Final Result         Stable chest x-ray findings.      EC-ECHOCARDIOGRAM COMPLETE W/O CONT   Final Result      CT-ABDOMEN-PELVIS W/O   Final Result         1.  Small layering bilateral pleural effusions, right greater than left.   2.  Hazy linear densities in bilateral lung bases suggesting atelectasis, component of infiltrate not excluded   3.  Cholelithiasis   4.  Bladder stones   5.  Atherosclerosis and atherosclerotic coronary artery disease      DX-PELVIS-1 OR 2 VIEWS   Final Result      Successful reduction of dislocated left hip arthroplasty.      DX-PELVIS-1 OR 2 VIEWS   Final Result      1.  There is a superior left hip arthroplasty dislocation.      DX-CHEST-PORTABLE (1 VIEW)   Final Result      1.  Left basilar opacity which may represent consolidation or contusion with probable small effusion versus pleural thickening.   2.  Moderate enlargement of the cardiomediastinal silhouette.      DX-FEMUR-2+ LEFT   Final Result      1.  No acute displaced fracture of the left femur.   2.  There is a left superior hip arthroplasty dislocation.             Assessment/Plan  * TAMMI (acute kidney injury) (HCC)- (present on admission)  Assessment & Plan  -Resolved  Continue to  monitor renal function.  Continue to avoid nephrotoxic agents.  Patient with aggressive diuresis however creatinine still maintained with normal limits.    Hip dislocation, left (HCC)  Assessment & Plan  Noted on XRAY     1.  No acute displaced fracture of the left femur.  2.  There is a left superior hip arthroplasty dislocation.    -Continue PT/OT  snf recommended.     Ground-level fall  Assessment & Plan  Patient brought to ED for trauma secondary to ground-level fall with left hip arthroplasty dislocation.  Patient reports mechanical fall with left knee which is also prosthetic giving out on him.  He had this happen once before approximately 4 to 5 years ago with reduction of the same hip.  -Patient underwent successful left hip reduction in ED.  Improved pain and movement following reduction.  -Fall precautions  -PT eval  -Recommend outpatient orthopedic follow-up    Monitoring.   Pt/ot recommending snf.  -Patient pending transition to skilled nursing facility [facility].  Patient was had a hypotension seemingly resolving.      Orthostatic dizziness  Assessment & Plan  Patient reports a recent history of orthostatic dizziness upon standing.  Was told by his PCP to discontinue his Benzapril, as well as instructed to stand and wait 20 to 30 seconds prior to walking.  -This fall not related to orthostatic, patient reports left knee giving out while walking  -Patient's BP improved after 1 L bolus in ED  -Patient currently on tamsulosin 0.8 for BPH  -PT eval, Recommend F/U with PCP, Consider stopping Tamsulosin.     Improved  continue monitoring  Stopped fluids.   Monitoring.  ekg my reading SR, no ischemic changes no st segment changes.     Edema- (present on admission)  Assessment & Plan  Lower ext  Patient had old echo as outpatient showed normal ef 55-60 on 2020  Will check bnp  cxr my reading increased cephalization, if bnp elevated will get echo.  Echo pending.     Paroxysmal atrial fibrillation (HCC)- (present  on admission)  Assessment & Plan  On metoprolol   Not on any a/c.   Due to h/o fall patient probably is high risk for a/c.   EKG SR.  Medically managed on metoprolol    Benign essential hypertension- (present on admission)  Assessment & Plan  After aggressive diuresis patient's blood pressures in the 120s over 70s much improved.  Continue with Coreg 6.25 mg twice daily.  Continue to hold on benazepril at present.  I have discontinued IV diuretic and placed patient on steady dose of torsemide       Please note that this dictation was created using voice recognition software. I have made every reasonable attempt to correct obvious errors, but I expect that there are errors of grammar and possibly context that I did not discover before finalizing the note.    Greater than 51 minutes spent prepping to see patient (e.g. review of tests) obtaining and/or reviewing separately obtained history. Performing a medically appropriate examination and/ evaluation.  Counseling and educating the patient/family/caregiver.  Ordering medications, tests, or procedures.  Referring and communicating with other health care professionals.  Documenting clinical information in EPIC.  Independently interpreting results and communicating results to patient/family/caregiver.  Care coordination.    VTE prophylaxis: heparin ppx    I have performed a physical exam and reviewed and updated ROS and Plan today (5/4/2023). In review of yesterday's note (5/3/2023), there are no changes except as documented above.

## 2023-05-04 NOTE — DISCHARGE PLANNING
Case Management Discharge Planning    Admission Date: 4/24/2023  GMLOS: 4.8  ALOS: 9    6-Clicks ADL Score: 18  6-Clicks Mobility Score: 15  PT and/or OT Eval ordered: Yes  Post-acute Referrals Ordered: Yes  Post-acute Choice Obtained: Yes  Has referral(s) been sent to post-acute provider:  Yes      Anticipated Discharge Dispo: Discharge Disposition: D/T to SNF with Medicare cert in anticipation of skilled care (03)  Discharge Contact Phone Number: [phone]    DME Needed: No    Action(s) Taken: Referral(s) sent    Escalations Completed: Insurance  TC to [facility]/ [facility] / [phone]. She will start insurance authorization.    Medically Clear: Yes    Next Steps: Will coordinate transportation.    Barriers to Discharge: Pending Insurance Authorization    Is the patient up for discharge tomorrow: Yes    Is transport arranged for discharge disposition: possible pending authorization    Update: 4:29 pm  Daughter Ashley Stratton [phone] (M) called and left  on co-worker Freya's phone. Attempted to call her back. Voicemail not set up on her phone.        Natasha EPPS, RN Case Manager  [phone]

## 2023-05-04 NOTE — CARE PLAN
Problem: Knowledge Deficit - Standard  Goal: Patient and family/care givers will demonstrate understanding of plan of care, disease process/condition, diagnostic tests and medications  Outcome: Progressing     Problem: Fall Risk  Goal: Patient will remain free from falls  Outcome: Progressing     Problem: Pain - Standard  Goal: Alleviation of pain or a reduction in pain to the patient’s comfort goal  Outcome: Progressing     Problem: Skin Integrity  Goal: Skin integrity is maintained or improved  Outcome: Progressing   The patient is Stable - Low risk of patient condition declining or worsening    Shift Goals  Clinical Goals: bp WNL  Patient Goals: Rest  Family Goals: LEVAR    Progress made toward(s) clinical / shift goals:  MD medically managed BP increased coreg now WNL     Patient is not progressing towards the following goals: urinary frequency

## 2023-05-05 PROBLEM — Z71.89 ADVANCE CARE PLANNING: Status: ACTIVE | Noted: 2023-01-01

## 2023-05-05 PROBLEM — R41.0 DELIRIUM: Status: ACTIVE | Noted: 2023-01-01

## 2023-05-05 NOTE — CARE PLAN
Problem: Knowledge Deficit - Standard  Goal: Patient and family/care givers will demonstrate understanding of plan of care, disease process/condition, diagnostic tests and medications  Outcome: Progressing     Problem: Fall Risk  Goal: Patient will remain free from falls  Outcome: Progressing     Problem: Pain - Standard  Goal: Alleviation of pain or a reduction in pain to the patient’s comfort goal  Outcome: Progressing     Problem: Skin Integrity  Goal: Skin integrity is maintained or improved  Outcome: Progressing     Problem: Provide Safe Environment  Goal: Suicide environmental safety, protocols, policies, and practices will be implemented  Outcome: Progressing     Problem: Psychosocial  Goal: Patient's ability to identify and develop effective coping behaviors will improve  Outcome: Progressing  Goal: Patient's ability to identify and utilize available support systems will improve  Outcome: Progressing     Problem: Psychosocial  Goal: Patient's level of anxiety will decrease  Outcome: Progressing  Goal: Patient's ability to verbalize feelings about condition will improve  Outcome: Progressing  Goal: Patient's ability to re-evaluate and adapt role responsibilities will improve  Outcome: Progressing  Goal: Patient and family will demonstrate ability to cope with life altering diagnosis and/or procedure  Outcome: Progressing  Goal: Spiritual and cultural needs incorporated into hospitalization  Outcome: Progressing     Problem: Communication  Goal: The ability to communicate needs accurately and effectively will improve  Outcome: Progressing     Problem: Discharge Barriers/Planning  Goal: Patient's continuum of care needs are met  Outcome: Progressing     Problem: Hemodynamics  Goal: Patient's hemodynamics, fluid balance and neurologic status will be stable or improve  Outcome: Progressing     Problem: Respiratory  Goal: Patient will achieve/maintain optimum respiratory ventilation and gas exchange  Outcome:  Progressing     Problem: Chest Tube Management  Goal: Complications related to chest tube will be avoided or minimized  Outcome: Progressing     Problem: Fluid Volume  Goal: Fluid volume balance will be maintained  Outcome: Progressing     Problem: Dysphagia  Goal: Dysphagia will improve  Outcome: Progressing     Problem: Risk for Aspiration  Goal: Patient's risk for aspiration will be absent or decrease  Outcome: Progressing     Problem: Nutrition  Goal: Patient's nutritional and fluid intake will be adequate or improve  Outcome: Progressing  Goal: Enteral nutrition will be maintained or improve  Outcome: Progressing  Goal: Enteral nutrition will be maintained or improve  Outcome: Progressing     Problem: Urinary Elimination  Goal: Establish and maintain regular urinary output  Outcome: Progressing     Problem: Bowel Elimination  Goal: Establish and maintain regular bowel function  Outcome: Progressing     Problem: Gastrointestinal Irritability  Goal: Nausea and vomiting will be absent or improve  Outcome: Progressing  Goal: Diarrhea will be absent or improved  Outcome: Progressing     Problem: Rectal Tube  Goal: Fecal output will be contained and skin will remain free from irritation  Outcome: Progressing     Problem: Mobility  Goal: Patient's capacity to carry out activities will improve  Outcome: Progressing     Problem: Self Care  Goal: Patient will have the ability to perform ADLs independently or with assistance (bathe, groom, dress, toilet and feed)  Outcome: Progressing     Problem: Infection - Standard  Goal: Patient will remain free from infection  Outcome: Progressing     Problem: Wound/ / Incision Healing  Goal: Patient's wound/surgical incision will decrease in size and heals properly  Outcome: Progressing   The patient is Stable - Low risk of patient condition declining or worsening    Shift Goals  Clinical Goals: Safety  Patient Goals: Comfort  Family Goals: LEVAR    Progress made toward(s) clinical /  shift goals:        Patient is not progressing towards the following goals:  Pt has a sitter in place. Pt has been very agitated and restless throughout the shift

## 2023-05-05 NOTE — THERAPY
Speech Language Pathology   Daily Treatment     Patient Name: Taran Ornelas  AGE:  82 y.o., SEX:  male  Medical Record #: [id number]  Date of Service: 5/5/2023      Precautions:  Precautions: Fall Risk, Weight Bearing As Tolerated Left Lower Extremity, Posterior Hip Precautions, Swallow Precautions     HPI: 82 y.o. man admitted on 4/24/23 for GLF, found to have TAMMI, L hip dislocation. PMH notable for CAD, MI, HTN, gout, L hip and knee replacement.     Subjective  Patient seen this date for dysphagia management per RN request r/t change in mentation with signs of aspiration. RN reports patient agitated/aggressive most of the night. Patient alert and positioned to upright in bed. Patient grumbling, saying expletives, and insisting he needed to leave hospital.       Assessment  PO presentations of TN0 (cup). Adequate oral bolus acceptance/containment, complete/timely AP transfer. Immediate cough across all trials of TN0. Patient with increased wet vocal quality with gurgling after PO. Single swallow completed per bolus. No signs of esophageal dysfunction.       Clinical Impressions  Patient presents with clinical indicators of airway invasion, concerning for pharyngeal dysphagia. This represents a decline in swallow function since last seen. Suspect r/t decrease in mentation. Recommend NPO with re-evaluation of swallow function tomorrow to determine readiness for re-initiation of oral diet vs swallowing diagnostic (if appropriate and within GOC).       Recommendations  Diet Consistency: NPO with high follow up; Clear for minimal ice chips/hour after oral care to reduce xerostomia and mitigate disuse atrophy of swallow musculature   Instrumentation: Instrumental swallow study pending clinical progress  Medication: Non Oral, Crush with applesauce, as appropriate  Oral Care: Q6h                     SLP Treatment Plan  Treatment Plan: Dysphagia Treatment  SLP Frequency: 3x Per Week  Estimated Duration: Until Therapy Goals  "Met      Anticipated Discharge Needs  Discharge Recommendations: Recommend post-acute placement for additional speech therapy services prior to discharge home  Therapy Recommendations Upon DC: Dysphagia Training      Patient / Family Goals  Patient / Family Goal #1: \"I'm not hungry\"  Goal #1 Outcome: Progressing slower than expected  Short Term Goals  Short Term Goal # 1: Patient will consume a diet of SB6/TN0 without decline in pulmonary status.  Goal Outcome # 1: Progressing slower than expected      BUDDY Keith  "

## 2023-05-05 NOTE — ASSESSMENT & PLAN NOTE
dicussed with patient daughter about patient wishes and as per daughter patient is DNR/DNI  More then 18 min spent discussing regarding goals of care     5/6: discussed again about goals of care and if patient would like aggressive measures as per daughter patient would not wanted aggressive measure. Comfort care measures were also discussed with daughter and family will get together to decide regarding his goals of care more then 21 min spent

## 2023-05-05 NOTE — PROGRESS NOTES
Hospital Medicine Daily Progress Note    Date of Service  5/5/2023    Chief Complaint  Taran Ornelas is a 82 y.o. male admitted 4/24/2023 with ground level fall    Hospital Course  Mr. Taran Ornelas is a 82 y.o. male with a past medical history significant for CAD, MI, HTN, gout, left hip and knee replacement. who presented on 4/204/2023 after ground-level fall at home onto his left hip with inability to move his left hip.      Patient reports that he was walking in his house when his left knee gave out having him fall on his left hip.  Patient denies any syncopal episodes, or dizziness prior to falling.  Patient does report that he was having some orthostatic episodes but he would wait for them to pass prior to walking which has been going on for few weeks.  Patient reports that he has a history of multiple falls due to his left knee not holding his weight.  He reports that when he fell this time he landed on his left hip with inability to move the left leg or get up.  Denies any head trauma, or trauma to any other parts of the body.  Did not lose consciousness.    In ER, patient noted to have normal vital signs.  Notable lab findings include RBC at 3.10, hemoglobin 9.9, hematocrit 31.1, glucose 115, BUN 54, creatinine 2.82, GFR 21, calcium 8.3, INR 1.26, PT 15.6.  X-ray left femur notes no acute displaced fracture of the left femur but there is a left superior hip arthroplasty dislocation noted.  Chest x-ray notes left basilar opacity likely representative of consolidation or contusion with probable small effusion versus pleural thickening with moderate enlargement of the cardiomediastinal silhouette.  Patient admitted with hospital medicine for management of TAMMI, postacute evaluation with PT, and disposition planning.    Interval Problem Update  -Patient seen and examined.  Noted severe bruising on left leg.  Patient reports left hip pain.  Discussed with patient plan of care including continuation of hydration  due to TAMMI.  Continuation PT evaluation, more likely patient will go to a skilled facility for continued therapy  -Plan of care: Continue pain management for left hip pain; continuation IV fluids due to TAMMI; will likely need placement postacute for continued therapy  -Disposition: Patient switched to inpatient status as he is anticipated to stay 2-3 midnights for management of TAMMI, evaluation for placement for continued physical therapy due to left hip dislocation  -Lab work: Reviewed; expected  -VSS at this time        4/26 In bed, family at bedside, no fever or chills on 2L of o2, normally he does not wear any o2, denies chest pain or sob no palpitation, not in distress, cxr reviewed cephalization, I have ordered ekg, will also order echo, bnp, I have discussed with patient patient's family , all questions answered. Patient having falls as outpatient bp meds recently adjusted.   4/27 In bed, he was sleeping I woke him up, when I pressed on his abdomen he c/o pain, ct abdomen done last night reviewed no acute finding, cholelithiasis but no signs of inflammation, lipase is 111 will add antiacid meds, lft's ordered and reviewed they are wnl. Discussed with patient's family, all questions answered.  Addendum: patient having more sob and restless, I have ordered cxr and one time dose of iv lasix. Previous cxr showed cephalization and congestion.   Cxr today reviewed my reading increasing pulmonary congestion/edema, echo showed EF 55%, RVSP 70-75 likely pulmonary htn. Will transfer to telemetry. I have seen and examined pt, rales on lung exam, no wheezing, lower ext edema 1+. Decreased b/l air entry, will add IS. Lasix received will f/u urine output will get bladder scan to make sure he is voiding.   4/28/2023:  Patient does have significant component of pulmonary hypertension patient does have RVSP of approximately 70.  Patient does have elevated brain natruretic peptide approximate 27,000.  Have readministered patient  with Lasix today 80 mg one-time challenge dose we will place Lasix on starting 4/29/2020 2340 mg IV twice daily.  Continue supportive measures.  Patient will require placement.  Discussion was had with the patient and family at bedside patient's 2 daughters.  All questions were answered in detail.    4/29/2023:  Patient still intermittently attempted to get out of bed without assistance.  Applied Seroquel to patient today patient had improvement after Seroquel.  Furthermore patient continued to have good urinary output with diuresis.  Patient almost titrated off supplemental oxygen.  Discussed findings and details with patient to daughter at bedside.  Continue present medical management anticipate discharge 1 or 2 days.  4/30/2023:  Patient continued to have slow improvement has approximately 5.3 L of fluid out over the past 48 hours.  Continue present medical management patient is pending transition to skilled nursing facility on 5/1/2023.  Long discussion was had again with patient's family at bedside in detail with regards to the course of care.  Patient only requiring approximately 2 L of oxygen at present.  Continue to wean as tolerated.  5/2: Patient seen and examined, afebrile, resting in bed, cont on torsemide for diuresis and wean off O2 as tolerated  Needs placement and CM helping on arrangements   5/3: Patient resting in bed, BP not well controlled,  starting on amlodipine.  Creatinine 1.70 today so will hold metolazone  Hypokalemia: Potassium 3.2 today will replete and monitor   5/4: Patient seen and examined resting in bed, afebrile, BMP reviewed creatine 1.69 today cont to hold metolazone. Hypokalemia has improved.  Will need placement CM helping on arrangements   5/5: Patient seen and examined, he has become very agitated overnight and more confused   He was also placed on legal hold as he was trying to hurt himself    Checking a UA, ammonia, starting empirically on on IV ceftriaxone, , seen  also by psychiatry case discussed with Dr. Flaherty and legal hold not valid as he is delirious   Also adding haldol IV 2m g prn as needed   CT head done and I reviewed it ,no acute issues.  Family at bedside and I updated on patient condition and plan of care. Also discussed with patient regarding h advance directives and goals of care and patient has been changed to DNR/DNI   TAMMI slowly improving since his metolazone was stopped   I have discussed this patient's plan of care and discharge plan at IDT rounds today with Case Management, Nursing, Nursing leadership, and other members of the IDT team.    Consultants/Specialty  NONE    Code Status  DNAR/DNI    Disposition  Patient is not medically cleared for discharge.   Anticipate discharge to to skilled nursing facility.  I have placed the appropriate orders for post-discharge needs.    Review of Systems  Review of Systems   Unable to perform ROS: Mental acuity   Endo/Heme/Allergies: Negative.       Physical Exam  Temp:  [35.8 °C (96.5 °F)-37.2 °C (99 °F)] 35.8 °C (96.5 °F)  Pulse:  [65-93] 93  Resp:  [16-20] 18  BP: (111-143)/(51-58) 143/58  SpO2:  [92 %-95 %] 92 %    Physical Exam  Vitals and nursing note reviewed.   Constitutional:       Appearance: He is obese. He is ill-appearing.   HENT:      Head: Normocephalic.      Nose: Nose normal.   Eyes:      General: No scleral icterus.        Right eye: No discharge.         Left eye: No discharge.   Cardiovascular:      Rate and Rhythm: Normal rate and regular rhythm.      Pulses: Normal pulses.      Heart sounds: Normal heart sounds.   Pulmonary:      Effort: Pulmonary effort is normal.      Breath sounds: Normal breath sounds.   Abdominal:      General: Bowel sounds are normal. There is no distension.      Palpations: Abdomen is soft.      Tenderness: There is abdominal tenderness. There is no guarding or rebound.   Musculoskeletal:         General: Tenderness present.      Cervical back: Normal range of motion and  neck supple.   Skin:     General: Skin is dry.      Capillary Refill: Capillary refill takes 2 to 3 seconds.   Neurological:      Mental Status: He is alert. Mental status is at baseline.      Motor: Weakness present.      Gait: Gait abnormal.   Psychiatric:         Mood and Affect: Mood normal.         Behavior: Behavior normal.       Fluids    Intake/Output Summary (Last 24 hours) at 5/5/2023 1512  Last data filed at 5/5/2023 0411  Gross per 24 hour   Intake --   Output 700 ml   Net -700 ml       Laboratory  Recent Labs     05/03/23 0523 05/04/23  1037 05/05/23  0336   WBC 8.4 11.1* 9.3   RBC 3.70* 3.79* 3.56*   HEMOGLOBIN 12.1* 12.2* 11.7*   HEMATOCRIT 34.6* 37.3* 35.2*   MCV 93.5 98.4* 98.9*   MCH 32.7 32.2 32.9   MCHC 35.0 32.7* 33.2*   RDW 45.0 48.1 47.8   PLATELETCT 168 220 188   MPV 9.8 9.4 9.8     Recent Labs     05/03/23 0523 05/04/23  0925 05/05/23  0336   SODIUM 139 143 143   POTASSIUM 3.2* 4.2 3.4*   CHLORIDE 96 100 101   CO2 29 30 29   GLUCOSE 112* 156* 129*   BUN 44* 45* 41*   CREATININE 1.70* 1.69* 1.50*   CALCIUM 9.4 9.9 9.6                     Imaging  CT-HEAD W/O   Final Result         1.  No acute intracranial abnormality is identified, there are nonspecific white matter changes, commonly associated with small vessel ischemic disease.  Associated mild cerebral atrophy is noted.   2.  Atherosclerosis.         DX-CHEST-LIMITED (1 VIEW)   Final Result         Stable chest x-ray findings.      EC-ECHOCARDIOGRAM COMPLETE W/O CONT   Final Result      CT-ABDOMEN-PELVIS W/O   Final Result         1.  Small layering bilateral pleural effusions, right greater than left.   2.  Hazy linear densities in bilateral lung bases suggesting atelectasis, component of infiltrate not excluded   3.  Cholelithiasis   4.  Bladder stones   5.  Atherosclerosis and atherosclerotic coronary artery disease      DX-PELVIS-1 OR 2 VIEWS   Final Result      Successful reduction of dislocated left hip arthroplasty.       DX-PELVIS-1 OR 2 VIEWS   Final Result      1.  There is a superior left hip arthroplasty dislocation.      DX-CHEST-PORTABLE (1 VIEW)   Final Result      1.  Left basilar opacity which may represent consolidation or contusion with probable small effusion versus pleural thickening.   2.  Moderate enlargement of the cardiomediastinal silhouette.      DX-FEMUR-2+ LEFT   Final Result      1.  No acute displaced fracture of the left femur.   2.  There is a left superior hip arthroplasty dislocation.             Assessment/Plan  * TAMMI (acute kidney injury) (HCC)- (present on admission)  Assessment & Plan  Now improving since we stopped metolazone     Advance care planning  Assessment & Plan  dicussed with patient daughter about patient wishes and as per daughter patient is DNR/DNI  More then 18 min spent discussing regarding goals of care     Delirium  Assessment & Plan  patient agitated and confused overnight   Checking a UA, ammonia, starting empirically on on IV ceftriaxone, , seen also by psychiatry case discussed with Dr. Flaherty and legal hold not valid as he is delirious   Also adding haldol IV 2m g prn as needed   CT head done and I reviewed it ,no acute issues.    Hip dislocation, left (HCC)  Assessment & Plan  Noted on XRAY     1.  No acute displaced fracture of the left femur.  2.  There is a left superior hip arthroplasty dislocation.    -Continue PT/OT  snf recommended.     Ground-level fall  Assessment & Plan  Patient brought to ED for trauma secondary to ground-level fall with left hip arthroplasty dislocation.  Patient reports mechanical fall with left knee which is also prosthetic giving out on him.  He had this happen once before approximately 4 to 5 years ago with reduction of the same hip.  -Patient underwent successful left hip reduction in ED.  Improved pain and movement following reduction.  -Fall precautions  -PT eval  -Recommend outpatient orthopedic follow-up    Monitoring.   Pt/ot recommending  snf.  -Patient pending transition to skilled nursing facility advance.  Patient was had a hypotension seemingly resolving.      Orthostatic dizziness  Assessment & Plan  Patient reports a recent history of orthostatic dizziness upon standing.  Was told by his PCP to discontinue his Benzapril, as well as instructed to stand and wait 20 to 30 seconds prior to walking.  -This fall not related to orthostatic, patient reports left knee giving out while walking  -Patient's BP improved after 1 L bolus in ED  -Patient currently on tamsulosin 0.8 for BPH  -PT eval, Recommend F/U with PCP, Consider stopping Tamsulosin.     Improved  continue monitoring  Stopped fluids.   Monitoring.  ekg my reading SR, no ischemic changes no st segment changes.     Edema- (present on admission)  Assessment & Plan  Lower ext  Patient had old echo as outpatient showed normal ef 55-60 on 2020  Will check bnp  cxr my reading increased cephalization, if bnp elevated will get echo.  Echo pending.     Paroxysmal atrial fibrillation (HCC)- (present on admission)  Assessment & Plan  On metoprolol   Not on any a/c.   Due to h/o fall patient probably is high risk for a/c.   EKG SR.  Medically managed on metoprolol    Benign essential hypertension- (present on admission)  Assessment & Plan  After aggressive diuresis patient's blood pressures in the 120s over 70s much improved.  Continue with Coreg 6.25 mg twice daily.  Continue to hold on benazepril at present.  I have discontinued IV diuretic and placed patient on steady dose of torsemide       Please note that this dictation was created using voice recognition software. I have made every reasonable attempt to correct obvious errors, but I expect that there are errors of grammar and possibly context that I did not discover before finalizing the note.    Greater than 51 minutes spent prepping to see patient (e.g. review of tests) obtaining and/or reviewing separately obtained history. Performing a  medically appropriate examination and/ evaluation.  Counseling and educating the patient/family/caregiver.  Ordering medications, tests, or procedures.  Referring and communicating with other health care professionals.  Documenting clinical information in EPIC.  Independently interpreting results and communicating results to patient/family/caregiver.  Care coordination.    VTE prophylaxis: heparin ppx    I have performed a physical exam and reviewed and updated ROS and Plan today (5/5/2023). In review of yesterday's note (5/4/2023), there are no changes except as documented above.

## 2023-05-05 NOTE — PROGRESS NOTES
PT has been agitated all night. Pt has been screaming out all night. Pt is only oriented time two. Pt has a tele sitter in place. However, has been trying to get out of bed. Pt is really restless and agitated. Pt has been verbally aggressive towards the staff trying to help.     2237- Pt was throwing items off his meal tray at staff. Pt threw multiple puddings at this RN and other staff trying to help. Pt was reoriented and this RN tried to deescalate the situation. This RN gave Ativan. Pt denied pain or needing to use the restroom.    0102- Pt has still been very agitated. Pt has been verbally aggressive towards all the staff that have been trying to help. Pt threw his juice across the room. Staff has tried to make pt as comfortable as possible. With almost every encounter pt is verbally aggressive. Pt is still trying to get out of bed to leave. This RN had to give Haldol. This RN just rounded on the pt every 5-10 min.    0213- This RN went to check on pt. Pt was agitated and still screaming. PT stated wanting to leave. This RN told pt he's in the hospital to get treated and reoriented pt. The pt then got mad and attempted to hit himself in the head with the call light. This RN took the call light away. The pt then took his glasses off the bedside table and attempted to put them in his mouth. This RN took the glasses from the pt. This RN decided it was safest to make the pt a legal hold due to intent to cause self harm to himself. The pt then talked about needing a gun to just shoot himself. This RN told pt he's in a safe place. This RN talked to the pt about not causing self harm to himself and reoriented pt. Pt is very agitated and screaming. This RN let charge nurse and LINDA Fierro know of the situation. Pt denies pain, needing to use bathroom, or wanting anything to eat or drink. Pt now has a sitter at bedside. This RN removed all potentially dangerous items from the pt's room.     0255- This RN wanted to  give LINDA Fierro an update regarding the pt. Pt's blood sugar was 132. Pt is only oriented times 1 now which is to himself. Pt is still super agitated and restless. This RN is just worried because per staff pt has been been like this the past few nights. Pt is still verbalizing thoughts of self harm.    0300- LINDA Fierro ordered labs and stat CT. Also, requesting vitals and to bladder scan the pt.     0312- This RN was letting LINDA Fierro know that pt is still very agitated and won't sit still enough for a CT.     0352- This RN called CT to see if we could come to CT. CT got a stat pt that had to be scanned first.     0411- Per LINDA Fierro can the Ativan be given.    0452- Ativan was given. Pt is still agitated. This RN expressed concerns that pt might not be able to sit still long enough for the scan to occur.     0541- LINDA Fierro ordered another 1 mg of Ativan.    0600- This RN called to see if we could come down for CT. Per CT not yet.    0640- Pt left for CT.    0657- PT is back in room from CT.

## 2023-05-05 NOTE — DIETARY
Nutrition Services Brief Update:    Day 10 of admit.  Taran Ornelas is a 82 y.o. male with admitting DX of TAMMI (acute kidney injury) (HCC) [N17.9]    Current Diet: Regular-no sharps     Problem: Nutritional:  Goal: Achieve adequate nutritional intake  Description: Patient will consume >50% of meals  Outcome: Not progressing     Per flowsheets, pt is eating 0-<25% of majority of meals. Per chart review, pt is experiencing agitation, aggression and confusion. This likely the cause of poor PO intake. Per previous RD note, PO intake has been poor since 4/28 (8 days). At this time, pt would benefit from a non-oral source of nutrition to help meet nutritional needs. Will place supplement order to provide an additional option for pt.     Recommendations:  If PO intake does not improve in 24-48 hrs (>50%), consider nutrition support to meet nutritional needs if within POC/pt wishes.   Provide protein supplements TID  Encourage intake >50% of meals and supplements   Document intake as % in ADLs   Monitor weight     RD following.

## 2023-05-05 NOTE — THERAPY
Physical Therapy Contact Note    Patient Name: Taran Ornelas  Age:  82 y.o., Sex:  male  Medical Record #: [id number]  Today's Date: 5/5/2023 05/05/23 1100   Interdisciplinary Plan of Care Collaboration   Collaboration Comments Notified RN, KI still not delivered to pt's room and established precautions. Discussed overnight events, and pt requesting to defer PT session. Discussed benefits of mobilization on restlessness/agitation; however, RN reported not appropriate. F/u later in the morning w/RN, and RN reported just giving Haldol, and not appropriate. Will continue to follow.

## 2023-05-05 NOTE — ASSESSMENT & PLAN NOTE
patient agitated and confused overnight   Checking a UA, ammonia, starting empirically on on IV ceftriaxone, , seen also by psychiatry case discussed with Dr. Flaherty and legal hold not valid as he is delirious   Also adding haldol IV 2m g prn as needed   CT head done and I reviewed it ,no acute issues.    Now on comfort care

## 2023-05-05 NOTE — PROGRESS NOTES
Patient with SI precautions, sitter at bedside. This RN gave report to sitter. All questions answered. Patient restless and confused in bed. Close monitoring continued

## 2023-05-05 NOTE — CONSULTS
[facility] PSYCHIATRIC CONSULT:Intake  Reason for admission: Pt experienced a MGLF today, L hip pain with inward rotation. +pulses and sensation, inability to move extremity  Consulting Physician/APN/PA: JIM Rosas  Reason for Consult: SI while agitated last night  Consultant: Stefania Flaherty MD    Legal Status on hold    CC: unintelligable  HPI: 81 yo male who is able to give his name, though didn't complete his last name and is largely unintelligible, makes grumbling noises and has to be redirected repeatedly to not get out of bed.    Chart reviewed. He is agitated at night, O x2 at best, restless. Verbally aggressive, throwing things. Took glasses off and tried to stick them in his mouth. Talked about a gun to shoot self. Later O x 1 only.     Chart(s) Review:  None on file    Medical ROS:  Review of systems per tx tm: pt is unable to participate    Psychiatric Exam (MSE):  Vitals:Blood pressure (!) 143/58, pulse 93, temperature 35.8 °C (96.5 °F), temperature source Temporal, resp. rate 18, height 1.829 m (6'), weight 89.8 kg (197 lb 15.6 oz), SpO2 92 %.    General Appearance: normal habitus, cannot focus attn, unintelligible and cannot participate meaningfully  Musculoskeletal: as noted above, restless, agitated  Alert/Orientation to self  Attn/Concentration: cannot focus  Fund of Knowledge:not tested  Memory recent/remote: unable to assess  Speech: garbaled  Language: unintelligable  Thought Content: no over psychosis . Unable to assess for    SI/HI       Thought Process: not coherent  Insight/Judgement:impaired  Mood:not identified  Affect: agitated, confused    Past Medical Hx:     Past Medical History:   Diagnosis Date    Atrial fibrillation (HCC) 4/19/2012    Benign essential hypertension 4/19/2012    CAD (coronary artery disease), native coronary artery 4/19/2012    Chronic anticoagulation 4/19/2012    GOUT 4/19/2012    PVC's (premature ventricular contractions) 4/19/2012          Past Psychiatric Hx: unable to assess       Family Psych Hx:  No family history on file.    Social Hx: unable to assess       Labs:  No results found for: AMPHUR, BARBSURINE, BENZODIAZU, COCAINEMET, METHADONE, ECSTASY, OPIATES, OXYCODN, PCPURINE, PROPOXY, CANNABINOID  Recent Labs     05/03/23 0523 05/04/23  1037 05/05/23  0336   WBC 8.4 11.1* 9.3   RBC 3.70* 3.79* 3.56*   HEMOGLOBIN 12.1* 12.2* 11.7*   HEMATOCRIT 34.6* 37.3* 35.2*   MCV 93.5 98.4* 98.9*   MCH 32.7 32.2 32.9   RDW 45.0 48.1 47.8   PLATELETCT 168 220 188   MPV 9.8 9.4 9.8     Recent Labs     05/03/23 0523 05/04/23  0925 05/05/23  0336   SODIUM 139 143 143   POTASSIUM 3.2* 4.2 3.4*   CHLORIDE 96 100 101   CO2 29 30 29   GLUCOSE 112* 156* 129*   BUN 44* 45* 41*       Cranial Imaging: personally reviewed  Cranial CT: age related changes     EKG: QTc:  436       Meds Current:  Scheduled Medications   Medication Dose Frequency    QUEtiapine  25 mg BID    cefTRIAXone (ROCEPHIN) IV  1,000 mg Q24HRS    carvedilol  12.5 mg BID WITH MEALS    dronabinol  2.5 mg BEFORE LUNCH AND DINNER    [Held by provider] torsemide  10 mg Q DAY    magnesium chloride  64 mg BID    ferrous sulfate-c-folic acid  1 Tablet DAILY    omeprazole  20 mg DAILY    sucralfate  1 g Q6HRS    lidocaine  2 Patch Q24HR    allopurinol  300 mg DAILY    senna-docusate  2 Tablet BID    heparin  5,000 Units Q8HRS    aspirin EC  81 mg DAILY    atorvastatin  80 mg Nightly    traZODone  50 mg Nightly    tamsulosin  0.8 mg Q EVENING      Latest Reference Range & Units Most Recent   TSH 0.380 - 5.330 uIU/mL 2.210  4/25/23 16:45     Allergies: Ibuprofen      Assessement    1. Encephalopathy, likely from age, mulitple etiologies to include pain, etc. Baseline is unknown. If he has some cognitive deficits, even if mild he may be more vulnerable to delirium as well. If it is unclear if he is encephalopathic, consider EEG .    Medical:   -TAMMI  -L hip dislocation  -ground level fall  -orthostatic   dizziness   -HTN  -on drombinol      Recommendations:  Legal Status: LH invalid; Psychiatry does not need to complete any paperwork because it is invalid.  Observation status: per tx tm     Discussed/voalted: ANANYA Kendrick    Medication and Other Recommendations: final orders as per Tx Tm  Can use routine low dose haldol 0.5 mg bid or tid and see if this is helpful for agitation  . Agree with seroquel. However:        -antipsychotics, all, are not reliable in managing delirium and associated behavior or psychosis.        -antipsychotic increase the risk of cardiovascular strokes in the elderly pop.  2    avoid anticholinergics  3    in as much as possible, physically mobilize the pt as often as possible. It has been shown to help shorten the period of delirium.  4.  Delineate night and day clearly, remind pt of situation and date, and avoid restraining if possible. He doesn't understand and agitation might get worse out of misunderstanding what is happening. Ensure sleep: on trazodone: may need increase to 100  or 150 mg       Thank you for the consult.   Signing off, please reconsult as needed.       Discharge recommendations: per tx tm     If released from [facility]: Discharge Instructions:  -Reviewed safety plan: 911, ER, PCM, MHC, Suicide crisis line  -Please assist with outpatient Psychiatric/substance use follow up appointments at discharge once medically cleared.

## 2023-05-06 NOTE — PROGRESS NOTES
Hospital Medicine Daily Progress Note    Date of Service  5/6/2023    Chief Complaint  Taran Ornelas is a 82 y.o. male admitted 4/24/2023 with ground level fall    Hospital Course  Mr. Taran Ornelas is a 82 y.o. male with a past medical history significant for CAD, MI, HTN, gout, left hip and knee replacement. who presented on 4/204/2023 after ground-level fall at home onto his left hip with inability to move his left hip.      Patient reports that he was walking in his house when his left knee gave out having him fall on his left hip.  Patient denies any syncopal episodes, or dizziness prior to falling.  Patient does report that he was having some orthostatic episodes but he would wait for them to pass prior to walking which has been going on for few weeks.  Patient reports that he has a history of multiple falls due to his left knee not holding his weight.  He reports that when he fell this time he landed on his left hip with inability to move the left leg or get up.  Denies any head trauma, or trauma to any other parts of the body.  Did not lose consciousness.    In ER, patient noted to have normal vital signs.  Notable lab findings include RBC at 3.10, hemoglobin 9.9, hematocrit 31.1, glucose 115, BUN 54, creatinine 2.82, GFR 21, calcium 8.3, INR 1.26, PT 15.6.  X-ray left femur notes no acute displaced fracture of the left femur but there is a left superior hip arthroplasty dislocation noted.  Chest x-ray notes left basilar opacity likely representative of consolidation or contusion with probable small effusion versus pleural thickening with moderate enlargement of the cardiomediastinal silhouette.  Patient admitted with hospital medicine for management of TAMMI, postacute evaluation with PT, and disposition planning.    Interval Problem Update  -Patient seen and examined.  Noted severe bruising on left leg.  Patient reports left hip pain.  Discussed with patient plan of care including continuation of hydration  due to TAMMI.  Continuation PT evaluation, more likely patient will go to a skilled facility for continued therapy  -Plan of care: Continue pain management for left hip pain; continuation IV fluids due to TAMMI; will likely need placement postacute for continued therapy  -Disposition: Patient switched to inpatient status as he is anticipated to stay 2-3 midnights for management of TAMMI, evaluation for placement for continued physical therapy due to left hip dislocation  -Lab work: Reviewed; expected  -VSS at this time        4/26 In bed, family at bedside, no fever or chills on 2L of o2, normally he does not wear any o2, denies chest pain or sob no palpitation, not in distress, cxr reviewed cephalization, I have ordered ekg, will also order echo, bnp, I have discussed with patient patient's family , all questions answered. Patient having falls as outpatient bp meds recently adjusted.   4/27 In bed, he was sleeping I woke him up, when I pressed on his abdomen he c/o pain, ct abdomen done last night reviewed no acute finding, cholelithiasis but no signs of inflammation, lipase is 111 will add antiacid meds, lft's ordered and reviewed they are wnl. Discussed with patient's family, all questions answered.  Addendum: patient having more sob and restless, I have ordered cxr and one time dose of iv lasix. Previous cxr showed cephalization and congestion.   Cxr today reviewed my reading increasing pulmonary congestion/edema, echo showed EF 55%, RVSP 70-75 likely pulmonary htn. Will transfer to telemetry. I have seen and examined pt, rales on lung exam, no wheezing, lower ext edema 1+. Decreased b/l air entry, will add IS. Lasix received will f/u urine output will get bladder scan to make sure he is voiding.   4/28/2023:  Patient does have significant component of pulmonary hypertension patient does have RVSP of approximately 70.  Patient does have elevated brain natruretic peptide approximate 27,000.  Have readministered patient  with Lasix today 80 mg one-time challenge dose we will place Lasix on starting 4/29/2020 2340 mg IV twice daily.  Continue supportive measures.  Patient will require placement.  Discussion was had with the patient and family at bedside patient's 2 daughters.  All questions were answered in detail.    4/29/2023:  Patient still intermittently attempted to get out of bed without assistance.  Applied Seroquel to patient today patient had improvement after Seroquel.  Furthermore patient continued to have good urinary output with diuresis.  Patient almost titrated off supplemental oxygen.  Discussed findings and details with patient to daughter at bedside.  Continue present medical management anticipate discharge 1 or 2 days.  4/30/2023:  Patient continued to have slow improvement has approximately 5.3 L of fluid out over the past 48 hours.  Continue present medical management patient is pending transition to skilled nursing facility on 5/1/2023.  Long discussion was had again with patient's family at bedside in detail with regards to the course of care.  Patient only requiring approximately 2 L of oxygen at present.  Continue to wean as tolerated.  5/2: Patient seen and examined, afebrile, resting in bed, cont on torsemide for diuresis and wean off O2 as tolerated  Needs placement and CM helping on arrangements   5/3: Patient resting in bed, BP not well controlled,  starting on amlodipine.  Creatinine 1.70 today so will hold metolazone  Hypokalemia: Potassium 3.2 today will replete and monitor   5/4: Patient seen and examined resting in bed, afebrile, BMP reviewed creatine 1.69 today cont to hold metolazone. Hypokalemia has improved.  Will need placement CM helping on arrangements   5/5: Patient seen and examined, he has become very agitated overnight and more confused   He was also placed on legal hold as he was trying to hurt himself    Checking a UA, ammonia, starting empirically on on IV ceftriaxone, , seen  also by psychiatry case discussed with Dr. Flaherty and legal hold not valid as he is delirious   Also adding haldol IV 2m g prn as needed   CT head done and I reviewed it ,no acute issues.  Family at bedside and I updated on patient condition and plan of care. Also discussed with patient regarding h advance directives and goals of care and patient has been changed to DNR/DNI   TAMMI slowly improving since his metolazone was stopped   5/6: Patient seen and examined, still confused, not much improvement on his mentation.  Cont on IV ceftriaxone for possible UTI,  seen also by speech and now NPO as is high risk of aspiration.  Daughter at bedside, discussed again about goals of care and if patient would like aggressive measures as per daughter patient would not wanted aggressive measure. Comfort care measures were also discussed with daughter and family will get together to decide regarding his goals of care   I have discussed this patient's plan of care and discharge plan at IDT rounds today with Case Management, Nursing, Nursing leadership, and other members of the IDT team.    Consultants/Specialty  NONE    Code Status  DNAR/DNI    Disposition  Patient is not medically cleared for discharge.   Anticipate discharge to to skilled nursing facility.  I have placed the appropriate orders for post-discharge needs.    Review of Systems  Review of Systems   Unable to perform ROS: Mental acuity   Endo/Heme/Allergies: Negative.       Physical Exam  Temp:  [36.3 °C (97.4 °F)-37 °C (98.6 °F)] 37 °C (98.6 °F)  Pulse:  [] 93  Resp:  [17-18] 17  BP: (127-146)/(55-74) 137/71  SpO2:  [93 %-96 %] 93 %    Physical Exam  Vitals and nursing note reviewed.   Constitutional:       Appearance: He is obese. He is ill-appearing.   HENT:      Head: Normocephalic.      Nose: Nose normal.   Eyes:      General: No scleral icterus.        Right eye: No discharge.         Left eye: No discharge.   Cardiovascular:      Rate and Rhythm: Normal  rate and regular rhythm.      Pulses: Normal pulses.      Heart sounds: Normal heart sounds.   Pulmonary:      Effort: Pulmonary effort is normal.      Breath sounds: Normal breath sounds.   Abdominal:      General: Bowel sounds are normal. There is no distension.      Palpations: Abdomen is soft.      Tenderness: There is abdominal tenderness. There is no guarding or rebound.   Musculoskeletal:         General: Tenderness present.      Cervical back: Normal range of motion and neck supple.   Skin:     General: Skin is dry.      Capillary Refill: Capillary refill takes 2 to 3 seconds.   Neurological:      Mental Status: He is alert. He is disoriented.      Motor: Weakness present.      Gait: Gait abnormal.   Psychiatric:         Mood and Affect: Mood normal.         Behavior: Behavior normal.       Fluids    Intake/Output Summary (Last 24 hours) at 5/6/2023 1403  Last data filed at 5/6/2023 0000  Gross per 24 hour   Intake --   Output 300 ml   Net -300 ml       Laboratory  Recent Labs     05/04/23  1037 05/05/23  0336 05/06/23  0622   WBC 11.1* 9.3 10.4   RBC 3.79* 3.56* 3.84*   HEMOGLOBIN 12.2* 11.7* 12.6*   HEMATOCRIT 37.3* 35.2* 37.2*   MCV 98.4* 98.9* 96.9   MCH 32.2 32.9 32.8   MCHC 32.7* 33.2* 33.9   RDW 48.1 47.8 46.2   PLATELETCT 220 188 196   MPV 9.4 9.8 9.6     Recent Labs     05/04/23  0925 05/05/23  0336 05/06/23  0622   SODIUM 143 143 146*   POTASSIUM 4.2 3.4* 3.6   CHLORIDE 100 101 107   CO2 30 29 23   GLUCOSE 156* 129* 143*   BUN 45* 41* 34*   CREATININE 1.69* 1.50* 1.19   CALCIUM 9.9 9.6 9.7                     Imaging  CT-HEAD W/O   Final Result         1.  No acute intracranial abnormality is identified, there are nonspecific white matter changes, commonly associated with small vessel ischemic disease.  Associated mild cerebral atrophy is noted.   2.  Atherosclerosis.         DX-CHEST-LIMITED (1 VIEW)   Final Result         Stable chest x-ray findings.      EC-ECHOCARDIOGRAM COMPLETE W/O CONT    Final Result      CT-ABDOMEN-PELVIS W/O   Final Result         1.  Small layering bilateral pleural effusions, right greater than left.   2.  Hazy linear densities in bilateral lung bases suggesting atelectasis, component of infiltrate not excluded   3.  Cholelithiasis   4.  Bladder stones   5.  Atherosclerosis and atherosclerotic coronary artery disease      DX-PELVIS-1 OR 2 VIEWS   Final Result      Successful reduction of dislocated left hip arthroplasty.      DX-PELVIS-1 OR 2 VIEWS   Final Result      1.  There is a superior left hip arthroplasty dislocation.      DX-CHEST-PORTABLE (1 VIEW)   Final Result      1.  Left basilar opacity which may represent consolidation or contusion with probable small effusion versus pleural thickening.   2.  Moderate enlargement of the cardiomediastinal silhouette.      DX-FEMUR-2+ LEFT   Final Result      1.  No acute displaced fracture of the left femur.   2.  There is a left superior hip arthroplasty dislocation.             Assessment/Plan  * TAMMI (acute kidney injury) (HCC)- (present on admission)  Assessment & Plan  Now improving since we stopped metolazone     Advance care planning  Assessment & Plan  dicussed with patient daughter about patient wishes and as per daughter patient is DNR/DNI  More then 18 min spent discussing regarding goals of care     5/6: discussed again about goals of care and if patient would like aggressive measures as per daughter patient would not wanted aggressive measure. Comfort care measures were also discussed with daughter and family will get together to decide regarding his goals of care more then 21 min spent      Delirium  Assessment & Plan  patient agitated and confused overnight   Checking a UA, ammonia, starting empirically on on IV ceftriaxone, , seen also by psychiatry case discussed with Dr. Flaherty and legal hold not valid as he is delirious   Also adding haldol IV 2m g prn as needed   CT head done and I reviewed it ,no acute  issues.    Hip dislocation, left (HCC)  Assessment & Plan  Noted on XRAY     1.  No acute displaced fracture of the left femur.  2.  There is a left superior hip arthroplasty dislocation.    -Continue PT/OT  snf recommended.     Ground-level fall  Assessment & Plan  Patient brought to ED for trauma secondary to ground-level fall with left hip arthroplasty dislocation.  Patient reports mechanical fall with left knee which is also prosthetic giving out on him.  He had this happen once before approximately 4 to 5 years ago with reduction of the same hip.  -Patient underwent successful left hip reduction in ED.  Improved pain and movement following reduction.  -Fall precautions  -PT eval  -Recommend outpatient orthopedic follow-up    Monitoring.   Pt/ot recommending snf.  -Patient [facility].  Patient was had a hypotension seemingly resolving.      Orthostatic dizziness  Assessment & Plan  Patient reports a recent history of orthostatic dizziness upon standing.  Was told by his PCP to discontinue his Benzapril, as well as instructed to stand and wait 20 to 30 seconds prior to walking.  -This fall not related to orthostatic, patient reports left knee giving out while walking  -Patient's BP improved after 1 L bolus in ED  -Patient currently on tamsulosin 0.8 for BPH  -PT eval, Recommend F/U with PCP, Consider stopping Tamsulosin.     Improved  continue monitoring  Stopped fluids.   Monitoring.  ekg my reading SR, no ischemic changes no st segment changes.     Edema- (present on admission)  Assessment & Plan  Lower ext  Patient had old echo as outpatient showed normal ef 55-60 on 2020  Will check bnp  cxr my reading increased cephalization, if bnp elevated will get echo.  Echo pending.     Paroxysmal atrial fibrillation (HCC)- (present on admission)  Assessment & Plan  On metoprolol   Not on any a/c.   Due to h/o fall patient probably is high risk for a/c.   EKG SR.  Medically  managed on metoprolol    Benign essential hypertension- (present on admission)  Assessment & Plan  After aggressive diuresis patient's blood pressures in the 120s over 70s much improved.  Continue with Coreg 6.25 mg twice daily.  Continue to hold on benazepril at present.  I have discontinued IV diuretic and placed patient on steady dose of torsemide       Please note that this dictation was created using voice recognition software. I have made every reasonable attempt to correct obvious errors, but I expect that there are errors of grammar and possibly context that I did not discover before finalizing the note.    Greater than 51 minutes spent prepping to see patient (e.g. review of tests) obtaining and/or reviewing separately obtained history. Performing a medically appropriate examination and/ evaluation.  Counseling and educating the patient/family/caregiver.  Ordering medications, tests, or procedures.  Referring and communicating with other health care professionals.  Documenting clinical information in EPIC.  Independently interpreting results and communicating results to patient/family/caregiver.  Care coordination.    VTE prophylaxis: heparin ppx    I have performed a physical exam and reviewed and updated ROS and Plan today (5/6/2023). In review of yesterday's note (5/5/2023), there are no changes except as documented above.

## 2023-05-07 NOTE — PROGRESS NOTES
Report called to SHRADDHA Kunz on [facility] [address]. Pt to transfer. This RN will call pt's daughters to inform them.

## 2023-05-07 NOTE — DISCHARGE PLANNING
Case Management Discharge Planning    Admission Date: 4/24/2023  GMLOS: 4.8  ALOS: 12    6-Clicks ADL Score: 18  6-Clicks Mobility Score: 15  PT and/or OT Eval ordered: Yes  Post-acute Referrals Ordered: Yes  Post-acute Choice Obtained: Yes  Has referral(s) been sent to post-acute provider:  Yes      Anticipated Discharge Dispo: Discharge Disposition: D/T to hospice home (50)  Discharge Contact Phone Number: [phone]    DME Needed: No    Action(s) Taken: Updated Provider/Nurse on Discharge Plan, Choice obtained, Referral(s) sent, Acceptance Received, and Transport Arranged     Escalations Completed: None    Medically Clear: Yes    Next Steps: Family met with [facility] and has been accepted onto their service with plan for DC home Monday at 1100 per [facility]. Ride Line/[facility] forms faxed to [facility] and [facility]; updated [facility] via Teams.    Barriers to Discharge: None    Is the patient up for discharge tomorrow: Yes    Is transport arranged for discharge disposition: Yes- in process

## 2023-05-07 NOTE — DISCHARGE PLANNING
Received Choice form at 1009  Agency/Facility Name: [facility]   Referral sent per Choice form @ [phone]  Agency/Facility Name: [facility]  Spoke To: Babs   Outcome: Per Babs Transportation is set for tomorrow 5/8 @ 1100.     RN CM Notified

## 2023-05-07 NOTE — CARE PLAN
Problem: Knowledge Deficit - Standard  Goal: Patient and family/care givers will demonstrate understanding of plan of care, disease process/condition, diagnostic tests and medications  Outcome: Progressing  Note: Discussed POC and medications with patient.  Patient verbalized understanding.     The patient is Stable - Low risk of patient condition declining or worsening    Shift Goals  Clinical Goals: safety, reorient, rest  Patient Goals: LEVAR  Family Goals: LEVAR

## 2023-05-07 NOTE — PROGRESS NOTES
Hospital Medicine Daily Progress Note    Date of Service  5/7/2023    Chief Complaint  Taran Ornelas is a 82 y.o. male admitted 4/24/2023 with ground level fall    Hospital Course  Mr. Taran Ornelas is a 82 y.o. male with a past medical history significant for CAD, MI, HTN, gout, left hip and knee replacement. who presented on 4/204/2023 after ground-level fall at home onto his left hip with inability to move his left hip.      Patient reports that he was walking in his house when his left knee gave out having him fall on his left hip.  Patient denies any syncopal episodes, or dizziness prior to falling.  Patient does report that he was having some orthostatic episodes but he would wait for them to pass prior to walking which has been going on for few weeks.  Patient reports that he has a history of multiple falls due to his left knee not holding his weight.  He reports that when he fell this time he landed on his left hip with inability to move the left leg or get up.  Denies any head trauma, or trauma to any other parts of the body.  Did not lose consciousness.    In ER, patient noted to have normal vital signs.  Notable lab findings include RBC at 3.10, hemoglobin 9.9, hematocrit 31.1, glucose 115, BUN 54, creatinine 2.82, GFR 21, calcium 8.3, INR 1.26, PT 15.6.  X-ray left femur notes no acute displaced fracture of the left femur but there is a left superior hip arthroplasty dislocation noted.  Chest x-ray notes left basilar opacity likely representative of consolidation or contusion with probable small effusion versus pleural thickening with moderate enlargement of the cardiomediastinal silhouette.  Patient admitted with hospital medicine for management of TAMMI, postacute evaluation with PT, and disposition planning.    Interval Problem Update  -Patient seen and examined.  Noted severe bruising on left leg.  Patient reports left hip pain.  Discussed with patient plan of care including continuation of hydration  due to TAMMI.  Continuation PT evaluation, more likely patient will go to a skilled facility for continued therapy  -Plan of care: Continue pain management for left hip pain; continuation IV fluids due to TAMMI; will likely need placement postacute for continued therapy  -Disposition: Patient switched to inpatient status as he is anticipated to stay 2-3 midnights for management of TAMMI, evaluation for placement for continued physical therapy due to left hip dislocation  -Lab work: Reviewed; expected  -VSS at this time        4/26 In bed, family at bedside, no fever or chills on 2L of o2, normally he does not wear any o2, denies chest pain or sob no palpitation, not in distress, cxr reviewed cephalization, I have ordered ekg, will also order echo, bnp, I have discussed with patient patient's family , all questions answered. Patient having falls as outpatient bp meds recently adjusted.   4/27 In bed, he was sleeping I woke him up, when I pressed on his abdomen he c/o pain, ct abdomen done last night reviewed no acute finding, cholelithiasis but no signs of inflammation, lipase is 111 will add antiacid meds, lft's ordered and reviewed they are wnl. Discussed with patient's family, all questions answered.  Addendum: patient having more sob and restless, I have ordered cxr and one time dose of iv lasix. Previous cxr showed cephalization and congestion.   Cxr today reviewed my reading increasing pulmonary congestion/edema, echo showed EF 55%, RVSP 70-75 likely pulmonary htn. Will transfer to telemetry. I have seen and examined pt, rales on lung exam, no wheezing, lower ext edema 1+. Decreased b/l air entry, will add IS. Lasix received will f/u urine output will get bladder scan to make sure he is voiding.   4/28/2023:  Patient does have significant component of pulmonary hypertension patient does have RVSP of approximately 70.  Patient does have elevated brain natruretic peptide approximate 27,000.  Have readministered patient  with Lasix today 80 mg one-time challenge dose we will place Lasix on starting 4/29/2020 2340 mg IV twice daily.  Continue supportive measures.  Patient will require placement.  Discussion was had with the patient and family at bedside patient's 2 daughters.  All questions were answered in detail.    4/29/2023:  Patient still intermittently attempted to get out of bed without assistance.  Applied Seroquel to patient today patient had improvement after Seroquel.  Furthermore patient continued to have good urinary output with diuresis.  Patient almost titrated off supplemental oxygen.  Discussed findings and details with patient to daughter at bedside.  Continue present medical management anticipate discharge 1 or 2 days.  4/30/2023:  Patient continued to have slow improvement has approximately 5.3 L of fluid out over the past 48 hours.  Continue present medical management patient is pending transition to skilled nursing facility on 5/1/2023.  Long discussion was had again with patient's family at bedside in detail with regards to the course of care.  Patient only requiring approximately 2 L of oxygen at present.  Continue to wean as tolerated.  5/2: Patient seen and examined, afebrile, resting in bed, cont on torsemide for diuresis and wean off O2 as tolerated  Needs placement and CM helping on arrangements   5/3: Patient resting in bed, BP not well controlled,  starting on amlodipine.  Creatinine 1.70 today so will hold metolazone  Hypokalemia: Potassium 3.2 today will replete and monitor   5/4: Patient seen and examined resting in bed, afebrile, BMP reviewed creatine 1.69 today cont to hold metolazone. Hypokalemia has improved.  Will need placement CM helping on arrangements   5/5: Patient seen and examined, he has become very agitated overnight and more confused   He was also placed on legal hold as he was trying to hurt himself    Checking a UA, ammonia, starting empirically on on IV ceftriaxone, , seen  also by psychiatry case discussed with Dr. Flaherty and legal hold not valid as he is delirious   Also adding haldol IV 2m g prn as needed   CT head done and I reviewed it ,no acute issues.  Family at bedside and I updated on patient condition and plan of care. Also discussed with patient regarding h advance directives and goals of care and patient has been changed to DNR/DNI   TAMMI slowly improving since his metolazone was stopped   5/6: Patient seen and examined, still confused, not much improvement on his mentation.  Cont on IV ceftriaxone for possible UTI,  seen also by speech and now NPO as is high risk of aspiration.  5/7: after discussion with family yesterday patient has transitioned to comfort care     Daughter at bedside, discussed again about goals of care and if patient would like aggressive measures as per daughter patient would not wanted aggressive measure. Comfort care measures were also discussed with daughter and family will get together to decide regarding his goals of care   I have discussed this patient's plan of care and discharge plan at IDT rounds today with Case Management, Nursing, Nursing leadership, and other members of the IDT team.    Consultants/Specialty  NONE    Code Status  Comfort Care/DNR    Disposition  Patient is not medically cleared for discharge.   Anticipate discharge to to skilled nursing facility.  I have placed the appropriate orders for post-discharge needs.    Review of Systems  Review of Systems   Unable to perform ROS: Mental acuity   Endo/Heme/Allergies: Negative.       Physical Exam  Temp:  [36.6 °C (97.9 °F)] 36.6 °C (97.9 °F)  Pulse:  [78-88] 78  Resp:  [14-19] 14  BP: ()/(57-71) 132/71  SpO2:  [88 %-97 %] 88 %    Physical Exam  Vitals and nursing note reviewed.   Constitutional:       Appearance: He is obese. He is ill-appearing.   HENT:      Head: Normocephalic.      Nose: Nose normal.   Eyes:      General: No scleral icterus.        Right eye: No discharge.          Left eye: No discharge.   Cardiovascular:      Rate and Rhythm: Normal rate and regular rhythm.      Pulses: Normal pulses.      Heart sounds: Normal heart sounds.   Pulmonary:      Effort: Pulmonary effort is normal.      Breath sounds: Normal breath sounds.   Abdominal:      General: Bowel sounds are normal. There is no distension.      Palpations: Abdomen is soft.      Tenderness: There is abdominal tenderness. There is no guarding or rebound.   Musculoskeletal:         General: Tenderness present.      Cervical back: Normal range of motion and neck supple.   Skin:     General: Skin is dry.      Capillary Refill: Capillary refill takes 2 to 3 seconds.   Neurological:      Mental Status: He is alert. He is disoriented.      Motor: Weakness present.      Gait: Gait abnormal.   Psychiatric:         Mood and Affect: Mood normal.         Behavior: Behavior normal.       Fluids    Intake/Output Summary (Last 24 hours) at 5/7/2023 1343  Last data filed at 5/7/2023 1200  Gross per 24 hour   Intake 0 ml   Output 0 ml   Net 0 ml       Laboratory  Recent Labs     05/05/23  0336 05/06/23  0622   WBC 9.3 10.4   RBC 3.56* 3.84*   HEMOGLOBIN 11.7* 12.6*   HEMATOCRIT 35.2* 37.2*   MCV 98.9* 96.9   MCH 32.9 32.8   MCHC 33.2* 33.9   RDW 47.8 46.2   PLATELETCT 188 196   MPV 9.8 9.6     Recent Labs     05/05/23  0336 05/06/23  0622   SODIUM 143 146*   POTASSIUM 3.4* 3.6   CHLORIDE 101 107   CO2 29 23   GLUCOSE 129* 143*   BUN 41* 34*   CREATININE 1.50* 1.19   CALCIUM 9.6 9.7                     Imaging  CT-HEAD W/O   Final Result         1.  No acute intracranial abnormality is identified, there are nonspecific white matter changes, commonly associated with small vessel ischemic disease.  Associated mild cerebral atrophy is noted.   2.  Atherosclerosis.         DX-CHEST-LIMITED (1 VIEW)   Final Result         Stable chest x-ray findings.      EC-ECHOCARDIOGRAM COMPLETE W/O CONT   Final Result      CT-ABDOMEN-PELVIS W/O    Final Result         1.  Small layering bilateral pleural effusions, right greater than left.   2.  Hazy linear densities in bilateral lung bases suggesting atelectasis, component of infiltrate not excluded   3.  Cholelithiasis   4.  Bladder stones   5.  Atherosclerosis and atherosclerotic coronary artery disease      DX-PELVIS-1 OR 2 VIEWS   Final Result      Successful reduction of dislocated left hip arthroplasty.      DX-PELVIS-1 OR 2 VIEWS   Final Result      1.  There is a superior left hip arthroplasty dislocation.      DX-CHEST-PORTABLE (1 VIEW)   Final Result      1.  Left basilar opacity which may represent consolidation or contusion with probable small effusion versus pleural thickening.   2.  Moderate enlargement of the cardiomediastinal silhouette.      DX-FEMUR-2+ LEFT   Final Result      1.  No acute displaced fracture of the left femur.   2.  There is a left superior hip arthroplasty dislocation.             Assessment/Plan  * TAMMI (acute kidney injury) (HCC)- (present on admission)  Assessment & Plan  Now improving since we stopped metolazone     Advance care planning  Assessment & Plan  dicussed with patient daughter about patient wishes and as per daughter patient is DNR/DNI  More then 18 min spent discussing regarding goals of care     5/6: discussed again about goals of care and if patient would like aggressive measures as per daughter patient would not wanted aggressive measure. Comfort care measures were also discussed with daughter and family will get together to decide regarding his goals of care more then 21 min spent      Delirium  Assessment & Plan  patient agitated and confused overnight   Checking a UA, ammonia, starting empirically on on IV ceftriaxone, , seen also by psychiatry case discussed with Dr. Flaherty and legal hold not valid as he is delirious   Also adding haldol IV 2m g prn as needed   CT head done and I reviewed it ,no acute issues.    Now on comfort care     Hip  dislocation, left (HCC)  Assessment & Plan  Noted on XRAY     1.  No acute displaced fracture of the left femur.  2.  There is a left superior hip arthroplasty dislocation.    -Continue PT/OT  snf recommended.     Ground-level fall  Assessment & Plan  Patient brought to ED for trauma secondary to ground-level fall with left hip arthroplasty dislocation.  Patient reports mechanical fall with left knee which is also prosthetic giving out on him.  He had this happen once before approximately 4 to 5 years ago with reduction of the same hip.  -Patient underwent successful left hip reduction in ED.  Improved pain and movement following reduction.  -Fall precautions  -PT eval  -Recommend outpatient orthopedic follow-up    Monitoring.   Pt/ot recommending snf.  -Patient pending transition to skilled nursing facility advance.  Patient was had a hypotension seemingly resolving.      Orthostatic dizziness  Assessment & Plan  Patient reports a recent history of orthostatic dizziness upon standing.  Was told by his PCP to discontinue his Benzapril, as well as instructed to stand and wait 20 to 30 seconds prior to walking.  -This fall not related to orthostatic, patient reports left knee giving out while walking  -Patient's BP improved after 1 L bolus in ED  -Patient currently on tamsulosin 0.8 for BPH  -PT eval, Recommend F/U with PCP, Consider stopping Tamsulosin.     Improved  continue monitoring  Stopped fluids.   Monitoring.  ekg my reading SR, no ischemic changes no st segment changes.     Now on comfort care     Edema- (present on admission)  Assessment & Plan  Lower ext  Patient had old echo as outpatient showed normal ef 55-60 on 2020  Will check bnp  cxr my reading increased cephalization, if bnp elevated will get echo.  Echo pending.     Comfort care     Paroxysmal atrial fibrillation (HCC)- (present on admission)  Assessment & Plan  On metoprolol   Not on any a/c.   Due to h/o fall patient probably is high risk for  a/c.   EKG SR.  Medically managed on metoprolol    Comfort care     Benign essential hypertension- (present on admission)  Assessment & Plan  After aggressive diuresis patient's blood pressures in the 120s over 70s much improved.  Continue with Coreg 6.25 mg twice daily.  Continue to hold on benazepril at present.  I have discontinued IV diuretic and placed patient on steady dose of torsemide       Please note that this dictation was created using voice recognition software. I have made every reasonable attempt to correct obvious errors, but I expect that there are errors of grammar and possibly context that I did not discover before finalizing the note.    Greater than 51 minutes spent prepping to see patient (e.g. review of tests) obtaining and/or reviewing separately obtained history. Performing a medically appropriate examination and/ evaluation.  Counseling and educating the patient/family/caregiver.  Ordering medications, tests, or procedures.  Referring and communicating with other health care professionals.  Documenting clinical information in EPIC.  Independently interpreting results and communicating results to patient/family/caregiver.  Care coordination.    VTE prophylaxis: heparin ppx    I have performed a physical exam and reviewed and updated ROS and Plan today (5/7/2023). In review of yesterday's note (5/6/2023), there are no changes except as documented above.

## 2023-05-07 NOTE — THERAPY
Speech Language Therapy Contact Note    Patient Name: Taran Ornelas  Age:  82 y.o., Sex:  male  Medical Record #: [id number]  Today's Date: 5/7/2023    Discussed missed therapy with SHRADDHA Kunz.       05/07/23 2928   Treatment Variance   Reason For Missed Therapy Medical - Patient on Hold from Therapy  (Patient is now Comfort Care per RN.)   Interdisciplinary Plan of Care Collaboration   IDT Collaboration with  Nursing   Collaboration Comments Patient is now Comfort Care only.  We will discontinue speech therapy.  If reassessment is required, please alert with new orders.  Thank you.   Session Information   Date / Session Number 5/5, 2 (1/3, 5/11) -5/7-Note only   Priority 0  (3x. NPO/NN, HIFU. change in mentation. TAMMI, frequent falls.)

## 2023-05-08 NOTE — CARE PLAN
Report received care assumed. Pt comfort care with comfort measures ongoing.  Fall precautions in place bed low and locked call light within reach, frequent checks ongoing    The patient is Stable - Low risk of patient condition declining or worsening    Shift Goals  Clinical Goals: comfort care  Patient Goals: comfort care  Family Goals: LEVAR    Progress made toward(s) clinical / shift goals:  comfort     Patient is not progressing towards the following goals:    Keep pt comfortable, pain-free.

## 2023-05-08 NOTE — PROGRESS NOTES
Received report from SHRADDHA Gallardo. Pt arrived on the floor. Pt remains obtunded, does not appear to need safety sitter, discontinued. Bed alarm in place. Will continue to monitor pain. Appears to have pain with turning, will hold 2 RN skin assessment d/t CC status.

## 2023-05-17 NOTE — DOCUMENTATION QUERY
"                                                                         [facility]                                                                       Query Response Note      PATIENT:               SHALOM EGAN  ACCT #:                  [id number]  MRN:                     [id number]  :                      1940  ADMIT DATE:       2023 3:09 PM  DISCH DATE:        2023 9:28 PM  RESPONDING  PROVIDER #:        [id number]           QUERY TEXT:    Encephalopathy is documented in the Medical Record. Please specify type.    NOTE:  If an appropriate response is not listed below, please respond with a new note.    Note: If you agree with a diagnosis listed above, please remember to include it in your concurrent daily documentation and onto the Discharge Summary.      The patient's Clinical Indicators include:  82 year old male admitted after a ground-level fall with left hip arthroplasty dislocation.       Ziu \"Respiratory failure  due to alter level of conscious  due to unknown etiology \"  \"Delirium\"  \" PAtietn mentation also has changed and has become more and more confused, he was not eating any more  and has failed speech eval.\"  \"on abx for possible UTI but mentation still ddi not improve. \"     Ziu  \"Patient seen and examined, he has become very agitated overnight and more confused\"  \" Patient seen and examined, still confused, not much improvement on his mentation.  Cont on IV ceftriaxone for possible UTI,  seen also by speech and now NPO as is high risk of aspiration.\"     Bunuel-Estephania \"Encephalopathy, likely from age, mulitple etiologies to include pain,\"      Risk factors: pHTN, possible UTI, TAMMI  Treatment: labs, CXR, IV Lasix, Tele, comfort care, ceftriaxone, pain medications    If you have any questions, please contact:  Mihaela Currie RN CDI [facility]  [email or web address]  Mihaela Currie [address] [employer]  Options provided:   -- Encephalopathy due to medications or drugs   -- " "Metabolic encephalopathy   -- Other explanation, please specify   -- Unable to determine      Query created by: Mihaela Currie on 5/15/2023 2:22 PM    RESPONSE TEXT:    Unable to determine       QUERY TEXT:    Respiratory Failure is documented in the Medical Record. Please specify the type and acuity (includes suspected or probable)    NOTE:  If an appropriate response is not listed below, please respond with a new note.    Note: If you agree with a diagnosis listed above, please remember to include it in your concurrent daily documentation and onto the Discharge Summary.        The patient's Clinical Indicators include:  82 year old male admitted after a ground-level fall with left hip arthroplasty dislocation.      5/7 Ziu \"Respiratory failure  due to alter level of conscious  due to unknown etiology \"    4/27  [place] \"patient having more sob and restless, I have ordered cxr and one time dose of iv lasix. Previous cxr showed cephalization and congestion. Cxr today reviewed my reading increasing pulmonary congestion/edema, echo showed EF 55%, RVSP 70-75 likely pulmonary htn.       4/27 ECHO \"Left ventricular systolic function is normal.The left ventricular ejection fraction is visually estimated to be 55%.  Diastolic function is difficult to assess with arrhythmia.\"    Risk factors: pHTN, acute pulmonary edema, afib, no home O2  Treatment: labs, CXR, IV Lasix, Tele, O2, comfort care    If you have any questions, please contact:  Mihaela Currie RN CDI [facility]  [email or web address]  Mihaela Currie [address] [employer]  Options provided:   -- Acute respiratory failure with hypoxia   -- Other explanation, please specify   -- Unable to determine      Query created by: Mihaela Currie on 5/15/2023 2:27 PM    RESPONSE TEXT:    Acute respiratory failure with hypoxia          Electronically signed by:  DAINA HEALY MD 5/17/2023 6:01 AM              "

## 2023-11-03 NOTE — HOSPITAL COURSE
Caller: Ember Miranda    Relationship: Self    Best call back number: [phone]    What orders are you requesting (i.e. lab or imaging):   REPEAT LABS    In what timeframe would the patient need to come in: 11.6.23 AM    Where will you receive your lab/imaging services: IN OFFICE    Additional notes:     PLEASE CALL TO CONFIRM           Mr. Taran Ornelas is a 82 y.o. male with a past medical history significant for CAD, MI, HTN, gout, left hip and knee replacement. who presented on 4/204/2023 after ground-level fall at home onto his left hip with inability to move his left hip.      Patient reports that he was walking in his house when his left knee gave out having him fall on his left hip.  Patient denies any syncopal episodes, or dizziness prior to falling.  Patient does report that he was having some orthostatic episodes but he would wait for them to pass prior to walking which has been going on for few weeks.  Patient reports that he has a history of multiple falls due to his left knee not holding his weight.  He reports that when he fell this time he landed on his left hip with inability to move the left leg or get up.  Denies any head trauma, or trauma to any other parts of the body.  Did not lose consciousness.    In ER, patient noted to have normal vital signs.  Notable lab findings include RBC at 3.10, hemoglobin 9.9, hematocrit 31.1, glucose 115, BUN 54, creatinine 2.82, GFR 21, calcium 8.3, INR 1.26, PT 15.6.  X-ray left femur notes no acute displaced fracture of the left femur but there is a left superior hip arthroplasty dislocation noted.  Chest x-ray notes left basilar opacity likely representative of consolidation or contusion with probable small effusion versus pleural thickening with moderate enlargement of the cardiomediastinal silhouette.  Patient admitted with hospital medicine for management of TAMMI, postacute evaluation with PT, and disposition planning.